# Patient Record
Sex: FEMALE | Race: WHITE | Employment: OTHER | URBAN - METROPOLITAN AREA
[De-identification: names, ages, dates, MRNs, and addresses within clinical notes are randomized per-mention and may not be internally consistent; named-entity substitution may affect disease eponyms.]

---

## 2019-04-17 ENCOUNTER — HOSPITAL ENCOUNTER (OUTPATIENT)
Age: 69
Discharge: HOME | End: 2019-04-19

## 2019-04-17 PROCEDURE — 88305 TISSUE EXAM BY PATHOLOGIST: CPT

## 2020-03-27 ENCOUNTER — HOSPITAL ENCOUNTER (EMERGENCY)
Age: 70
Discharge: HOME | End: 2020-03-27
Payer: MEDICARE

## 2020-03-27 VITALS
BODY MASS INDEX: 39.27 KG/M2 | SYSTOLIC BLOOD PRESSURE: 168 MMHG | OXYGEN SATURATION: 98 % | RESPIRATION RATE: 20 BRPM | DIASTOLIC BLOOD PRESSURE: 92 MMHG | HEART RATE: 80 BPM | WEIGHT: 200 LBS | HEIGHT: 60 IN | TEMPERATURE: 98.4 F

## 2020-03-27 PROCEDURE — 99283 EMERGENCY DEPT VISIT LOW MDM: CPT

## 2020-03-27 NOTE — ED PROVIDER NOTES
Normal.   ED Triage Vitals   BP Temp Temp Source Pulse Resp SpO2 Height Weight   03/27/20 1107 03/27/20 1042 03/27/20 1042 03/27/20 1042 03/27/20 1107 03/27/20 1042 -- --   (!) 168/92 98.4 °F (36.9 °C) Oral 80 20 98 %         Physical Exam  · Constitutional/General: Alert and oriented x3, well appearing, non toxic  · HEENT:  NC/NT. PERRLA,  Airway patent. · Neck: Supple, full ROM, non tender to palpation in the midline, no stridor, no crepitus, no meningeal signs  · Respiratory: Lungs clear to auscultation bilaterally, no wheezes, rales, or rhonchi. Not in respiratory distress  · CV:  Regular rate. Regular rhythm. No murmurs, gallops, or rubs. 2+ distal pulses  · Chest: No chest wall tenderness  · GI:  Abdomen Soft, Non tender, Non distended. +BS. No rebound, guarding, or rigidity. No pulsatile masses. · Musculoskeletal: Moves all extremities x 4. Warm and well perfused, no clubbing, cyanosis, or edema. Capillary refill <3 seconds  · Integument: skin warm and dry. No rashes. · Lymphatic: no lymphadenopathy noted  · Neurologic: GCS 15, no focal deficits, symmetric strength 5/5 in the upper and lower extremities bilaterally  · Psychiatric: Normal Affect    Lab / Imaging Results   (All laboratory and radiology results have been personally reviewed by myself)  Labs:  No results found for this visit on 03/27/20. Imaging: All Radiology results interpreted by Radiologist unless otherwise noted. No orders to display       ED Course / Medical Decision Making   Medications - No data to display         Consult(s):   None    Procedure(s):   none    MDM:   Patient has mildly elevated blood pressure but denies chest pain, shortness of breath, headaches or visual changes. No current complaints at this time. Patient has no abdominal pain, abdomen is soft and nontender to palpation, no rigidity or surgical abdomen. Vital signs are stable otherwise, will discharge home at this time.   Advised return to ER for any

## 2021-03-26 ENCOUNTER — HOSPITAL ENCOUNTER (OUTPATIENT)
Dept: WOUND CARE | Age: 71
Discharge: HOME | End: 2021-03-26
Payer: MEDICARE

## 2021-03-26 VITALS
RESPIRATION RATE: 20 BRPM | TEMPERATURE: 97.8 F | HEIGHT: 64 IN | HEART RATE: 80 BPM | WEIGHT: 240 LBS | BODY MASS INDEX: 40.97 KG/M2

## 2021-03-26 DIAGNOSIS — L97.922 NON-PRESSURE CHRONIC ULCER OF LOWER LEG, LEFT, WITH FAT LAYER EXPOSED (HCC): ICD-10-CM

## 2021-03-26 DIAGNOSIS — I82.4Z3 ACUTE DEEP VEIN THROMBOSIS (DVT) OF DISTAL END OF BOTH LOWER EXTREMITIES (HCC): Primary | ICD-10-CM

## 2021-03-26 DIAGNOSIS — L97.922 NON-PRESSURE CHRONIC ULCER OF LEFT LOWER LEG WITH FAT LAYER EXPOSED (HCC): ICD-10-CM

## 2021-03-26 DIAGNOSIS — I73.9 PVD (PERIPHERAL VASCULAR DISEASE) (HCC): ICD-10-CM

## 2021-03-26 PROBLEM — L97.812 NON-PRESSURE CHRONIC ULCER OF OTHER PART OF RIGHT LOWER LEG WITH FAT LAYER EXPOSED (HCC): Status: ACTIVE | Noted: 2021-03-26

## 2021-03-26 PROCEDURE — 11042 DBRDMT SUBQ TIS 1ST 20SQCM/<: CPT

## 2021-03-26 PROCEDURE — 99214 OFFICE O/P EST MOD 30 MIN: CPT

## 2021-03-26 PROCEDURE — 11045 DBRDMT SUBQ TISS EACH ADDL: CPT

## 2021-03-26 PROCEDURE — 87075 CULTR BACTERIA EXCEPT BLOOD: CPT

## 2021-03-26 PROCEDURE — 87186 SC STD MICRODIL/AGAR DIL: CPT

## 2021-03-26 PROCEDURE — 87077 CULTURE AEROBIC IDENTIFY: CPT

## 2021-03-26 PROCEDURE — 87070 CULTURE OTHR SPECIMN AEROBIC: CPT

## 2021-03-26 RX ORDER — ACETAMINOPHEN 500 MG
500 TABLET ORAL EVERY 6 HOURS PRN
COMMUNITY

## 2021-03-26 RX ORDER — LISINOPRIL AND HYDROCHLOROTHIAZIDE 25; 20 MG/1; MG/1
1 TABLET ORAL DAILY
COMMUNITY

## 2021-03-26 RX ORDER — FUROSEMIDE 40 MG/1
40 TABLET ORAL DAILY
COMMUNITY

## 2021-03-26 RX ORDER — POTASSIUM CHLORIDE 1.5 G/1.77G
20 POWDER, FOR SOLUTION ORAL DAILY
COMMUNITY

## 2021-03-26 RX ORDER — LIDOCAINE HYDROCHLORIDE 40 MG/ML
SOLUTION TOPICAL ONCE
Status: DISCONTINUED | OUTPATIENT
Start: 2021-03-26 | End: 2021-03-27 | Stop reason: HOSPADM

## 2021-03-26 ASSESSMENT — PAIN DESCRIPTION - ORIENTATION: ORIENTATION: RIGHT

## 2021-03-26 ASSESSMENT — PAIN DESCRIPTION - ONSET: ONSET: ON-GOING

## 2021-03-26 ASSESSMENT — PAIN DESCRIPTION - DESCRIPTORS: DESCRIPTORS: THROBBING

## 2021-03-26 ASSESSMENT — PAIN DESCRIPTION - LOCATION: LOCATION: LEG

## 2021-03-26 ASSESSMENT — PAIN - FUNCTIONAL ASSESSMENT: PAIN_FUNCTIONAL_ASSESSMENT: PREVENTS OR INTERFERES SOME ACTIVE ACTIVITIES AND ADLS

## 2021-03-26 NOTE — PROGRESS NOTES
Multilayer Compression Wrap   (Not Unna) Below the Knee    NAME:  Lisa Faith  YOB: 1950  MEDICAL RECORD NUMBER:  [id number]  DATE:  3/26/2021    Multilayer compression wrap: Removed old Multilayer wrap if indicated and wash leg with mild soap/water. Applied moisturizing agent to dry skin as needed. Applied primary and secondary dressing as ordered. Applied multilayered dressing below the knee to right lower leg. Applied multilayered dressing below the knee to left lower leg. Instructed patient/caregiver not to remove dressing and to keep it clean and dry. Instructed patient/caregiver on complications to report to provider, such as pain, numbness in toes, heavy drainage, and slippage of dressing. Instructed patient on purpose of compression dressing and on activity and exercise recommendations.       Electronically signed by Sergio Seay RN on 3/26/2021 at 10:24 AM

## 2021-03-26 NOTE — PROGRESS NOTES
[facility]  History and Physical/Consultation  Podiatry    Referring Physician : Renetta Leach DO  Lias Faith  MEDICAL RECORD NUMBER:  [id number]  AGE: 66 y.o. GENDER: female  : 1950  EPISODE DATE:  3/26/2021  Subjective:     Chief Complaint   Patient presents with    Wound Check     right and left leg         HISTORY of PRESENT ILLNESS HPI     Lisa Faith is a 66 y.o. female who presents today for wound/ulcer evaluation. History of Wound Context:  The patient has had a wound of b/l legs venous which was first noted approximately months ago. This has been treated unna boots. On their initial visit to the wound healing center, 3/26/20 ,  the patient has noted that the wound has not been improving. The patient has had similar previous wounds in the past.      Pt is not on abx at time of initial visit.       Wound/Ulcer Pain Timing/Severity: constant  Quality of pain: aching  Severity:  4 / 10   Modifying Factors: Pain worsens with walking  Associated Signs/Symptoms: edema, erythema, drainage and numbness    Ulcer Identification:  Ulcer Type: venous  Contributing Factors: edema, venous stasis and lymphedema    Diabetic/Pressure/Non Pressure Ulcers onl y:  Ulcer: Non-Pressure ulcer, fat layer exposed    If patient has diabetic lower extremity wounds  Nuenz Classification of diabetic lower extremity wounds:    Grade Description   []  0 No open wound   []  1 Superficial ulcer involving the full skin thickness   []  2 Deep ulcer involves ligament, tendon, joint capsule, or fascia  No bone involvement or abscess presence   []  3 Deep Ulcer with abcess formation and/or osteomyelitis   []  4 Localized gangrene   []  5 Extensive gangrene of the foot     Wound: N/A        PAST MEDICAL HISTORY      Diagnosis Date    Arthritis     Bleeding ulcer     Hypertension      Past Surgical History:   Procedure Laterality Date     SECTION      CHOLECYSTECTOMY, LAPAROSCOPIC  2016    ERCP  07/07/2016    HYSTERECTOMY      JOINT REPLACEMENT Left     left knee     History reviewed. No pertinent family history. Social History     Tobacco Use    Smoking status: Never Smoker    Smokeless tobacco: Never Used   Substance Use Topics    Alcohol use: No    Drug use: No     No Known Allergies  Current Outpatient Medications on File Prior to Encounter   Medication Sig Dispense Refill    furosemide (LASIX) 40 MG tablet Take 40 mg by mouth daily      potassium chloride (KLOR-CON) 20 MEQ packet Take 20 mEq by mouth daily      lisinopril-hydroCHLOROthiazide (PRINZIDE;ZESTORETIC) 20-25 MG per tablet Take 1 tablet by mouth daily      acetaminophen (TYLENOL) 500 MG tablet Take 500 mg by mouth every 6 hours as needed for Pain      amLODIPine (NORVASC) 10 MG tablet Take 1 tablet by mouth daily (Patient taking differently: Take 5 mg by mouth daily ) 30 tablet 3    HYDROcodone-acetaminophen (NORCO) 5-325 MG per tablet Take 1 tablet by mouth every 4 hours as needed (PAIN) 30 tablet 0    HYDROcodone-acetaminophen (NORCO) 5-325 MG per tablet Take 1 tablet by mouth every 6 hours as needed for Pain 34 tablet 0    metoprolol tartrate (LOPRESSOR) 50 MG tablet Take 1 tablet by mouth 2 times daily 60 tablet 3    pantoprazole (PROTONIX) 40 MG tablet Take 1 tablet by mouth every morning (before breakfast) 30 tablet 0    vitamin B-12 (CYANOCOBALAMIN) 100 MCG tablet Take 50 mcg by mouth daily      calcium carbonate (OSCAL) 500 MG TABS tablet Take 1,000 mg by mouth daily       No current facility-administered medications on file prior to encounter.         REVIEW OF SYSTEMS   ROS : All others Negative if blank [], Positive if [x]  General Vascular   [] Fevers [] Claudication   [] Chills [] Rest Pain   Skin Neurologic   [x] Tissue Loss [x] Lower extremity neuropathy     Objective:    Pulse 80   Temp 97.8 °F (36.6 °C) (Temporal)   Resp 20   Ht 5' 4\" (1.626 m)   Wt 240 lb (108.9 kg)   BMI 41.20 kg/m²   Wt Readings from Last 3 Encounters:   03/26/21 240 lb (108.9 kg)   03/27/20 200 lb (90.7 kg)   07/09/16 211 lb 8 oz (95.9 kg)       PHYSICAL EXAM   CONSTITUTIONAL:   Awake, alert, cooperative   PSYCHIATRIC :  Oriented to time, place and person      normal insight to disease process  EXTREMITIES:   R LE Open wounds are noted   Skin color is abnormal with ulcers   Edema is  noted   Sensation intact to light touch   Palpation of the foot does cause pain   4/5 strength DF/PF  L LE Open wounds are noted   Skin color is abnormal with ulcers   Edema is  noted   Sensation intact to light touch   Palpation of the foot does cause pain   4/5 strength DF/PF  R dorsalis pedis 1 L dorsalis pedis 1   R posterior tibial 1 L posterior tibial 1     Assessment:     Problem List Items Addressed This Visit     None      Visit Diagnoses     Acute deep vein thrombosis (DVT) of distal end of both lower extremities ([facility] [address]    -  Primary    Relevant Orders    US DUP LOWER EXTREMITY MAPPING BILAT VENOUS    PVD (peripheral vascular disease) ([facility] [address]        Relevant Orders    VL RODOLFO BILATERAL LIMITED 1-2 LEVELS          Pre Debridement Measurements:  Are located in the [place]  Documentation Flow Sheet  Post Debridement Measurements:  Wound/Ulcer Descriptions are Pre Debridement except measurements:     Incision 07/08/16 Abdomen Mid;Right;Upper; Outer (Active)   Number of days: 1721       Wound 03/26/21 Leg Right;Posterior #1 (Active)   Wound Image   03/26/21 0936   Wound Length (cm) 3.5 cm 03/26/21 0936   Wound Width (cm) 2.2 cm 03/26/21 0936   Wound Depth (cm) 0.3 cm 03/26/21 0936   Wound Surface Area (cm^2) 7.7 cm^2 03/26/21 0936   Wound Volume (cm^3) 2.31 cm^3 03/26/21 0936   Post-Procedure Length (cm) 3.5 cm 03/26/21 1020   Post-Procedure Width (cm) 2.2 cm 03/26/21 1020   Post-Procedure Depth (cm) 0.3 cm 03/26/21 1020   Post-Procedure Surface Area (cm^2) 7.7 cm^2 03/26/21 1020   Post-Procedure Volume (cm^3) 2.31 cm^3 03/26/21 1020   Wound Assessment Pink/red;Fibrin 03/26/21 0936   Drainage Amount Moderate 03/26/21 0936   Drainage Description Serosanguinous;Brown 03/26/21 0936   Odor None 03/26/21 0936   Ying-wound Assessment Dry/flaky 03/26/21 0936   Number of days: 0       Wound 03/26/21 Pretibial Left #2 (Active)   Wound Image   03/26/21 0936   Wound Length (cm) 8.5 cm 03/26/21 0936   Wound Width (cm) 12.7 cm 03/26/21 0936   Wound Depth (cm) 0.2 cm 03/26/21 0936   Wound Surface Area (cm^2) 107.95 cm^2 03/26/21 0936   Wound Volume (cm^3) 21.59 cm^3 03/26/21 0936   Post-Procedure Length (cm) 8.5 cm 03/26/21 1020   Post-Procedure Width (cm) 12.7 cm 03/26/21 1020   Post-Procedure Depth (cm) 0.2 cm 03/26/21 1020   Post-Procedure Surface Area (cm^2) 107.95 cm^2 03/26/21 1020   Post-Procedure Volume (cm^3) 21.59 cm^3 03/26/21 1020   Wound Assessment Pink/red;Fibrin 03/26/21 0936   Drainage Amount Large 03/26/21 0936   Drainage Description Serosanguinous; Yellow 03/26/21 0936   Odor None 03/26/21 0936   Ying-wound Assessment Fragile 03/26/21 0936   Number of days: 0       Wound 03/26/21 Pretibial Right #3 (Active)   Wound Image   03/26/21 0954   Wound Length (cm) 3.5 cm 03/26/21 0954   Wound Width (cm) 5.2 cm 03/26/21 0954   Wound Depth (cm) 0.1 cm 03/26/21 0954   Wound Surface Area (cm^2) 18.2 cm^2 03/26/21 0954   Wound Volume (cm^3) 1.82 cm^3 03/26/21 0954   Post-Procedure Length (cm) 3.5 cm 03/26/21 1020   Post-Procedure Width (cm) 5.2 cm 03/26/21 1020   Post-Procedure Depth (cm) 0.1 cm 03/26/21 1020   Post-Procedure Surface Area (cm^2) 18.2 cm^2 03/26/21 1020   Post-Procedure Volume (cm^3) 1.82 cm^3 03/26/21 1020   Wound Assessment Pink/red 03/26/21 0954   Drainage Amount Small 03/26/21 0954   Drainage Description Serosanguinous; Yellow 03/26/21 0954   Odor None 03/26/21 0954   Ying-wound Assessment Fragile 03/26/21 0954   Number of days: 0       Wound 03/26/21 Leg Right;Medial #4 (Active)   Wound Image   03/26/21 0954   Wound Length (cm) 1.2 cm 03/26/21 0954 Wound Width (cm) 2.3 cm 03/26/21 0954   Wound Depth (cm) 0.1 cm 03/26/21 0954   Wound Surface Area (cm^2) 2.76 cm^2 03/26/21 0954   Wound Volume (cm^3) 0.28 cm^3 03/26/21 0954   Post-Procedure Length (cm) 1.2 cm 03/26/21 1020   Post-Procedure Width (cm) 2.3 cm 03/26/21 1020   Post-Procedure Depth (cm) 0.1 cm 03/26/21 1020   Post-Procedure Surface Area (cm^2) 2.76 cm^2 03/26/21 1020   Post-Procedure Volume (cm^3) 0.28 cm^3 03/26/21 1020   Wound Assessment Pink/red 03/26/21 0954   Drainage Amount Small 03/26/21 0954   Drainage Description Serosanguinous; Yellow 03/26/21 0954   Odor None 03/26/21 0954   Ying-wound Assessment Fragile 03/26/21 0954   Number of days: 0          Procedure Note  Indications:  Based on my examination of this patient's wound(s)/ulcer(s) today, debridement is required to promote healing and evaluate the wound base. Performed by: Erik Malik DPM    Consent obtained:  Yes    Time out taken:  Yes    Pain Control: Anesthetic  Anesthetic: 4% Lidocaine Liquid Topical     Debridement:Excisional Debridement    Using #15 blade scalpel the wound(s)/ulcer(s) was/were sharply debrided down through and including the removal of subcutaneous tissue. Devitalized Tissue Debrided:  fibrin, biofilm, slough and necrotic/eschar to stimulate bleeding to promote healing, post debridement good bleeding base and wound edges noted    Wound/Ulcer #: 1 and 2    Percent of Wound/Ulcer Debrided: 100%    Total Surface Area Debrided:  35 sq cm     Estimated Blood Loss:  Minimal  Hemostasis Achieved:  by pressure    Procedural Pain:  3  / 10   Post Procedural Pain:  4 / 10     Response to treatment:  Well tolerated by patient. A culture was done.       Plan:     Pt is not a smoker   - Discussed relationship of smoking and negative affects on wound healing   - Emphasized importance of tobacco avoidace/cessation   - Script for nicotine patch given to patient   - Information regarding support groups for smoking cessation given    In my professional opinion and based off the information that is available at this time this patient has appropriate indication for HBO Therapy: Maybe    Treatment Note please see attached Discharge Instructions    Written patient dismissal instructions given to patient and signed by patient or POA.              Electronically signed by Trina Gilbert DPM on 3/26/2021 at 10:24 AM

## 2021-03-26 NOTE — PLAN OF CARE
Problem: Pain:  Goal: Pain level will decrease  Description: Pain level will decrease  Outcome: Ongoing  Goal: Control of acute pain  Description: Control of acute pain  Outcome: Ongoing  Goal: Control of chronic pain  Description: Control of chronic pain  Outcome: Ongoing     Problem: Wound:  Goal: Will show signs of wound healing; wound closure and no evidence of infection  Description: Will show signs of wound healing; wound closure and no evidence of infection  Outcome: Ongoing     Problem: Arterial:  Goal: Optimize blood flow for wound healing  Description: Optimize blood flow for wound healing  Outcome: Ongoing     Problem: Venous:  Goal: Signs of wound healing will improve  Description: Signs of wound healing will improve  Outcome: Ongoing

## 2021-03-29 LAB — ANAEROBIC CULTURE: NORMAL

## 2021-03-31 ENCOUNTER — HOSPITAL ENCOUNTER (OUTPATIENT)
Dept: ULTRASOUND IMAGING | Age: 71
Discharge: HOME | End: 2021-04-02
Payer: MEDICARE

## 2021-03-31 ENCOUNTER — HOSPITAL ENCOUNTER (OUTPATIENT)
Dept: INTERVENTIONAL RADIOLOGY/VASCULAR | Age: 71
Discharge: HOME | End: 2021-04-02
Payer: MEDICARE

## 2021-03-31 DIAGNOSIS — I73.9 PVD (PERIPHERAL VASCULAR DISEASE) (HCC): ICD-10-CM

## 2021-03-31 DIAGNOSIS — I82.4Z3 ACUTE DEEP VEIN THROMBOSIS (DVT) OF DISTAL END OF BOTH LOWER EXTREMITIES (HCC): ICD-10-CM

## 2021-03-31 LAB
ORGANISM: ABNORMAL
WOUND/ABSCESS: ABNORMAL

## 2021-03-31 PROCEDURE — 93922 UPR/L XTREMITY ART 2 LEVELS: CPT

## 2021-03-31 PROCEDURE — 93970 EXTREMITY STUDY: CPT | Performed by: RADIOLOGY

## 2021-03-31 PROCEDURE — 93970 EXTREMITY STUDY: CPT

## 2021-04-09 ENCOUNTER — HOSPITAL ENCOUNTER (OUTPATIENT)
Dept: WOUND CARE | Age: 71
Discharge: HOME | End: 2021-04-09
Payer: MEDICARE

## 2021-04-09 VITALS
RESPIRATION RATE: 21 BRPM | HEART RATE: 75 BPM | TEMPERATURE: 97.9 F | DIASTOLIC BLOOD PRESSURE: 74 MMHG | SYSTOLIC BLOOD PRESSURE: 140 MMHG

## 2021-04-09 DIAGNOSIS — I83.899 VENOUS STASIS ULCER WITH EDEMA OF LOWER LEG (HCC): ICD-10-CM

## 2021-04-09 DIAGNOSIS — I83.009 VENOUS STASIS ULCER WITH EDEMA OF LOWER LEG (HCC): ICD-10-CM

## 2021-04-09 DIAGNOSIS — R60.9 VENOUS STASIS ULCER WITH EDEMA OF LOWER LEG (HCC): ICD-10-CM

## 2021-04-09 DIAGNOSIS — L97.812 NON-PRESSURE CHRONIC ULCER OF OTHER PART OF RIGHT LOWER LEG WITH FAT LAYER EXPOSED (HCC): Primary | ICD-10-CM

## 2021-04-09 DIAGNOSIS — L97.922 NON-PRESSURE CHRONIC ULCER OF LOWER LEG, LEFT, WITH FAT LAYER EXPOSED (HCC): ICD-10-CM

## 2021-04-09 DIAGNOSIS — L97.909 VENOUS STASIS ULCER WITH EDEMA OF LOWER LEG (HCC): ICD-10-CM

## 2021-04-09 PROCEDURE — 11042 DBRDMT SUBQ TIS 1ST 20SQCM/<: CPT

## 2021-04-09 PROCEDURE — 11042 DBRDMT SUBQ TIS 1ST 20SQCM/<: CPT | Performed by: PODIATRIST

## 2021-04-09 PROCEDURE — 99203 OFFICE O/P NEW LOW 30 MIN: CPT | Performed by: PODIATRIST

## 2021-04-09 PROCEDURE — 11045 DBRDMT SUBQ TISS EACH ADDL: CPT | Performed by: PODIATRIST

## 2021-04-09 PROCEDURE — 11045 DBRDMT SUBQ TISS EACH ADDL: CPT

## 2021-04-09 RX ORDER — LEVOFLOXACIN 500 MG/1
500 TABLET, FILM COATED ORAL DAILY
COMMUNITY
End: 2021-04-16

## 2021-04-09 RX ORDER — LEVOFLOXACIN 500 MG/1
500 TABLET, FILM COATED ORAL DAILY
Qty: 10 TABLET | Refills: 0 | Status: SHIPPED | OUTPATIENT
Start: 2021-04-09 | End: 2021-04-19

## 2021-04-09 ASSESSMENT — PAIN - FUNCTIONAL ASSESSMENT: PAIN_FUNCTIONAL_ASSESSMENT: PREVENTS OR INTERFERES SOME ACTIVE ACTIVITIES AND ADLS

## 2021-04-09 ASSESSMENT — PAIN SCALES - GENERAL: PAINLEVEL_OUTOF10: 9

## 2021-04-09 ASSESSMENT — PAIN DESCRIPTION - ONSET: ONSET: ON-GOING

## 2021-04-09 ASSESSMENT — PAIN DESCRIPTION - FREQUENCY: FREQUENCY: INTERMITTENT

## 2021-04-09 ASSESSMENT — PAIN DESCRIPTION - LOCATION: LOCATION: LEG

## 2021-04-09 NOTE — PROGRESS NOTES
([facility]    Relevant Medications    levoFLOXacin (LEVAQUIN) 500 MG tablet          Pre Debridement Measurements:  Are located in the [place]  Documentation Flow Sheet  Post Debridement Measurements:  Wound/Ulcer Descriptions are Pre Debridement except measurements:     Incision 07/08/16 Abdomen Mid;Right;Upper; Outer (Active)   Number of days: 1735       Wound 03/26/21 Leg Right;Posterior #1 (Active)   Wound Image   03/26/21 0936   Dressing Status New dressing applied 03/26/21 1509   Wound Cleansed Cleansed with saline 03/26/21 1509   Dressing/Treatment Alginate with Ag;ABD;Dry dressing;Coban/self-adherent bandages 03/26/21 1509   Offloading for Diabetic Foot Ulcers No offloading required 03/26/21 1509   Wound Length (cm) 4.7 cm 04/09/21 1354   Wound Width (cm) 6.8 cm 04/09/21 1354   Wound Depth (cm) 0.2 cm 04/09/21 1354   Wound Surface Area (cm^2) 31.96 cm^2 04/09/21 1354   Change in Wound Size % (l*w) -315.06 04/09/21 1354   Wound Volume (cm^3) 6.39 cm^3 04/09/21 1354   Wound Healing % -177 04/09/21 1354   Post-Procedure Length (cm) 4.7 cm 04/09/21 1443   Post-Procedure Width (cm) 6.8 cm 04/09/21 1443   Post-Procedure Depth (cm) 0.3 cm 04/09/21 1443   Post-Procedure Surface Area (cm^2) 31.96 cm^2 04/09/21 1443   Post-Procedure Volume (cm^3) 9.59 cm^3 04/09/21 1443   Wound Assessment Pink/red;Fibrin;Pale granulation tissue 04/09/21 1354   Drainage Amount Moderate 04/09/21 1354   Drainage Description Serosanguinous 04/09/21 1354   Odor None 03/26/21 0936   Ying-wound Assessment Dry/flaky 04/09/21 1354   Number of days: 14       Wound 03/26/21 Pretibial Left #2 (Active)   Wound Image   03/26/21 0936   Wound Length (cm) 9.2 cm 04/09/21 1354   Wound Width (cm) 9.7 cm 04/09/21 1354   Wound Depth (cm) 0.1 cm 04/09/21 1354   Wound Surface Area (cm^2) 89.24 cm^2 04/09/21 1354   Change in Wound Size % (l*w) 17.33 04/09/21 1354   Wound Volume (cm^3) 8.92 cm^3 04/09/21 1354   Wound Healing % 59 04/09/21 1354   Post-Procedure Length (cm) 9.2 cm 04/09/21 1443   Post-Procedure Width (cm) 9.7 cm 04/09/21 1443   Post-Procedure Depth (cm) 0.2 cm 04/09/21 1443   Post-Procedure Surface Area (cm^2) 89.24 cm^2 04/09/21 1443   Post-Procedure Volume (cm^3) 17.85 cm^3 04/09/21 1443   Wound Assessment Pink/red;Fibrin;Pale granulation tissue 04/09/21 1354   Drainage Amount Large 04/09/21 1354   Drainage Description Serosanguinous; Yellow 04/09/21 1354   Odor None 04/09/21 1354   Ying-wound Assessment Fragile 04/09/21 1354   Number of days: 14       Wound 03/26/21 Pretibial Right #3 (Active)   Wound Image   03/26/21 0954   Wound Length (cm) 0.7 cm 04/09/21 1354   Wound Width (cm) 0.7 cm 04/09/21 1354   Wound Depth (cm) 0.1 cm 04/09/21 1354   Wound Surface Area (cm^2) 0.49 cm^2 04/09/21 1354   Change in Wound Size % (l*w) 97.31 04/09/21 1354   Wound Volume (cm^3) 0.05 cm^3 04/09/21 1354   Wound Healing % 97 04/09/21 1354   Post-Procedure Length (cm) 0.7 cm 04/09/21 1443   Post-Procedure Width (cm) 0.7 cm 04/09/21 1443   Post-Procedure Depth (cm) 0.1 cm 04/09/21 1443   Post-Procedure Surface Area (cm^2) 0.49 cm^2 04/09/21 1443   Post-Procedure Volume (cm^3) 0.05 cm^3 04/09/21 1443   Wound Assessment Pink/red;Pale granulation tissue 04/09/21 1354   Drainage Amount Small 04/09/21 1354   Drainage Description Serous 04/09/21 1354   Odor None 04/09/21 1354   Ying-wound Assessment Fragile 04/09/21 1354   Number of days: 14       Wound 03/26/21 Leg Right;Medial #4 (Active)   Wound Image   03/26/21 0954   Wound Length (cm) 1.4 cm 04/09/21 1354   Wound Width (cm) 2.5 cm 04/09/21 1354   Wound Depth (cm) 0.1 cm 04/09/21 1354   Wound Surface Area (cm^2) 3.5 cm^2 04/09/21 1354   Change in Wound Size % (l*w) -26.81 04/09/21 1354   Wound Volume (cm^3) 0.35 cm^3 04/09/21 1354   Wound Healing % -25 04/09/21 1354   Post-Procedure Length (cm) 1.2 cm 04/09/21 1443   Post-Procedure Width (cm) 2.3 cm 04/09/21 1443   Post-Procedure Depth (cm) 0.1 cm 04/09/21 1443 Post-Procedure Surface Area (cm^2) 2.76 cm^2 04/09/21 1443   Post-Procedure Volume (cm^3) 0.28 cm^3 04/09/21 1443   Wound Assessment Pink/red;Fibrin;Pale granulation tissue 04/09/21 1354   Drainage Amount Moderate 04/09/21 1354   Drainage Description Serous; Thin;Yellow 04/09/21 1354   Odor None 04/09/21 1354   Ying-wound Assessment Fragile 04/09/21 1354   Number of days: 14          Procedure Note  Indications:  Based on my examination of this patient's wound(s)/ulcer(s) today, debridement is required to promote healing and evaluate the wound base. Performed by: Ollie Butler DPM    Consent obtained:  Yes    Time out taken:  Yes    Pain Control: Anesthetic  Anesthetic: 4% Lidocaine Liquid Topical     Debridement:Excisional Debridement    Using curette the wound(s)/ulcer(s) was/were sharply debrided down through and including the removal of subcutaneous tissue. Devitalized Tissue Debrided:  fibrin, biofilm and slough to stimulate bleeding to promote healing, post debridement good bleeding base and wound edges noted    Wound/Ulcer #: 1 and 2    Percent of Wound/Ulcer Debrided: 100%    Total Surface Area Debrided:  121.20 sq cm     Estimated Blood Loss:  Minimal  Hemostasis Achieved:  by pressure    Procedural Pain:  3  / 10   Post Procedural Pain:  0 / 10     Response to treatment:  Well tolerated by patient. Prescription medication Levaquin given with exact instructions on usage. I discussed the potential side effects that the patient may experience with the medication and the need to call if they experience any. Plan:   Treatment Note please see attached Discharge Instructions    Written patient dismissal instructions given to patient and signed by patient or POA.          Discharge Instructions       Visit Discharge/Physician Orders     Discharge condition: Stable     Assessment of pain at discharge:  none     Anesthetic used: 4% lidocaine solutiion     Discharge to: Home     Left

## 2021-04-16 ENCOUNTER — HOSPITAL ENCOUNTER (OUTPATIENT)
Dept: WOUND CARE | Age: 71
Discharge: HOME | End: 2021-04-16
Payer: MEDICARE

## 2021-04-16 VITALS
TEMPERATURE: 96.8 F | DIASTOLIC BLOOD PRESSURE: 68 MMHG | SYSTOLIC BLOOD PRESSURE: 124 MMHG | RESPIRATION RATE: 20 BRPM | HEART RATE: 74 BPM

## 2021-04-16 DIAGNOSIS — L97.812 NON-PRESSURE CHRONIC ULCER OF OTHER PART OF RIGHT LOWER LEG WITH FAT LAYER EXPOSED (HCC): ICD-10-CM

## 2021-04-16 DIAGNOSIS — R60.9 VENOUS STASIS ULCER WITH EDEMA OF LOWER LEG (HCC): ICD-10-CM

## 2021-04-16 DIAGNOSIS — I83.899 VENOUS STASIS ULCER WITH EDEMA OF LOWER LEG (HCC): ICD-10-CM

## 2021-04-16 DIAGNOSIS — I83.009 VENOUS STASIS ULCER WITH EDEMA OF LOWER LEG (HCC): ICD-10-CM

## 2021-04-16 DIAGNOSIS — L97.909 VENOUS STASIS ULCER WITH EDEMA OF LOWER LEG (HCC): ICD-10-CM

## 2021-04-16 DIAGNOSIS — L97.922 NON-PRESSURE CHRONIC ULCER OF LOWER LEG, LEFT, WITH FAT LAYER EXPOSED (HCC): ICD-10-CM

## 2021-04-16 PROCEDURE — 11042 DBRDMT SUBQ TIS 1ST 20SQCM/<: CPT

## 2021-04-16 ASSESSMENT — PAIN DESCRIPTION - PAIN TYPE: TYPE: CHRONIC PAIN

## 2021-04-16 ASSESSMENT — PAIN - FUNCTIONAL ASSESSMENT: PAIN_FUNCTIONAL_ASSESSMENT: ACTIVITIES ARE NOT PREVENTED

## 2021-04-16 ASSESSMENT — PAIN DESCRIPTION - DESCRIPTORS: DESCRIPTORS: BURNING

## 2021-04-16 ASSESSMENT — PAIN DESCRIPTION - ORIENTATION: ORIENTATION: LEFT

## 2021-04-16 ASSESSMENT — PAIN SCALES - GENERAL: PAINLEVEL_OUTOF10: 5

## 2021-04-16 ASSESSMENT — PAIN DESCRIPTION - LOCATION: LOCATION: LEG

## 2021-04-16 ASSESSMENT — PAIN DESCRIPTION - FREQUENCY: FREQUENCY: INTERMITTENT

## 2021-04-16 NOTE — PLAN OF CARE
Problem: Pain:  Goal: Pain level will decrease  Description: Pain level will decrease  Outcome: Ongoing  Goal: Control of acute pain  Description: Control of acute pain  Outcome: Ongoing  Goal: Control of chronic pain  Description: Control of chronic pain  Outcome: Ongoing     Problem: Wound:  Goal: Will show signs of wound healing; wound closure and no evidence of infection  Description: Will show signs of wound healing; wound closure and no evidence of infection  Outcome: Ongoing     Problem: Arterial:  Goal: Optimize blood flow for wound healing  Description: Optimize blood flow for wound healing  Outcome: Ongoing     Problem: Venous:  Goal: Signs of wound healing will improve  Description: Signs of wound healing will improve  Outcome: Ongoing     Problem: Compression therapy:  Goal: Will be free from complications associated with compression therapy  Description: Will be free from complications associated with compression therapy  Outcome: Ongoing     Problem: Falls - Risk of:  Goal: Will remain free from falls  Description: Will remain free from falls  Outcome: Ongoing

## 2021-04-16 NOTE — PROGRESS NOTES
Wound Healing Center  History and Physical/Consultation  Podiatry    Referring Physician : Paul Hendricks DO  Chiquis Catalan  MEDICAL RECORD NUMBER:  [id number]  AGE: 66 y.o. GENDER: female  : 1950  EPISODE DATE:  2021  Subjective:     Chief Complaint   Patient presents with    Wound Check     wounds bilateral legs         HISTORY of PRESENT ILLNESS HANH Catalan is a 66 y.o. female who presents today for wound/ulcer evaluation. History of Wound Context:  The patient has had a wound of b/l legs venous which was first noted approximately months ago. This has been treated unna boots. On their initial visit to the wound healing center, 3/26/20 ,  the patient has noted that the wound has not been improving. The patient has had similar previous wounds in the past.      Pt is not on abx at time of initial visit.       Wound/Ulcer Pain Timing/Severity: constant  Quality of pain: aching  Severity:  4 / 10   Modifying Factors: Pain worsens with walking  Associated Signs/Symptoms: edema, erythema, drainage and numbness    Ulcer Identification:  Ulcer Type: venous  Contributing Factors: edema, venous stasis and lymphedema    Diabetic/Pressure/Non Pressure Ulcers onl y:  Ulcer: Non-Pressure ulcer, fat layer exposed    -  Debrided, cont tx and care, profore lyte with bhavana  If patient has diabetic lower extremity wounds  Nunez Classification of diabetic lower extremity wounds:    Grade Description   []  0 No open wound   []  1 Superficial ulcer involving the full skin thickness   []  2 Deep ulcer involves ligament, tendon, joint capsule, or fascia  No bone involvement or abscess presence   []  3 Deep Ulcer with abcess formation and/or osteomyelitis   []  4 Localized gangrene   []  5 Extensive gangrene of the foot     Wound: N/A        PAST MEDICAL HISTORY      Diagnosis Date    Arthritis     Bleeding ulcer     Hypertension      Past Surgical History:   Procedure Laterality Date     SECTION      CHOLECYSTECTOMY, LAPAROSCOPIC  2016    ERCP  2016    HYSTERECTOMY      JOINT REPLACEMENT Left     left knee     History reviewed. No pertinent family history. Social History     Tobacco Use    Smoking status: Never Smoker    Smokeless tobacco: Never Used   Substance Use Topics    Alcohol use: No    Drug use: No     No Known Allergies  Current Outpatient Medications on File Prior to Encounter   Medication Sig Dispense Refill    furosemide (LASIX) 40 MG tablet Take 40 mg by mouth daily      potassium chloride (KLOR-CON) 20 MEQ packet Take 20 mEq by mouth daily      lisinopril-hydroCHLOROthiazide (PRINZIDE;ZESTORETIC) 20-25 MG per tablet Take 1 tablet by mouth daily      metoprolol tartrate (LOPRESSOR) 50 MG tablet Take 1 tablet by mouth 2 times daily 60 tablet 3    amLODIPine (NORVASC) 10 MG tablet Take 1 tablet by mouth daily (Patient taking differently: Take 5 mg by mouth daily ) 30 tablet 3    pantoprazole (PROTONIX) 40 MG tablet Take 1 tablet by mouth every morning (before breakfast) 30 tablet 0    vitamin B-12 (CYANOCOBALAMIN) 100 MCG tablet Take 50 mcg by mouth daily      calcium carbonate (OSCAL) 500 MG TABS tablet Take 1,000 mg by mouth daily      levoFLOXacin (LEVAQUIN) 500 MG tablet Take 1 tablet by mouth daily for 10 days 10 tablet 0    acetaminophen (TYLENOL) 500 MG tablet Take 500 mg by mouth every 6 hours as needed for Pain       No current facility-administered medications on file prior to encounter.         REVIEW OF SYSTEMS   ROS : All others Negative if blank [], Positive if [x]  General Vascular   [] Fevers [] Claudication   [] Chills [] Rest Pain   Skin Neurologic   [x] Tissue Loss [x] Lower extremity neuropathy     Objective:    /68   Pulse 74   Temp 96.8 °F (36 °C) (Temporal)   Resp 20   Wt Readings from Last 3 Encounters:   21 240 lb (108.9 kg)   20 200 lb (90.7 kg)   16 211 lb 8 oz (95.9 kg)       PHYSICAL EXAM cm 04/16/21 1024   Post-Procedure Surface Area (cm^2) 15.6 cm^2 04/16/21 1024   Post-Procedure Volume (cm^3) 4.68 cm^3 04/16/21 1024   Wound Assessment Pink/red;Fibrin;Pale granulation tissue 04/09/21 1354   Drainage Amount Small 04/16/21 1013   Drainage Description Yellow;Serosanguinous 04/16/21 1013   Odor None 04/16/21 1013   Ying-wound Assessment Fragile 04/16/21 1013   Number of days: 21       Wound 03/26/21 Pretibial Left #2 (Active)   Wound Image   03/26/21 0936   Wound Length (cm) 9 cm 04/16/21 1013   Wound Width (cm) 9 cm 04/16/21 1013   Wound Depth (cm) 0.1 cm 04/16/21 1013   Wound Surface Area (cm^2) 81 cm^2 04/16/21 1013   Change in Wound Size % (l*w) 24.97 04/16/21 1013   Wound Volume (cm^3) 8.1 cm^3 04/16/21 1013   Wound Healing % 62 04/16/21 1013   Post-Procedure Length (cm) 9 cm 04/16/21 1024   Post-Procedure Width (cm) 9 cm 04/16/21 1024   Post-Procedure Depth (cm) 0.2 cm 04/16/21 1024   Post-Procedure Surface Area (cm^2) 81 cm^2 04/16/21 1024   Post-Procedure Volume (cm^3) 16.2 cm^3 04/16/21 1024   Wound Assessment Pink/red;Fibrin;Pale granulation tissue 04/09/21 1354   Drainage Amount Moderate 04/16/21 1013   Drainage Description Yellow 04/16/21 1013   Odor None 04/16/21 1013   Ying-wound Assessment Fragile 04/16/21 1013   Number of days: 21       Wound 03/26/21 Pretibial Right #3 (Active)   Wound Image   03/26/21 0954   Wound Length (cm) 0 cm 04/16/21 1013   Wound Width (cm) 0 cm 04/16/21 1013   Wound Depth (cm) 0 cm 04/16/21 1013   Wound Surface Area (cm^2) 0 cm^2 04/16/21 1013   Change in Wound Size % (l*w) 100 04/16/21 1013   Wound Volume (cm^3) 0 cm^3 04/16/21 1013   Wound Healing % 100 04/16/21 1013   Post-Procedure Length (cm) 0.7 cm 04/09/21 1443   Post-Procedure Width (cm) 0.7 cm 04/09/21 1443   Post-Procedure Depth (cm) 0.1 cm 04/09/21 1443   Post-Procedure Surface Area (cm^2) 0.49 cm^2 04/09/21 1443   Post-Procedure Volume (cm^3) 0.05 cm^3 04/09/21 1443   Wound Assessment Epithelialization 04/16/21 1013   Drainage Amount None 04/16/21 1013   Drainage Description Serous 04/09/21 1354   Odor None 04/16/21 1013   Ying-wound Assessment Intact 04/16/21 1013   Number of days: 21       Wound 03/26/21 Leg Right;Medial #4 (Active)   Wound Image   04/16/21 1013   Wound Length (cm) 1.4 cm 04/09/21 1354   Wound Width (cm) 2.5 cm 04/09/21 1354   Wound Depth (cm) 0.1 cm 04/09/21 1354   Wound Surface Area (cm^2) 3.5 cm^2 04/09/21 1354   Change in Wound Size % (l*w) -26.81 04/09/21 1354   Wound Volume (cm^3) 0.35 cm^3 04/09/21 1354   Wound Healing % -25 04/09/21 1354   Post-Procedure Length (cm) 0 cm 04/16/21 1013   Post-Procedure Width (cm) 0 cm 04/16/21 1013   Post-Procedure Depth (cm) 0 cm 04/16/21 1013   Post-Procedure Surface Area (cm^2) 0 cm^2 04/16/21 1013   Post-Procedure Volume (cm^3) 0 cm^3 04/16/21 1013   Wound Assessment Epithelialization 04/16/21 1013   Drainage Amount None 04/16/21 1013   Drainage Description Serous; Thin;Yellow 04/09/21 1354   Odor None 04/16/21 1013   Ying-wound Assessment Intact 04/16/21 1013   Number of days: 21          Procedure Note  Indications:  Based on my examination of this patient's wound(s)/ulcer(s) today, debridement is required to promote healing and evaluate the wound base. Performed by: Jesus Pierce DPM    Consent obtained:  Yes    Time out taken:  Yes    Pain Control: Anesthetic  Anesthetic: 4% Lidocaine Liquid Topical     Debridement:Excisional Debridement    Using #15 blade scalpel the wound(s)/ulcer(s) was/were sharply debrided down through and including the removal of subcutaneous tissue.         Devitalized Tissue Debrided:  fibrin, biofilm, slough and necrotic/eschar to stimulate bleeding to promote healing, post debridement good bleeding base and wound edges noted    Wound/Ulcer #: 1 and 2    Percent of Wound/Ulcer Debrided: 100%    Total Surface Area Debrided:  26 sq cm     Estimated Blood Loss:  Minimal  Hemostasis Achieved:  by pressure    Procedural Pain:  3  / 10   Post Procedural Pain:  4 / 10     Response to treatment:  Well tolerated by patient. A culture was done. Plan:     Pt is not a smoker   - Discussed relationship of smoking and negative affects on wound healing   - Emphasized importance of tobacco avoidace/cessation   - Script for nicotine patch given to patient   - Information regarding support groups for smoking cessation given    In my professional opinion and based off the information that is available at this time this patient has appropriate indication for HBO Therapy: Maybe    Treatment Note please see attached Discharge Instructions    Written patient dismissal instructions given to patient and signed by patient or POA. Discharge Instructions         Visit Discharge/Physician Orders     Discharge condition: Stable     Assessment of pain at discharge:  none     Anesthetic used: 4% lidocaine solutiion     Discharge to: Home     Left via:Private automobile     Accompanied by: accompanied by grand daughter     ECF/HHA: [facility]      Dressing Orders: Clean bilateral lower leg ulcers with normal saline. Apply promogran dry dressing and profore lite. Change weekly at wound care center     Treatment Orders: complete antibiotic as prescribed. [facility] followup visit __________1 DR LENA Monge__wednesday________________  (Please note your next appointment above and if you are unable to keep, kindly give a 24 hour notice.  Thank you.)     Physician signature:__________________________        If you experience any of the following, please call the [facility] during business hours:     * Increase in Pain  * Temperature over 101  * Increase in drainage from your wound  * Drainage with a foul odor  * Bleeding  * Increase in swelling  * Need for compression bandage changes due to slippage, breakthrough drainage.     If you need medical attention outside of the business hours of the [address] please contact your PCP or go to the nearest emergency room.                                Electronically signed by Brianda Mcclendon DPM on 4/16/2021 at 10:35 AM

## 2021-04-21 ENCOUNTER — HOSPITAL ENCOUNTER (OUTPATIENT)
Dept: WOUND CARE | Age: 71
Discharge: HOME | End: 2021-04-21
Payer: MEDICARE

## 2021-04-21 VITALS
SYSTOLIC BLOOD PRESSURE: 136 MMHG | RESPIRATION RATE: 21 BRPM | HEIGHT: 64 IN | HEART RATE: 91 BPM | DIASTOLIC BLOOD PRESSURE: 78 MMHG | BODY MASS INDEX: 40.97 KG/M2 | TEMPERATURE: 97.4 F | WEIGHT: 240 LBS

## 2021-04-21 DIAGNOSIS — R60.9 VENOUS STASIS ULCER WITH EDEMA OF LOWER LEG (HCC): Primary | ICD-10-CM

## 2021-04-21 DIAGNOSIS — I83.899 VENOUS STASIS ULCER WITH EDEMA OF LOWER LEG (HCC): Primary | ICD-10-CM

## 2021-04-21 DIAGNOSIS — L97.909 VENOUS STASIS ULCER WITH EDEMA OF LOWER LEG (HCC): Primary | ICD-10-CM

## 2021-04-21 DIAGNOSIS — I83.009 VENOUS STASIS ULCER WITH EDEMA OF LOWER LEG (HCC): Primary | ICD-10-CM

## 2021-04-21 DIAGNOSIS — L97.922 NON-PRESSURE CHRONIC ULCER OF LOWER LEG, LEFT, WITH FAT LAYER EXPOSED (HCC): ICD-10-CM

## 2021-04-21 DIAGNOSIS — L97.812 NON-PRESSURE CHRONIC ULCER OF OTHER PART OF RIGHT LOWER LEG WITH FAT LAYER EXPOSED (HCC): ICD-10-CM

## 2021-04-21 PROCEDURE — 11042 DBRDMT SUBQ TIS 1ST 20SQCM/<: CPT

## 2021-04-21 RX ORDER — LIDOCAINE 50 MG/G
OINTMENT TOPICAL ONCE
Status: CANCELLED | OUTPATIENT
Start: 2021-04-21 | End: 2021-04-21

## 2021-04-21 RX ORDER — BACITRACIN ZINC AND POLYMYXIN B SULFATE 500; 1000 [USP'U]/G; [USP'U]/G
OINTMENT TOPICAL ONCE
Status: CANCELLED | OUTPATIENT
Start: 2021-04-21 | End: 2021-04-21

## 2021-04-21 RX ORDER — LIDOCAINE HYDROCHLORIDE 20 MG/ML
JELLY TOPICAL ONCE
Status: CANCELLED | OUTPATIENT
Start: 2021-04-21 | End: 2021-04-21

## 2021-04-21 RX ORDER — GINSENG 100 MG
CAPSULE ORAL ONCE
Status: CANCELLED | OUTPATIENT
Start: 2021-04-21 | End: 2021-04-21

## 2021-04-21 RX ORDER — LIDOCAINE 40 MG/G
CREAM TOPICAL ONCE
Status: CANCELLED | OUTPATIENT
Start: 2021-04-21 | End: 2021-04-21

## 2021-04-21 RX ORDER — BACITRACIN, NEOMYCIN, POLYMYXIN B 400; 3.5; 5 [USP'U]/G; MG/G; [USP'U]/G
OINTMENT TOPICAL ONCE
Status: CANCELLED | OUTPATIENT
Start: 2021-04-21 | End: 2021-04-21

## 2021-04-21 RX ORDER — LIDOCAINE HYDROCHLORIDE 40 MG/ML
SOLUTION TOPICAL ONCE
Status: DISCONTINUED | OUTPATIENT
Start: 2021-04-21 | End: 2021-04-22 | Stop reason: HOSPADM

## 2021-04-21 RX ORDER — BETAMETHASONE DIPROPIONATE 0.05 %
OINTMENT (GRAM) TOPICAL ONCE
Status: CANCELLED | OUTPATIENT
Start: 2021-04-21 | End: 2021-04-21

## 2021-04-21 RX ORDER — LIDOCAINE HYDROCHLORIDE 40 MG/ML
SOLUTION TOPICAL ONCE
Status: CANCELLED | OUTPATIENT
Start: 2021-04-21 | End: 2021-04-21

## 2021-04-21 RX ORDER — CLOBETASOL PROPIONATE 0.5 MG/G
OINTMENT TOPICAL ONCE
Status: CANCELLED | OUTPATIENT
Start: 2021-04-21 | End: 2021-04-21

## 2021-04-21 RX ORDER — GENTAMICIN SULFATE 1 MG/G
OINTMENT TOPICAL ONCE
Status: CANCELLED | OUTPATIENT
Start: 2021-04-21 | End: 2021-04-21

## 2021-04-21 ASSESSMENT — PAIN DESCRIPTION - PAIN TYPE: TYPE: CHRONIC PAIN

## 2021-04-21 ASSESSMENT — PAIN DESCRIPTION - DESCRIPTORS: DESCRIPTORS: BURNING

## 2021-04-21 ASSESSMENT — PAIN DESCRIPTION - ORIENTATION: ORIENTATION: LEFT

## 2021-04-21 ASSESSMENT — PAIN DESCRIPTION - FREQUENCY: FREQUENCY: INTERMITTENT

## 2021-04-21 ASSESSMENT — PAIN SCALES - GENERAL: PAINLEVEL_OUTOF10: 6

## 2021-04-21 ASSESSMENT — PAIN DESCRIPTION - ONSET: ONSET: PROGRESSIVE

## 2021-04-21 NOTE — PLAN OF CARE
Problem: Wound:  Goal: Will show signs of wound healing; wound closure and no evidence of infection  Description: Will show signs of wound healing; wound closure and no evidence of infection  Outcome: Ongoing     Problem: Arterial:  Goal: Optimize blood flow for wound healing  Description: Optimize blood flow for wound healing  Outcome: Ongoing     Problem: Venous:  Goal: Signs of wound healing will improve  Description: Signs of wound healing will improve  Outcome: Ongoing     Problem: Compression therapy:  Goal: Will be free from complications associated with compression therapy  Description: Will be free from complications associated with compression therapy  Outcome: Ongoing     Problem: Falls - Risk of:  Goal: Will remain free from falls  Description: Will remain free from falls  Outcome: Ongoing     Problem: Pain:  Goal: Pain level will decrease  Description: Pain level will decrease  Outcome: Ongoing  Goal: Control of acute pain  Description: Control of acute pain  Outcome: Ongoing  Goal: Control of chronic pain  Description: Control of chronic pain  Outcome: Ongoing

## 2021-04-21 NOTE — PROGRESS NOTES
Wound Healing Center  History and Physical/Consultation  Podiatry    Referring Physician : Jose Wallis DO  Isael Covarrubias  MEDICAL RECORD NUMBER:  [id number]  AGE: 66 y.o. GENDER: female  : 1950  EPISODE DATE:  2021  Subjective:     Chief Complaint   Patient presents with    Wound Check     BLE         HISTORY of PRESENT ILLNESS HANH Covarrubias is a 66 y.o. female who presents today for wound/ulcer evaluation. History of Wound Context:  The patient has had a wound of b/l legs venous which was first noted approximately months ago. This has been treated unna boots. On their initial visit to the wound healing center, 3/26/20 ,  the patient has noted that the wound has not been improving. The patient has had similar previous wounds in the past.      Pt is not on abx at time of initial visit.       Wound/Ulcer Pain Timing/Severity: constant  Quality of pain: aching  Severity:  4 / 10   Modifying Factors: Pain worsens with walking  Associated Signs/Symptoms: edema, erythema, drainage and numbness    Ulcer Identification:  Ulcer Type: venous  Contributing Factors: edema, venous stasis and lymphedema    Diabetic/Pressure/Non Pressure Ulcers onl y:  Ulcer: Non-Pressure ulcer, fat layer exposed    4-  Debrided, cont tx and care, profore lyte with bhavana  If patient has diabetic lower extremity wounds    4-  Debrided, cont tx and care   Nunez Classification of diabetic lower extremity wounds:    Grade Description   []  0 No open wound   []  1 Superficial ulcer involving the full skin thickness   []  2 Deep ulcer involves ligament, tendon, joint capsule, or fascia  No bone involvement or abscess presence   []  3 Deep Ulcer with abcess formation and/or osteomyelitis   []  4 Localized gangrene   []  5 Extensive gangrene of the foot     Wound: N/A        PAST MEDICAL HISTORY      Diagnosis Date    Arthritis     Bleeding ulcer     Hypertension      Past Surgical History:   Procedure Laterality Date     SECTION      CHOLECYSTECTOMY, LAPAROSCOPIC  2016    ERCP  2016    HYSTERECTOMY      JOINT REPLACEMENT Left     left knee     History reviewed. No pertinent family history. Social History     Tobacco Use    Smoking status: Never Smoker    Smokeless tobacco: Never Used   Substance Use Topics    Alcohol use: No    Drug use: No     No Known Allergies  Current Outpatient Medications on File Prior to Encounter   Medication Sig Dispense Refill    furosemide (LASIX) 40 MG tablet Take 40 mg by mouth daily      potassium chloride (KLOR-CON) 20 MEQ packet Take 20 mEq by mouth daily      lisinopril-hydroCHLOROthiazide (PRINZIDE;ZESTORETIC) 20-25 MG per tablet Take 1 tablet by mouth daily      acetaminophen (TYLENOL) 500 MG tablet Take 500 mg by mouth every 6 hours as needed for Pain      metoprolol tartrate (LOPRESSOR) 50 MG tablet Take 1 tablet by mouth 2 times daily 60 tablet 3    amLODIPine (NORVASC) 10 MG tablet Take 1 tablet by mouth daily (Patient taking differently: Take 5 mg by mouth daily ) 30 tablet 3    pantoprazole (PROTONIX) 40 MG tablet Take 1 tablet by mouth every morning (before breakfast) 30 tablet 0    vitamin B-12 (CYANOCOBALAMIN) 100 MCG tablet Take 50 mcg by mouth daily      calcium carbonate (OSCAL) 500 MG TABS tablet Take 1,000 mg by mouth daily       No current facility-administered medications on file prior to encounter.         REVIEW OF SYSTEMS   ROS : All others Negative if blank [], Positive if [x]  General Vascular   [] Fevers [] Claudication   [] Chills [] Rest Pain   Skin Neurologic   [x] Tissue Loss [x] Lower extremity neuropathy     Objective:    /78   Pulse 91   Temp 97.4 °F (36.3 °C) (Temporal)   Resp 21   Ht 5' 4\" (1.626 m)   Wt 240 lb (108.9 kg)   BMI 41.20 kg/m²   Wt Readings from Last 3 Encounters:   21 240 lb (108.9 kg)   21 240 lb (108.9 kg)   20 200 lb (90.7 kg)       PHYSICAL EXAM   CONSTITUTIONAL: Awake, alert, cooperative   PSYCHIATRIC :  Oriented to time, place and person      normal insight to disease process  EXTREMITIES:   R LE Open wounds are noted   Skin color is abnormal with ulcers   Edema is  noted   Sensation intact to light touch   Palpation of the foot does cause pain   4/5 strength DF/PF  L LE Open wounds are noted   Skin color is abnormal with ulcers   Edema is  noted   Sensation intact to light touch   Palpation of the foot does cause pain   4/5 strength DF/PF  R dorsalis pedis 1 L dorsalis pedis 1   R posterior tibial 1 L posterior tibial 1     Assessment:     Problem List Items Addressed This Visit     Non-pressure chronic ulcer of other part of right lower leg with fat layer exposed ([address]    Relevant Medications    lidocaine (XYLOCAINE) 4 % external solution    Other Relevant Orders    Initiate Outpatient Wound Care Protocol    Non-pressure chronic ulcer of lower leg, left, with fat layer exposed ([address]    Relevant Medications    lidocaine (XYLOCAINE) 4 % external solution    Other Relevant Orders    Initiate Outpatient Wound Care Protocol    Venous stasis ulcer with edema of lower leg (HCC) - Primary    Relevant Medications    lidocaine (XYLOCAINE) 4 % external solution    Other Relevant Orders    Initiate Outpatient Wound Care Protocol          Pre Debridement Measurements:  Are located in the [place]  Documentation Flow Sheet  Post Debridement Measurements:  Wound/Ulcer Descriptions are Pre Debridement except measurements:     Incision 07/08/16 Abdomen Mid;Right;Upper; Outer (Active)   Number of days: 8824       Wound 03/26/21 Leg Right;Posterior #1 (Active)   Wound Image   04/21/21 0934   Dressing Status New dressing applied 04/16/21 1106   Wound Cleansed Cleansed with saline 04/16/21 1106   Dressing/Treatment ABD;Collagen 04/16/21 1106   Offloading for Diabetic Foot Ulcers No offloading required 04/16/21 1106   Wound Length (cm) 2.8 cm 04/21/21 0934   Wound Width (cm) 5 cm 04/21/21 0224   Wound Depth (cm) 0.1 cm 04/21/21 0934   Wound Surface Area (cm^2) 14 cm^2 04/21/21 0934   Change in Wound Size % (l*w) -81.82 04/21/21 0934   Wound Volume (cm^3) 1.4 cm^3 04/21/21 0934   Wound Healing % 39 04/21/21 0934   Post-Procedure Length (cm) 2.8 cm 04/21/21 1001   Post-Procedure Width (cm) 5 cm 04/21/21 1001   Post-Procedure Depth (cm) 0.2 cm 04/21/21 1001   Post-Procedure Surface Area (cm^2) 14 cm^2 04/21/21 1001   Post-Procedure Volume (cm^3) 2.8 cm^3 04/21/21 1001   Wound Assessment Pink/red;Pale granulation tissue 04/21/21 0934   Drainage Amount Small 04/21/21 0934   Drainage Description Yellow;Serosanguinous 04/21/21 0934   Odor None 04/21/21 0934   Ying-wound Assessment Fragile 04/21/21 0934   Number of days: 26       Wound 03/26/21 Pretibial Left #2 (Active)   Wound Image   04/21/21 0934   Dressing Status New dressing applied 04/16/21 1106   Wound Cleansed Cleansed with saline 04/16/21 1106   Dressing/Treatment ABD;Collagen 04/16/21 1106   Offloading for Diabetic Foot Ulcers No offloading required 04/16/21 1106   Wound Length (cm) 6.9 cm 04/21/21 0934   Wound Width (cm) 10.2 cm 04/21/21 0934   Wound Depth (cm) 0.1 cm 04/21/21 0934   Wound Surface Area (cm^2) 70.38 cm^2 04/21/21 0934   Change in Wound Size % (l*w) 34.8 04/21/21 0934   Wound Volume (cm^3) 7.04 cm^3 04/21/21 0934   Wound Healing % 67 04/21/21 0934   Post-Procedure Length (cm) 6.9 cm 04/21/21 1001   Post-Procedure Width (cm) 10.2 cm 04/21/21 1001   Post-Procedure Depth (cm) 0.2 cm 04/21/21 1001   Post-Procedure Surface Area (cm^2) 70.38 cm^2 04/21/21 1001   Post-Procedure Volume (cm^3) 14.08 cm^3 04/21/21 1001   Wound Assessment Pink/red;Pale granulation tissue;Slough 04/21/21 0934   Drainage Amount Moderate 04/21/21 0934   Drainage Description Yellow; Thick 04/21/21 0934   Odor None 04/21/21 0934   Ying-wound Assessment Fragile 04/21/21 0934   Number of days: 26          Procedure Note  Indications:  Based on my examination of this dressing and profore lite. Change weekly at wound care center      Treatment Orders: complete antibiotic as prescribed.        [facility] followup visit __________1 WEEK, DR PAREDES__wednesday________________  (Please note your next appointment above and if you are unable to keep, kindly give a 24 hour notice.  Thank you.)     Physician signature:__________________________        If you experience any of the following, please call the [facility] during business hours:     * Increase in Pain  * Temperature over 101  * Increase in drainage from your wound  * Drainage with a foul odor  * Bleeding  * Increase in swelling  * Need for compression bandage changes due to slippage, breakthrough drainage.     If you need medical attention outside of the business hours of the [facility] please contact your PCP or go to the nearest emergency room.                                                     Electronically signed by Victoria Hernández DPM on 4/21/2021 at 10:07 AM

## 2021-04-28 ENCOUNTER — HOSPITAL ENCOUNTER (OUTPATIENT)
Dept: WOUND CARE | Age: 71
Discharge: HOME | End: 2021-04-28
Payer: MEDICARE

## 2021-04-28 VITALS
RESPIRATION RATE: 18 BRPM | SYSTOLIC BLOOD PRESSURE: 128 MMHG | DIASTOLIC BLOOD PRESSURE: 76 MMHG | TEMPERATURE: 96.6 F | HEIGHT: 64 IN | WEIGHT: 240 LBS | HEART RATE: 76 BPM | BODY MASS INDEX: 40.97 KG/M2

## 2021-04-28 DIAGNOSIS — L97.812 NON-PRESSURE CHRONIC ULCER OF OTHER PART OF RIGHT LOWER LEG WITH FAT LAYER EXPOSED (HCC): ICD-10-CM

## 2021-04-28 DIAGNOSIS — I83.899 VENOUS STASIS ULCER WITH EDEMA OF LOWER LEG (HCC): Primary | ICD-10-CM

## 2021-04-28 DIAGNOSIS — I83.009 VENOUS STASIS ULCER WITH EDEMA OF LOWER LEG (HCC): Primary | ICD-10-CM

## 2021-04-28 DIAGNOSIS — R60.9 VENOUS STASIS ULCER WITH EDEMA OF LOWER LEG (HCC): Primary | ICD-10-CM

## 2021-04-28 DIAGNOSIS — L97.909 VENOUS STASIS ULCER WITH EDEMA OF LOWER LEG (HCC): Primary | ICD-10-CM

## 2021-04-28 DIAGNOSIS — L97.922 NON-PRESSURE CHRONIC ULCER OF LOWER LEG, LEFT, WITH FAT LAYER EXPOSED (HCC): ICD-10-CM

## 2021-04-28 PROCEDURE — 11042 DBRDMT SUBQ TIS 1ST 20SQCM/<: CPT

## 2021-04-28 PROCEDURE — 6370000000 HC RX 637 (ALT 250 FOR IP): Performed by: PODIATRIST

## 2021-04-28 RX ORDER — CLOBETASOL PROPIONATE 0.5 MG/G
OINTMENT TOPICAL ONCE
Status: CANCELLED | OUTPATIENT
Start: 2021-04-28 | End: 2021-04-28

## 2021-04-28 RX ORDER — LIDOCAINE 50 MG/G
OINTMENT TOPICAL ONCE
Status: CANCELLED | OUTPATIENT
Start: 2021-04-28 | End: 2021-04-28

## 2021-04-28 RX ORDER — BETAMETHASONE DIPROPIONATE 0.05 %
OINTMENT (GRAM) TOPICAL ONCE
Status: CANCELLED | OUTPATIENT
Start: 2021-04-28 | End: 2021-04-28

## 2021-04-28 RX ORDER — GINSENG 100 MG
CAPSULE ORAL ONCE
Status: CANCELLED | OUTPATIENT
Start: 2021-04-28 | End: 2021-04-28

## 2021-04-28 RX ORDER — LIDOCAINE HYDROCHLORIDE 40 MG/ML
SOLUTION TOPICAL ONCE
Status: CANCELLED | OUTPATIENT
Start: 2021-04-28 | End: 2021-04-28

## 2021-04-28 RX ORDER — LIDOCAINE HYDROCHLORIDE 40 MG/ML
SOLUTION TOPICAL ONCE
Status: COMPLETED | OUTPATIENT
Start: 2021-04-28 | End: 2021-04-28

## 2021-04-28 RX ORDER — BACITRACIN, NEOMYCIN, POLYMYXIN B 400; 3.5; 5 [USP'U]/G; MG/G; [USP'U]/G
OINTMENT TOPICAL ONCE
Status: CANCELLED | OUTPATIENT
Start: 2021-04-28 | End: 2021-04-28

## 2021-04-28 RX ORDER — GENTAMICIN SULFATE 1 MG/G
OINTMENT TOPICAL ONCE
Status: CANCELLED | OUTPATIENT
Start: 2021-04-28 | End: 2021-04-28

## 2021-04-28 RX ORDER — BACITRACIN ZINC AND POLYMYXIN B SULFATE 500; 1000 [USP'U]/G; [USP'U]/G
OINTMENT TOPICAL ONCE
Status: CANCELLED | OUTPATIENT
Start: 2021-04-28 | End: 2021-04-28

## 2021-04-28 RX ORDER — LIDOCAINE 40 MG/G
CREAM TOPICAL ONCE
Status: CANCELLED | OUTPATIENT
Start: 2021-04-28 | End: 2021-04-28

## 2021-04-28 RX ORDER — TRAMADOL HYDROCHLORIDE 50 MG/1
100 TABLET ORAL 2 TIMES DAILY PRN
COMMUNITY
End: 2021-07-09

## 2021-04-28 RX ORDER — LIDOCAINE HYDROCHLORIDE 20 MG/ML
JELLY TOPICAL ONCE
Status: CANCELLED | OUTPATIENT
Start: 2021-04-28 | End: 2021-04-28

## 2021-04-28 RX ADMIN — LIDOCAINE HYDROCHLORIDE: 40 SOLUTION TOPICAL at 09:25

## 2021-04-28 NOTE — PROGRESS NOTES
Hypertension      Past Surgical History:   Procedure Laterality Date     SECTION      CHOLECYSTECTOMY, LAPAROSCOPIC  2016    ERCP  2016    HYSTERECTOMY      JOINT REPLACEMENT Left     left knee     History reviewed. No pertinent family history. Social History     Tobacco Use    Smoking status: Never Smoker    Smokeless tobacco: Never Used   Substance Use Topics    Alcohol use: No    Drug use: No     No Known Allergies  Current Outpatient Medications on File Prior to Encounter   Medication Sig Dispense Refill    traMADol (ULTRAM) 50 MG tablet Take 50 mg by mouth 2 times daily as needed for Pain.  furosemide (LASIX) 40 MG tablet Take 40 mg by mouth daily      potassium chloride (KLOR-CON) 20 MEQ packet Take 20 mEq by mouth daily      lisinopril-hydroCHLOROthiazide (PRINZIDE;ZESTORETIC) 20-25 MG per tablet Take 1 tablet by mouth daily      acetaminophen (TYLENOL) 500 MG tablet Take 500 mg by mouth every 6 hours as needed for Pain      metoprolol tartrate (LOPRESSOR) 50 MG tablet Take 1 tablet by mouth 2 times daily 60 tablet 3    amLODIPine (NORVASC) 10 MG tablet Take 1 tablet by mouth daily (Patient taking differently: Take 5 mg by mouth daily ) 30 tablet 3    pantoprazole (PROTONIX) 40 MG tablet Take 1 tablet by mouth every morning (before breakfast) 30 tablet 0    vitamin B-12 (CYANOCOBALAMIN) 100 MCG tablet Take 50 mcg by mouth daily      calcium carbonate (OSCAL) 500 MG TABS tablet Take 1,000 mg by mouth daily       No current facility-administered medications on file prior to encounter.         REVIEW OF SYSTEMS   ROS : All others Negative if blank [], Positive if [x]  General Vascular   [] Fevers [] Claudication   [] Chills [] Rest Pain   Skin Neurologic   [x] Tissue Loss [x] Lower extremity neuropathy     Objective:    /76   Pulse 76   Temp 96.6 °F (35.9 °C) (Temporal)   Resp 18   Ht 5' 4\" (1.626 m)   Wt 240 lb (108.9 kg)   BMI 41.20 kg/m²   Wt Readings from Last 3 Encounters:   04/28/21 240 lb (108.9 kg)   04/21/21 240 lb (108.9 kg)   03/26/21 240 lb (108.9 kg)       PHYSICAL EXAM   CONSTITUTIONAL:   Awake, alert, cooperative   PSYCHIATRIC :  Oriented to time, place and person      normal insight to disease process  EXTREMITIES:   R LE Open wounds are noted   Skin color is abnormal with ulcers   Edema is  noted   Sensation intact to light touch   Palpation of the foot does cause pain   4/5 strength DF/PF  L LE Open wounds are noted   Skin color is abnormal with ulcers   Edema is  noted   Sensation intact to light touch   Palpation of the foot does cause pain   4/5 strength DF/PF  R dorsalis pedis 1 L dorsalis pedis 1   R posterior tibial 1 L posterior tibial 1     Assessment:     Problem List Items Addressed This Visit     Non-pressure chronic ulcer of other part of right lower leg with fat layer exposed ([address]    Relevant Orders    Initiate Outpatient Wound Care Protocol    Non-pressure chronic ulcer of lower leg, left, with fat layer exposed ([address]    Relevant Orders    Initiate Outpatient Wound Care Protocol    Venous stasis ulcer with edema of lower leg ([address] - Primary    Relevant Orders    Initiate Outpatient Wound Care Protocol          Pre Debridement Measurements:  Are located in the [place]  Documentation Flow Sheet  Post Debridement Measurements:  Wound/Ulcer Descriptions are Pre Debridement except measurements:     Incision 07/08/16 Abdomen Mid;Right;Upper; Outer (Active)   Number of days: 3850       Wound 03/26/21 Leg Right;Posterior #1 (Active)   Wound Image   04/21/21 0934   Dressing Status New dressing applied 04/21/21 1015   Wound Cleansed Cleansed with saline 04/21/21 1015   Dressing/Treatment ABD;Collagen 04/21/21 1015   Offloading for Diabetic Foot Ulcers No offloading required 04/21/21 1015   Wound Length (cm) 2.3 cm 04/28/21 0930   Wound Width (cm) 1 cm 04/28/21 0930   Wound Depth (cm) 0.1 cm 04/28/21 0930   Wound Surface Area (cm^2) 2.3 cm^2

## 2021-05-07 ENCOUNTER — HOSPITAL ENCOUNTER (OUTPATIENT)
Dept: WOUND CARE | Age: 71
Discharge: HOME | End: 2021-05-07
Payer: MEDICARE

## 2021-05-07 VITALS
DIASTOLIC BLOOD PRESSURE: 72 MMHG | HEART RATE: 92 BPM | SYSTOLIC BLOOD PRESSURE: 150 MMHG | TEMPERATURE: 97.4 F | RESPIRATION RATE: 18 BRPM

## 2021-05-07 DIAGNOSIS — L97.909 VENOUS STASIS ULCER WITH EDEMA OF LOWER LEG (HCC): Primary | ICD-10-CM

## 2021-05-07 DIAGNOSIS — R60.9 VENOUS STASIS ULCER WITH EDEMA OF LOWER LEG (HCC): Primary | ICD-10-CM

## 2021-05-07 DIAGNOSIS — I83.009 VENOUS STASIS ULCER WITH EDEMA OF LOWER LEG (HCC): Primary | ICD-10-CM

## 2021-05-07 DIAGNOSIS — L97.922 NON-PRESSURE CHRONIC ULCER OF LOWER LEG, LEFT, WITH FAT LAYER EXPOSED (HCC): ICD-10-CM

## 2021-05-07 DIAGNOSIS — I83.899 VENOUS STASIS ULCER WITH EDEMA OF LOWER LEG (HCC): Primary | ICD-10-CM

## 2021-05-07 DIAGNOSIS — L97.812 NON-PRESSURE CHRONIC ULCER OF OTHER PART OF RIGHT LOWER LEG WITH FAT LAYER EXPOSED (HCC): ICD-10-CM

## 2021-05-07 PROCEDURE — 6370000000 HC RX 637 (ALT 250 FOR IP): Performed by: PODIATRIST

## 2021-05-07 PROCEDURE — 11042 DBRDMT SUBQ TIS 1ST 20SQCM/<: CPT

## 2021-05-07 RX ORDER — GINSENG 100 MG
CAPSULE ORAL ONCE
Status: CANCELLED | OUTPATIENT
Start: 2021-05-07 | End: 2021-05-07

## 2021-05-07 RX ORDER — BETAMETHASONE DIPROPIONATE 0.05 %
OINTMENT (GRAM) TOPICAL ONCE
Status: CANCELLED | OUTPATIENT
Start: 2021-05-07 | End: 2021-05-07

## 2021-05-07 RX ORDER — LIDOCAINE HYDROCHLORIDE 20 MG/ML
JELLY TOPICAL ONCE
Status: CANCELLED | OUTPATIENT
Start: 2021-05-07 | End: 2021-05-07

## 2021-05-07 RX ORDER — BACITRACIN, NEOMYCIN, POLYMYXIN B 400; 3.5; 5 [USP'U]/G; MG/G; [USP'U]/G
OINTMENT TOPICAL ONCE
Status: CANCELLED | OUTPATIENT
Start: 2021-05-07 | End: 2021-05-07

## 2021-05-07 RX ORDER — LIDOCAINE HYDROCHLORIDE 40 MG/ML
SOLUTION TOPICAL ONCE
Status: COMPLETED | OUTPATIENT
Start: 2021-05-07 | End: 2021-05-07

## 2021-05-07 RX ORDER — GENTAMICIN SULFATE 1 MG/G
OINTMENT TOPICAL ONCE
Status: CANCELLED | OUTPATIENT
Start: 2021-05-07 | End: 2021-05-07

## 2021-05-07 RX ORDER — LIDOCAINE 40 MG/G
CREAM TOPICAL ONCE
Status: CANCELLED | OUTPATIENT
Start: 2021-05-07 | End: 2021-05-07

## 2021-05-07 RX ORDER — LIDOCAINE 50 MG/G
OINTMENT TOPICAL ONCE
Status: CANCELLED | OUTPATIENT
Start: 2021-05-07 | End: 2021-05-07

## 2021-05-07 RX ORDER — BACITRACIN ZINC AND POLYMYXIN B SULFATE 500; 1000 [USP'U]/G; [USP'U]/G
OINTMENT TOPICAL ONCE
Status: CANCELLED | OUTPATIENT
Start: 2021-05-07 | End: 2021-05-07

## 2021-05-07 RX ORDER — CLOBETASOL PROPIONATE 0.5 MG/G
OINTMENT TOPICAL ONCE
Status: CANCELLED | OUTPATIENT
Start: 2021-05-07 | End: 2021-05-07

## 2021-05-07 RX ORDER — LIDOCAINE HYDROCHLORIDE 40 MG/ML
SOLUTION TOPICAL ONCE
Status: CANCELLED | OUTPATIENT
Start: 2021-05-07 | End: 2021-05-07

## 2021-05-07 RX ADMIN — LIDOCAINE HYDROCHLORIDE: 40 SOLUTION TOPICAL at 08:50

## 2021-05-07 ASSESSMENT — PAIN - FUNCTIONAL ASSESSMENT: PAIN_FUNCTIONAL_ASSESSMENT: PREVENTS OR INTERFERES SOME ACTIVE ACTIVITIES AND ADLS

## 2021-05-07 ASSESSMENT — PAIN DESCRIPTION - LOCATION: LOCATION: FOOT

## 2021-05-07 ASSESSMENT — PAIN DESCRIPTION - PAIN TYPE: TYPE: ACUTE PAIN

## 2021-05-07 ASSESSMENT — PAIN DESCRIPTION - FREQUENCY: FREQUENCY: CONTINUOUS

## 2021-05-07 ASSESSMENT — PAIN SCALES - GENERAL: PAINLEVEL_OUTOF10: 10

## 2021-05-07 ASSESSMENT — PAIN DESCRIPTION - ONSET: ONSET: ON-GOING

## 2021-05-07 ASSESSMENT — PAIN DESCRIPTION - DESCRIPTORS: DESCRIPTORS: SHARP;THROBBING

## 2021-05-07 NOTE — PROGRESS NOTES
[facility]aling Center  History and Physical/Consultation  Podiatry    Referring Physician : Efe Maxwell DO  Alen Cantu  MEDICAL RECORD NUMBER:  [id number]  AGE: 66 y.o. GENDER: female  : 1950  EPISODE DATE:  2021  Subjective:     Chief Complaint   Patient presents with    Wound Check         HISTORY of PRESENT ILLNESS HPI     Alen Cantu is a 66 y.o. female who presents today for wound/ulcer evaluation. History of Wound Context:  The patient has had a wound of b/l legs venous which was first noted approximately months ago. This has been treated unna boots. On their initial visit to the wound healing center, 3/26/20 ,  the patient has noted that the wound has not been improving. The patient has had similar previous wounds in the past.      Pt is not on abx at time of initial visit.       Wound/Ulcer Pain Timing/Severity: constant  Quality of pain: aching  Severity:  4 / 10   Modifying Factors: Pain worsens with walking  Associated Signs/Symptoms: edema, erythema, drainage and numbness    Ulcer Identification:  Ulcer Type: venous  Contributing Factors: edema, venous stasis and lymphedema    Diabetic/Pressure/Non Pressure Ulcers onl y:  Ulcer: Non-Pressure ulcer, fat layer exposed    4-16-21  Debrided, cont tx and care, profore lyte with bhavana  If patient has diabetic lower extremity wounds    --21  Debrided, cont tx and care     --21  Debrided, healing well    --21  Debrided, cont tx and care   Nunez Classification of diabetic lower extremity wounds:    Grade Description   []  0 No open wound   []  1 Superficial ulcer involving the full skin thickness   []  2 Deep ulcer involves ligament, tendon, joint capsule, or fascia  No bone involvement or abscess presence   []  3 Deep Ulcer with abcess formation and/or osteomyelitis   []  4 Localized gangrene   []  5 Extensive gangrene of the foot     Wound: N/A        PAST MEDICAL HISTORY      Diagnosis Date    Arthritis     Bleeding ulcer     Hypertension      Past Surgical History:   Procedure Laterality Date     SECTION      CHOLECYSTECTOMY, LAPAROSCOPIC  2016    ERCP  2016    HYSTERECTOMY      JOINT REPLACEMENT Left     left knee     History reviewed. No pertinent family history. Social History     Tobacco Use    Smoking status: Never Smoker    Smokeless tobacco: Never Used   Substance Use Topics    Alcohol use: No    Drug use: No     No Known Allergies  Current Outpatient Medications on File Prior to Encounter   Medication Sig Dispense Refill    traMADol (ULTRAM) 50 MG tablet Take 50 mg by mouth 2 times daily as needed for Pain.  furosemide (LASIX) 40 MG tablet Take 40 mg by mouth daily      potassium chloride (KLOR-CON) 20 MEQ packet Take 20 mEq by mouth daily      lisinopril-hydroCHLOROthiazide (PRINZIDE;ZESTORETIC) 20-25 MG per tablet Take 1 tablet by mouth daily      metoprolol tartrate (LOPRESSOR) 50 MG tablet Take 1 tablet by mouth 2 times daily 60 tablet 3    amLODIPine (NORVASC) 10 MG tablet Take 1 tablet by mouth daily (Patient taking differently: Take 5 mg by mouth daily ) 30 tablet 3    pantoprazole (PROTONIX) 40 MG tablet Take 1 tablet by mouth every morning (before breakfast) 30 tablet 0    vitamin B-12 (CYANOCOBALAMIN) 100 MCG tablet Take 50 mcg by mouth daily      calcium carbonate (OSCAL) 500 MG TABS tablet Take 1,000 mg by mouth daily      acetaminophen (TYLENOL) 500 MG tablet Take 500 mg by mouth every 6 hours as needed for Pain       No current facility-administered medications on file prior to encounter.         REVIEW OF SYSTEMS   ROS : All others Negative if blank [], Positive if [x]  General Vascular   [] Fevers [] Claudication   [] Chills [] Rest Pain   Skin Neurologic   [x] Tissue Loss [x] Lower extremity neuropathy     Objective:    BP (!) 150/72   Pulse 92   Temp 97.4 °F (36.3 °C) (Temporal)   Resp 18   Wt Readings from Last 3 Encounters:   21 240 lb (108.9 kg) 04/21/21 240 lb (108.9 kg)   03/26/21 240 lb (108.9 kg)       PHYSICAL EXAM   CONSTITUTIONAL:   Awake, alert, cooperative   PSYCHIATRIC :  Oriented to time, place and person      normal insight to disease process  EXTREMITIES:   R LE Open wounds are noted   Skin color is abnormal with ulcers   Edema is  noted   Sensation intact to light touch   Palpation of the foot does cause pain   4/5 strength DF/PF  L LE Open wounds are noted   Skin color is abnormal with ulcers   Edema is  noted   Sensation intact to light touch   Palpation of the foot does cause pain   4/5 strength DF/PF  R dorsalis pedis 1 L dorsalis pedis 1   R posterior tibial 1 L posterior tibial 1     Assessment:     Problem List Items Addressed This Visit     Non-pressure chronic ulcer of other part of right lower leg with fat layer exposed ([address]    Relevant Orders    Initiate Outpatient Wound Care Protocol    Non-pressure chronic ulcer of lower leg, left, with fat layer exposed ([address]    Relevant Orders    Initiate Outpatient Wound Care Protocol    Venous stasis ulcer with edema of lower leg ([address] - Primary    Relevant Orders    Initiate Outpatient Wound Care Protocol          Pre Debridement Measurements:  Are located in the [facility]  Documentation Flow Sheet  Post Debridement Measurements:  Wound/Ulcer Descriptions are Pre Debridement except measurements:     Incision 07/08/16 Abdomen Mid;Right;Upper; Outer (Active)   Number of days: 6757       Wound 03/26/21 Leg Right;Posterior #1 (Active)   Wound Image   04/21/21 0934   Dressing Status New dressing applied 05/07/21 0936   Wound Cleansed Cleansed with saline 05/07/21 0936   Dressing/Treatment Hydrofera blue;Dry dressing 05/07/21 0936   Offloading for Diabetic Foot Ulcers No offloading required 04/28/21 1159   Wound Length (cm) 3.5 cm 05/07/21 0835   Wound Width (cm) 0.6 cm 05/07/21 0835   Wound Depth (cm) 0.1 cm 05/07/21 0835   Wound Surface Area (cm^2) 2.1 cm^2 05/07/21 0835   Change in Wound Size % (l*w) 72.73 05/07/21 0835   Wound Volume (cm^3) 0.21 cm^3 05/07/21 0835   Wound Healing % 91 05/07/21 0835   Post-Procedure Length (cm) 3.5 cm 05/07/21 0929   Post-Procedure Width (cm) 0.6 cm 05/07/21 0929   Post-Procedure Depth (cm) 0.2 cm 05/07/21 0929   Post-Procedure Surface Area (cm^2) 2.1 cm^2 05/07/21 0929   Post-Procedure Volume (cm^3) 0.42 cm^3 05/07/21 0929   Wound Assessment Pink/red;Slough 05/07/21 0835   Drainage Amount Small 05/07/21 0835   Drainage Description Yellow;Serosanguinous 05/07/21 0835   Odor None 05/07/21 0835   Ying-wound Assessment Intact 05/07/21 0835   Number of days: 42       Wound 03/26/21 Pretibial Left #2 (Active)   Wound Image   04/21/21 0934   Dressing Status New dressing applied 05/07/21 0936   Wound Cleansed Cleansed with saline 05/07/21 0936   Dressing/Treatment Hydrofera blue;Dry dressing 05/07/21 0936   Offloading for Diabetic Foot Ulcers No offloading required 04/28/21 1159   Wound Length (cm) 6.9 cm 05/07/21 0835   Wound Width (cm) 14.5 cm 05/07/21 0835   Wound Depth (cm) 0.2 cm 05/07/21 0835   Wound Surface Area (cm^2) 100.05 cm^2 05/07/21 0835   Change in Wound Size % (l*w) 7.32 05/07/21 0835   Wound Volume (cm^3) 20.01 cm^3 05/07/21 0835   Wound Healing % 7 05/07/21 0835   Post-Procedure Length (cm) 6.9 cm 05/07/21 0927   Post-Procedure Width (cm) 14.5 cm 05/07/21 0927   Post-Procedure Depth (cm) 0.2 cm 05/07/21 0927   Post-Procedure Surface Area (cm^2) 100.05 cm^2 05/07/21 0927   Post-Procedure Volume (cm^3) 20.01 cm^3 05/07/21 0927   Wound Assessment Pink/red;Pale granulation tissue;Slough 05/07/21 0835   Drainage Amount Moderate 05/07/21 0835   Drainage Description Yellow; Thick 05/07/21 0835   Odor None 05/07/21 0835   Ying-wound Assessment Blanchable erythema 05/07/21 0835   Number of days: 42          Procedure Note  Indications:  Based on my examination of this patient's wound(s)/ulcer(s) today, debridement is required to promote healing and evaluate the wound base.    Performed by: Victoria Hernández DPM    Consent obtained:  Yes    Time out taken:  Yes    Pain Control: Anesthetic  Anesthetic: 4% Lidocaine Liquid Topical     Debridement:Excisional Debridement    Using #15 blade scalpel the wound(s)/ulcer(s) was/were sharply debrided down through and including the removal of subcutaneous tissue. Devitalized Tissue Debrided:  fibrin, biofilm, slough and necrotic/eschar to stimulate bleeding to promote healing, post debridement good bleeding base and wound edges noted    Wound/Ulcer #: 1 and 2    Percent of Wound/Ulcer Debrided: 100%    Total Surface Area Debrided:  26 sq cm     Estimated Blood Loss:  Minimal  Hemostasis Achieved:  by pressure    Procedural Pain:  3  / 10   Post Procedural Pain:  4 / 10     Response to treatment:  Well tolerated by patient. A culture was done. Plan:     Pt is not a smoker   - Discussed relationship of smoking and negative affects on wound healing   - Emphasized importance of tobacco avoidace/cessation   - Script for nicotine patch given to patient   - Information regarding support groups for smoking cessation given    In my professional opinion and based off the information that is available at this time this patient has appropriate indication for HBO Therapy: Maybe    Treatment Note please see attached Discharge Instructions    Written patient dismissal instructions given to patient and signed by patient or POA. Discharge Instructions         Discharge Instructions        Visit Discharge/Physician Orders     Discharge condition: Stable     Assessment of pain at discharge:  none     Anesthetic used: 4% lidocaine solutiion     Discharge to: Home     Left via:Private automobile     Accompanied by: accompanied by grand daughter     [facility]F/H[facility]: [facility]      Dressing Orders: Clean bilateral lower leg ulcers with normal saline. Apply hydrofera blue dry dressing and unnaboot.  Change weekly at wound care center      Treatment Orders:         [facility] followup visit __________1 WEEK, DR PAREDES__friday_______________  (Please note your next appointment above and if you are unable to keep, kindly give a 24 hour notice. Thank you.)     Physician signature:__________________________        If you experience any of the following, please call the [facility] during business hours:     * Increase in Pain  * Temperature over 101  * Increase in drainage from your wound  * Drainage with a foul odor  * Bleeding  * Increase in swelling  * Need for compression bandage changes due to slippage, breakthrough drainage.     If you need medical attention outside of the business hours of the [facility] please contact your PCP or go to the nearest emergency room.              Electronically signed by Donzella Brittle, DPM on 5/7/2021 at 10:50 AM

## 2021-05-12 ENCOUNTER — HOSPITAL ENCOUNTER (OUTPATIENT)
Dept: WOUND CARE | Age: 71
Discharge: HOME | End: 2021-05-12
Payer: MEDICARE

## 2021-05-12 VITALS
HEIGHT: 64 IN | RESPIRATION RATE: 16 BRPM | DIASTOLIC BLOOD PRESSURE: 72 MMHG | TEMPERATURE: 97.1 F | WEIGHT: 240 LBS | HEART RATE: 80 BPM | BODY MASS INDEX: 40.97 KG/M2 | SYSTOLIC BLOOD PRESSURE: 140 MMHG

## 2021-05-12 DIAGNOSIS — I83.009 VENOUS STASIS ULCER WITH EDEMA OF LOWER LEG (HCC): Primary | ICD-10-CM

## 2021-05-12 DIAGNOSIS — I83.899 VENOUS STASIS ULCER WITH EDEMA OF LOWER LEG (HCC): Primary | ICD-10-CM

## 2021-05-12 DIAGNOSIS — L97.812 NON-PRESSURE CHRONIC ULCER OF OTHER PART OF RIGHT LOWER LEG WITH FAT LAYER EXPOSED (HCC): ICD-10-CM

## 2021-05-12 DIAGNOSIS — L97.922 NON-PRESSURE CHRONIC ULCER OF LOWER LEG, LEFT, WITH FAT LAYER EXPOSED (HCC): ICD-10-CM

## 2021-05-12 DIAGNOSIS — R60.9 VENOUS STASIS ULCER WITH EDEMA OF LOWER LEG (HCC): Primary | ICD-10-CM

## 2021-05-12 DIAGNOSIS — L97.909 VENOUS STASIS ULCER WITH EDEMA OF LOWER LEG (HCC): Primary | ICD-10-CM

## 2021-05-12 PROCEDURE — 11042 DBRDMT SUBQ TIS 1ST 20SQCM/<: CPT

## 2021-05-12 RX ORDER — LIDOCAINE HYDROCHLORIDE 20 MG/ML
JELLY TOPICAL ONCE
Status: CANCELLED | OUTPATIENT
Start: 2021-05-12 | End: 2021-05-12

## 2021-05-12 RX ORDER — BACITRACIN, NEOMYCIN, POLYMYXIN B 400; 3.5; 5 [USP'U]/G; MG/G; [USP'U]/G
OINTMENT TOPICAL ONCE
Status: CANCELLED | OUTPATIENT
Start: 2021-05-12 | End: 2021-05-12

## 2021-05-12 RX ORDER — LIDOCAINE HYDROCHLORIDE 40 MG/ML
SOLUTION TOPICAL ONCE
Status: CANCELLED | OUTPATIENT
Start: 2021-05-12 | End: 2021-05-12

## 2021-05-12 RX ORDER — LIDOCAINE 50 MG/G
OINTMENT TOPICAL ONCE
Status: CANCELLED | OUTPATIENT
Start: 2021-05-12 | End: 2021-05-12

## 2021-05-12 RX ORDER — BETAMETHASONE DIPROPIONATE 0.05 %
OINTMENT (GRAM) TOPICAL ONCE
Status: CANCELLED | OUTPATIENT
Start: 2021-05-12 | End: 2021-05-12

## 2021-05-12 RX ORDER — GENTAMICIN SULFATE 1 MG/G
OINTMENT TOPICAL ONCE
Status: CANCELLED | OUTPATIENT
Start: 2021-05-12 | End: 2021-05-12

## 2021-05-12 RX ORDER — CLOBETASOL PROPIONATE 0.5 MG/G
OINTMENT TOPICAL ONCE
Status: CANCELLED | OUTPATIENT
Start: 2021-05-12 | End: 2021-05-12

## 2021-05-12 RX ORDER — BACITRACIN ZINC AND POLYMYXIN B SULFATE 500; 1000 [USP'U]/G; [USP'U]/G
OINTMENT TOPICAL ONCE
Status: CANCELLED | OUTPATIENT
Start: 2021-05-12 | End: 2021-05-12

## 2021-05-12 RX ORDER — GINSENG 100 MG
CAPSULE ORAL ONCE
Status: CANCELLED | OUTPATIENT
Start: 2021-05-12 | End: 2021-05-12

## 2021-05-12 RX ORDER — LIDOCAINE HYDROCHLORIDE 40 MG/ML
SOLUTION TOPICAL ONCE
Status: DISCONTINUED | OUTPATIENT
Start: 2021-05-12 | End: 2021-05-13 | Stop reason: HOSPADM

## 2021-05-12 RX ORDER — LIDOCAINE 40 MG/G
CREAM TOPICAL ONCE
Status: CANCELLED | OUTPATIENT
Start: 2021-05-12 | End: 2021-05-12

## 2021-05-12 ASSESSMENT — PAIN DESCRIPTION - LOCATION: LOCATION: FOOT

## 2021-05-12 NOTE — PROGRESS NOTES
Wound Healing Center  History and Physical/Consultation  Podiatry    Referring Physician : Burt Morris DO  Halie Cole  MEDICAL RECORD NUMBER:  [id number]  AGE: 66 y.o. GENDER: female  : 1950  EPISODE DATE:  2021  Subjective:     Chief Complaint   Patient presents with    Wound Check     bilateral legs         HISTORY of PRESENT ILLNESS HPI     Halie Cole is a 66 y.o. female who presents today for wound/ulcer evaluation. History of Wound Context:  The patient has had a wound of b/l legs venous which was first noted approximately months ago. This has been treated unna boots. On their initial visit to the wound healing center, 3/26/20 ,  the patient has noted that the wound has not been improving. The patient has had similar previous wounds in the past.      Pt is not on abx at time of initial visit.       Wound/Ulcer Pain Timing/Severity: constant  Quality of pain: aching  Severity:  4 / 10   Modifying Factors: Pain worsens with walking  Associated Signs/Symptoms: edema, erythema, drainage and numbness    Ulcer Identification:  Ulcer Type: venous  Contributing Factors: edema, venous stasis and lymphedema    Diabetic/Pressure/Non Pressure Ulcers onl y:  Ulcer: Non-Pressure ulcer, fat layer exposed    4-16-21  Debrided, cont tx and care, profore lyte with bhavana  If patient has diabetic lower extremity wounds    --21  Debrided, cont tx and care     -21  Debrided, healing well    5--21  Debrided, cont tx and care     --21  Debrided, cont tx and care   Nunez Classification of diabetic lower extremity wounds:    Grade Description   []  0 No open wound   []  1 Superficial ulcer involving the full skin thickness   []  2 Deep ulcer involves ligament, tendon, joint capsule, or fascia  No bone involvement or abscess presence   []  3 Deep Ulcer with abcess formation and/or osteomyelitis   []  4 Localized gangrene   []  5 Extensive gangrene of the foot     Wound: N/A        PAST MEDICAL HISTORY      Diagnosis Date    Arthritis     Bleeding ulcer     Hypertension      Past Surgical History:   Procedure Laterality Date     SECTION      CHOLECYSTECTOMY, LAPAROSCOPIC  2016    ERCP  2016    HYSTERECTOMY      JOINT REPLACEMENT Left     left knee     History reviewed. No pertinent family history. Social History     Tobacco Use    Smoking status: Never Smoker    Smokeless tobacco: Never Used   Substance Use Topics    Alcohol use: No    Drug use: No     No Known Allergies  Current Outpatient Medications on File Prior to Encounter   Medication Sig Dispense Refill    traMADol (ULTRAM) 50 MG tablet Take 50 mg by mouth 2 times daily as needed for Pain.  furosemide (LASIX) 40 MG tablet Take 40 mg by mouth daily      potassium chloride (KLOR-CON) 20 MEQ packet Take 20 mEq by mouth daily      lisinopril-hydroCHLOROthiazide (PRINZIDE;ZESTORETIC) 20-25 MG per tablet Take 1 tablet by mouth daily      acetaminophen (TYLENOL) 500 MG tablet Take 500 mg by mouth every 6 hours as needed for Pain      metoprolol tartrate (LOPRESSOR) 50 MG tablet Take 1 tablet by mouth 2 times daily 60 tablet 3    amLODIPine (NORVASC) 10 MG tablet Take 1 tablet by mouth daily (Patient taking differently: Take 5 mg by mouth daily ) 30 tablet 3    pantoprazole (PROTONIX) 40 MG tablet Take 1 tablet by mouth every morning (before breakfast) 30 tablet 0    vitamin B-12 (CYANOCOBALAMIN) 100 MCG tablet Take 50 mcg by mouth daily      calcium carbonate (OSCAL) 500 MG TABS tablet Take 1,000 mg by mouth daily       No current facility-administered medications on file prior to encounter.         REVIEW OF SYSTEMS   ROS : All others Negative if blank [], Positive if [x]  General Vascular   [] Fevers [] Claudication   [] Chills [] Rest Pain   Skin Neurologic   [x] Tissue Loss [x] Lower extremity neuropathy     Objective:    BP (!) 140/72   Pulse 80   Temp 97.1 °F (36.2 °C) (Temporal)   Resp 16 Ht 5' 4\" (1.626 m)   Wt 240 lb (108.9 kg)   BMI 41.20 kg/m²   Wt Readings from Last 3 Encounters:   05/12/21 240 lb (108.9 kg)   04/28/21 240 lb (108.9 kg)   04/21/21 240 lb (108.9 kg)       PHYSICAL EXAM   CONSTITUTIONAL:   Awake, alert, cooperative   PSYCHIATRIC :  Oriented to time, place and person      normal insight to disease process  EXTREMITIES:   R LE Open wounds are noted   Skin color is abnormal with ulcers   Edema is  noted   Sensation intact to light touch   Palpation of the foot does cause pain   4/5 strength DF/PF  L LE Open wounds are noted   Skin color is abnormal with ulcers   Edema is  noted   Sensation intact to light touch   Palpation of the foot does cause pain   4/5 strength DF/PF  R dorsalis pedis 1 L dorsalis pedis 1   R posterior tibial 1 L posterior tibial 1     Assessment:     Problem List Items Addressed This Visit     Non-pressure chronic ulcer of other part of right lower leg with fat layer exposed ([address]    Relevant Medications    lidocaine (XYLOCAINE) 4 % external solution (Start on 5/12/2021 11:00 AM)    Other Relevant Orders    Initiate Outpatient Wound Care Protocol    Non-pressure chronic ulcer of lower leg, left, with fat layer exposed ([address]    Relevant Medications    lidocaine (XYLOCAINE) 4 % external solution (Start on 5/12/2021 11:00 AM)    Other Relevant Orders    Initiate Outpatient Wound Care Protocol    Venous stasis ulcer with edema of lower leg ([facility]) - Primary    Relevant Medications    lidocaine (XYLOCAINE) 4 % external solution (Start on 5/12/2021 11:00 AM)    Other Relevant Orders    Initiate Outpatient Wound Care Protocol          Pre Debridement Measurements:  Are located in the [facility]  Documentation Flow Sheet  Post Debridement Measurements:  Wound/Ulcer Descriptions are Pre Debridement except measurements:     Incision 07/08/16 Abdomen Mid;Right;Upper; Outer (Active)   Number of days: 2895       Wound 03/26/21 Leg Right;Posterior #1 (Active)   Wound Image 04/21/21 0934   Dressing Status New dressing applied 05/07/21 0936   Wound Cleansed Cleansed with saline 05/07/21 0936   Dressing/Treatment Hydrofera blue;Dry dressing 05/07/21 0936   Offloading for Diabetic Foot Ulcers No offloading required 04/28/21 1159   Wound Length (cm) 2.5 cm 05/12/21 1027   Wound Width (cm) 0.5 cm 05/12/21 1027   Wound Depth (cm) 0.1 cm 05/12/21 1027   Wound Surface Area (cm^2) 1.25 cm^2 05/12/21 1027   Change in Wound Size % (l*w) 83.77 05/12/21 1027   Wound Volume (cm^3) 0.12 cm^3 05/12/21 1027   Wound Healing % 95 05/12/21 1027   Post-Procedure Length (cm) 3.5 cm 05/07/21 0929   Post-Procedure Width (cm) 0.6 cm 05/07/21 0929   Post-Procedure Depth (cm) 0.2 cm 05/07/21 0929   Post-Procedure Surface Area (cm^2) 2.1 cm^2 05/07/21 0929   Post-Procedure Volume (cm^3) 0.42 cm^3 05/07/21 0929   Wound Assessment Pink/red;Slough 05/12/21 1027   Drainage Amount Small 05/12/21 1027   Drainage Description Yellow;Serosanguinous 05/12/21 1027   Odor None 05/12/21 1027   Ying-wound Assessment Intact 05/12/21 1027   Number of days: 47       Wound 03/26/21 Pretibial Left #2 (Active)   Wound Image   04/21/21 0934   Dressing Status New dressing applied 05/07/21 0936   Wound Cleansed Cleansed with saline 05/07/21 0936   Dressing/Treatment Hydrofera blue;Dry dressing 05/07/21 0936   Offloading for Diabetic Foot Ulcers No offloading required 04/28/21 1159   Wound Length (cm) 6.7 cm 05/12/21 1027   Wound Width (cm) 15 cm 05/12/21 1027   Wound Depth (cm) 0.2 cm 05/12/21 1027   Wound Surface Area (cm^2) 100.5 cm^2 05/12/21 1027   Change in Wound Size % (l*w) 6.9 05/12/21 1027   Wound Volume (cm^3) 20.1 cm^3 05/12/21 1027   Wound Healing % 7 05/12/21 1027   Post-Procedure Length (cm) 6.9 cm 05/07/21 0927   Post-Procedure Width (cm) 14.5 cm 05/07/21 0927   Post-Procedure Depth (cm) 0.2 cm 05/07/21 0927   Post-Procedure Surface Area (cm^2) 100.05 cm^2 05/07/21 0927   Post-Procedure Volume (cm^3) 20.01 cm^3 05/07/21 4076   Wound Assessment Pink/red;Pale granulation tissue;Slough 05/12/21 1027   Drainage Amount Moderate 05/12/21 1027   Drainage Description Yellow; Thick 05/12/21 1027   Odor None 05/12/21 1027   Ying-wound Assessment Blanchable erythema 05/12/21 1027   Number of days: 47          Procedure Note  Indications:  Based on my examination of this patient's wound(s)/ulcer(s) today, debridement is required to promote healing and evaluate the wound base. Performed by: Earnstine Section, DPM    Consent obtained:  Yes    Time out taken:  Yes    Pain Control: Anesthetic  Anesthetic: 4% Lidocaine Liquid Topical     Debridement:Excisional Debridement    Using #15 blade scalpel the wound(s)/ulcer(s) was/were sharply debrided down through and including the removal of subcutaneous tissue. Devitalized Tissue Debrided:  fibrin, biofilm, slough and necrotic/eschar to stimulate bleeding to promote healing, post debridement good bleeding base and wound edges noted    Wound/Ulcer #: 1 and 2    Percent of Wound/Ulcer Debrided: 100%    Total Surface Area Debrided:  26 sq cm     Estimated Blood Loss:  Minimal  Hemostasis Achieved:  by pressure    Procedural Pain:  3  / 10   Post Procedural Pain:  4 / 10     Response to treatment:  Well tolerated by patient. A culture was done. Plan:     Pt is not a smoker   - Discussed relationship of smoking and negative affects on wound healing   - Emphasized importance of tobacco avoidace/cessation   - Script for nicotine patch given to patient   - Information regarding support groups for smoking cessation given    In my professional opinion and based off the information that is available at this time this patient has appropriate indication for HBO Therapy: Maybe    Treatment Note please see attached Discharge Instructions    Written patient dismissal instructions given to patient and signed by patient or POA.          Discharge Instructions       Discharge Instructions      Visit

## 2021-05-19 ENCOUNTER — HOSPITAL ENCOUNTER (OUTPATIENT)
Dept: WOUND CARE | Age: 71
Discharge: HOME | End: 2021-05-19
Payer: MEDICARE

## 2021-05-19 VITALS
TEMPERATURE: 97.2 F | DIASTOLIC BLOOD PRESSURE: 70 MMHG | SYSTOLIC BLOOD PRESSURE: 142 MMHG | HEART RATE: 83 BPM | RESPIRATION RATE: 18 BRPM

## 2021-05-19 DIAGNOSIS — L97.812 NON-PRESSURE CHRONIC ULCER OF OTHER PART OF RIGHT LOWER LEG WITH FAT LAYER EXPOSED (HCC): ICD-10-CM

## 2021-05-19 DIAGNOSIS — L97.922 NON-PRESSURE CHRONIC ULCER OF LOWER LEG, LEFT, WITH FAT LAYER EXPOSED (HCC): ICD-10-CM

## 2021-05-19 DIAGNOSIS — L97.909 VENOUS STASIS ULCER WITH EDEMA OF LOWER LEG (HCC): Primary | ICD-10-CM

## 2021-05-19 DIAGNOSIS — I83.899 VENOUS STASIS ULCER WITH EDEMA OF LOWER LEG (HCC): Primary | ICD-10-CM

## 2021-05-19 DIAGNOSIS — I83.009 VENOUS STASIS ULCER WITH EDEMA OF LOWER LEG (HCC): Primary | ICD-10-CM

## 2021-05-19 DIAGNOSIS — R60.9 VENOUS STASIS ULCER WITH EDEMA OF LOWER LEG (HCC): Primary | ICD-10-CM

## 2021-05-19 PROCEDURE — 6370000000 HC RX 637 (ALT 250 FOR IP): Performed by: PODIATRIST

## 2021-05-19 PROCEDURE — 11042 DBRDMT SUBQ TIS 1ST 20SQCM/<: CPT

## 2021-05-19 RX ORDER — LIDOCAINE HYDROCHLORIDE 40 MG/ML
SOLUTION TOPICAL ONCE
Status: COMPLETED | OUTPATIENT
Start: 2021-05-19 | End: 2021-05-19

## 2021-05-19 RX ORDER — LIDOCAINE HYDROCHLORIDE 20 MG/ML
JELLY TOPICAL ONCE
Status: CANCELLED | OUTPATIENT
Start: 2021-05-19 | End: 2021-05-19

## 2021-05-19 RX ORDER — GINSENG 100 MG
CAPSULE ORAL ONCE
Status: CANCELLED | OUTPATIENT
Start: 2021-05-19 | End: 2021-05-19

## 2021-05-19 RX ORDER — GENTAMICIN SULFATE 1 MG/G
OINTMENT TOPICAL ONCE
Status: CANCELLED | OUTPATIENT
Start: 2021-05-19 | End: 2021-05-19

## 2021-05-19 RX ORDER — LIDOCAINE HYDROCHLORIDE 40 MG/ML
SOLUTION TOPICAL ONCE
Status: CANCELLED | OUTPATIENT
Start: 2021-05-19 | End: 2021-05-19

## 2021-05-19 RX ORDER — CLOBETASOL PROPIONATE 0.5 MG/G
OINTMENT TOPICAL ONCE
Status: CANCELLED | OUTPATIENT
Start: 2021-05-19 | End: 2021-05-19

## 2021-05-19 RX ORDER — LIDOCAINE 50 MG/G
OINTMENT TOPICAL ONCE
Status: CANCELLED | OUTPATIENT
Start: 2021-05-19 | End: 2021-05-19

## 2021-05-19 RX ORDER — BACITRACIN, NEOMYCIN, POLYMYXIN B 400; 3.5; 5 [USP'U]/G; MG/G; [USP'U]/G
OINTMENT TOPICAL ONCE
Status: CANCELLED | OUTPATIENT
Start: 2021-05-19 | End: 2021-05-19

## 2021-05-19 RX ORDER — BETAMETHASONE DIPROPIONATE 0.05 %
OINTMENT (GRAM) TOPICAL ONCE
Status: CANCELLED | OUTPATIENT
Start: 2021-05-19 | End: 2021-05-19

## 2021-05-19 RX ORDER — BACITRACIN ZINC AND POLYMYXIN B SULFATE 500; 1000 [USP'U]/G; [USP'U]/G
OINTMENT TOPICAL ONCE
Status: CANCELLED | OUTPATIENT
Start: 2021-05-19 | End: 2021-05-19

## 2021-05-19 RX ORDER — LIDOCAINE 40 MG/G
CREAM TOPICAL ONCE
Status: CANCELLED | OUTPATIENT
Start: 2021-05-19 | End: 2021-05-19

## 2021-05-19 RX ADMIN — LIDOCAINE HYDROCHLORIDE: 40 SOLUTION TOPICAL at 11:05

## 2021-05-19 NOTE — PROGRESS NOTES
[facility]  History and Physical/Consultation  Podiatry    Referring Physician : Lalitha Ruelas DO  Germán Vences  MEDICAL RECORD NUMBER:  [id number]  AGE: 66 y.o. GENDER: female  : 1950  EPISODE DATE:  2021  Subjective:     Chief Complaint   Patient presents with    Wound Check         HISTORY of PRESENT ILLNESS HANH Vences is a 66 y.o. female who presents today for wound/ulcer evaluation. History of Wound Context:  The patient has had a wound of b/l legs venous which was first noted approximately months ago. This has been treated unna boots. On their initial visit to the wound healing center, 3/26/20 ,  the patient has noted that the wound has not been improving. The patient has had similar previous wounds in the past.      Pt is not on abx at time of initial visit.       Wound/Ulcer Pain Timing/Severity: constant  Quality of pain: aching  Severity:  4 / 10   Modifying Factors: Pain worsens with walking  Associated Signs/Symptoms: edema, erythema, drainage and numbness    Ulcer Identification:  Ulcer Type: venous  Contributing Factors: edema, venous stasis and lymphedema    Diabetic/Pressure/Non Pressure Ulcers onl y:  Ulcer: Non-Pressure ulcer, fat layer exposed    4-16-21  Debrided, cont tx and care, profore lyte with bhavana  If patient has diabetic lower extremity wounds    4--21  Debrided, cont tx and care     --21  Debrided, healing well    5--21  Debrided, cont tx and care     5--21  Debrided, cont tx and care     --21  Debrided, cont tx and care    Nunez Classification of diabetic lower extremity wounds:    Grade Description   []  0 No open wound   []  1 Superficial ulcer involving the full skin thickness   []  2 Deep ulcer involves ligament, tendon, joint capsule, or fascia  No bone involvement or abscess presence   []  3 Deep Ulcer with abcess formation and/or osteomyelitis   []  4 Localized gangrene   []  5 Extensive gangrene of the foot Wound: N/A        PAST MEDICAL HISTORY      Diagnosis Date    Arthritis     Bleeding ulcer     Hypertension      Past Surgical History:   Procedure Laterality Date     SECTION      CHOLECYSTECTOMY, LAPAROSCOPIC  2016    ERCP  2016    HYSTERECTOMY      JOINT REPLACEMENT Left     left knee     History reviewed. No pertinent family history. Social History     Tobacco Use    Smoking status: Never Smoker    Smokeless tobacco: Never Used   Vaping Use    Vaping Use: Never used   Substance Use Topics    Alcohol use: No    Drug use: No     No Known Allergies  Current Outpatient Medications on File Prior to Encounter   Medication Sig Dispense Refill    traMADol (ULTRAM) 50 MG tablet Take 50 mg by mouth 2 times daily as needed for Pain.  furosemide (LASIX) 40 MG tablet Take 40 mg by mouth daily      potassium chloride (KLOR-CON) 20 MEQ packet Take 20 mEq by mouth daily      lisinopril-hydroCHLOROthiazide (PRINZIDE;ZESTORETIC) 20-25 MG per tablet Take 1 tablet by mouth daily      acetaminophen (TYLENOL) 500 MG tablet Take 500 mg by mouth every 6 hours as needed for Pain      metoprolol tartrate (LOPRESSOR) 50 MG tablet Take 1 tablet by mouth 2 times daily 60 tablet 3    amLODIPine (NORVASC) 10 MG tablet Take 1 tablet by mouth daily (Patient taking differently: Take 5 mg by mouth daily ) 30 tablet 3    pantoprazole (PROTONIX) 40 MG tablet Take 1 tablet by mouth every morning (before breakfast) 30 tablet 0    vitamin B-12 (CYANOCOBALAMIN) 100 MCG tablet Take 50 mcg by mouth daily      calcium carbonate (OSCAL) 500 MG TABS tablet Take 1,000 mg by mouth daily       No current facility-administered medications on file prior to encounter.        REVIEW OF SYSTEMS   ROS : All others Negative if blank [], Positive if [x]  General Vascular   [] Fevers [] Claudication   [] Chills [] Rest Pain   Skin Neurologic   [x] Tissue Loss [x] Lower extremity neuropathy     Objective:    BP (!) 142/70   Pulse 83   Temp 97.2 °F (36.2 °C) (Temporal)   Resp 18   Wt Readings from Last 3 Encounters:   05/12/21 240 lb (108.9 kg)   04/28/21 240 lb (108.9 kg)   04/21/21 240 lb (108.9 kg)       PHYSICAL EXAM   CONSTITUTIONAL:   Awake, alert, cooperative   PSYCHIATRIC :  Oriented to time, place and person      normal insight to disease process  EXTREMITIES:   R LE Open wounds are noted   Skin color is abnormal with ulcers   Edema is  noted   Sensation intact to light touch   Palpation of the foot does cause pain   4/5 strength DF/PF  L LE Open wounds are noted   Skin color is abnormal with ulcers   Edema is  noted   Sensation intact to light touch   Palpation of the foot does cause pain   4/5 strength DF/PF  R dorsalis pedis 1 L dorsalis pedis 1   R posterior tibial 1 L posterior tibial 1     Assessment:     Problem List Items Addressed This Visit     Non-pressure chronic ulcer of other part of right lower leg with fat layer exposed ([address]    Relevant Orders    Initiate Outpatient Wound Care Protocol    Non-pressure chronic ulcer of lower leg, left, with fat layer exposed ([address]    Relevant Orders    Initiate Outpatient Wound Care Protocol    Venous stasis ulcer with edema of lower leg ([address] - Primary    Relevant Orders    Initiate Outpatient Wound Care Protocol          Pre Debridement Measurements:  Are located in the [facility]  Documentation Flow Sheet  Post Debridement Measurements:  Wound/Ulcer Descriptions are Pre Debridement except measurements:     Incision 07/08/16 Abdomen Mid;Right;Upper; Outer (Active)   Number of days: 8702       Wound 03/26/21 Leg Right;Posterior #1 (Active)   Wound Image   05/19/21 1048   Dressing Status New dressing applied 05/12/21 1134   Wound Cleansed Cleansed with saline 05/12/21 1134   Dressing/Treatment Hydrofera blue;Dry dressing 05/12/21 1134   Offloading for Diabetic Foot Ulcers No offloading required 05/12/21 1134   Wound Length (cm) 0.2 cm 05/19/21 1048   Wound Width (cm) 0.2 cm 05/19/21 1048   Wound Depth (cm) 0.1 cm 05/19/21 1048   Wound Surface Area (cm^2) 0.04 cm^2 05/19/21 1048   Change in Wound Size % (l*w) 99.48 05/19/21 1048   Wound Volume (cm^3) 0 cm^3 05/19/21 1048   Wound Healing % 100 05/19/21 1048   Post-Procedure Length (cm) 0.2 cm 05/19/21 1107   Post-Procedure Width (cm) 0.2 cm 05/19/21 1107   Post-Procedure Depth (cm) 0.2 cm 05/19/21 1107   Post-Procedure Surface Area (cm^2) 0.04 cm^2 05/19/21 1107   Post-Procedure Volume (cm^3) 0.01 cm^3 05/19/21 1107   Wound Assessment Pink/red 05/19/21 1048   Drainage Amount Small 05/19/21 1048   Drainage Description Yellow;Serosanguinous 05/19/21 1048   Odor None 05/19/21 1048   Ying-wound Assessment Intact 05/19/21 1048   Number of days: 54       Wound 03/26/21 Pretibial Left #2 (Active)   Wound Image   05/19/21 1048   Dressing Status New dressing applied 05/12/21 1134   Wound Cleansed Cleansed with saline 05/12/21 1134   Dressing/Treatment Hydrofera blue;Dry dressing 05/12/21 1134   Offloading for Diabetic Foot Ulcers No offloading required 05/12/21 1134   Wound Length (cm) 6.2 cm 05/19/21 1048   Wound Width (cm) 14.5 cm 05/19/21 1048   Wound Depth (cm) 0.2 cm 05/19/21 1048   Wound Surface Area (cm^2) 89.9 cm^2 05/19/21 1048   Change in Wound Size % (l*w) 16.72 05/19/21 1048   Wound Volume (cm^3) 17.98 cm^3 05/19/21 1048   Wound Healing % 17 05/19/21 1048   Post-Procedure Length (cm) 6.2 cm 05/19/21 1107   Post-Procedure Width (cm) 14.5 cm 05/19/21 1107   Post-Procedure Depth (cm) 0.2 cm 05/19/21 1107   Post-Procedure Surface Area (cm^2) 89.9 cm^2 05/19/21 1107   Post-Procedure Volume (cm^3) 17.98 cm^3 05/19/21 1107   Wound Assessment Pink/red;Pale granulation tissue;Slough 05/19/21 1048   Drainage Amount Moderate 05/19/21 1048   Drainage Description Yellow;Green 05/19/21 1048   Odor None 05/19/21 1048   Ying-wound Assessment Blanchable erythema 05/19/21 1048   Number of days: 54          Procedure Note  Indications:  Based on my examination of this patient's wound(s)/ulcer(s) today, debridement is required to promote healing and evaluate the wound base. Performed by: Ja Barnard DPM    Consent obtained:  Yes    Time out taken:  Yes    Pain Control: Anesthetic  Anesthetic: 4% Lidocaine Liquid Topical     Debridement:Excisional Debridement    Using #15 blade scalpel the wound(s)/ulcer(s) was/were sharply debrided down through and including the removal of subcutaneous tissue. Devitalized Tissue Debrided:  fibrin, biofilm, slough and necrotic/eschar to stimulate bleeding to promote healing, post debridement good bleeding base and wound edges noted    Wound/Ulcer #: 1 and 2    Percent of Wound/Ulcer Debrided: 100%    Total Surface Area Debrided:  90 sq cm     Estimated Blood Loss:  Minimal  Hemostasis Achieved:  by pressure    Procedural Pain:  3  / 10   Post Procedural Pain:  4 / 10     Response to treatment:  Well tolerated by patient. A culture was done. Plan:     Pt is not a smoker   - Discussed relationship of smoking and negative affects on wound healing   - Emphasized importance of tobacco avoidace/cessation   - Script for nicotine patch given to patient   - Information regarding support groups for smoking cessation given    In my professional opinion and based off the information that is available at this time this patient has appropriate indication for HBO Therapy: Maybe    Treatment Note please see attached Discharge Instructions    Written patient dismissal instructions given to patient and signed by patient or POA.          Discharge Instructions         Discharge Instructions      Visit Discharge/Physician Orders     Discharge condition: Stable     Assessment of pain at discharge:  none     Anesthetic used: 4% lidocaine solutiion     Discharge to: Home     Left via:Private automobile     Accompanied by: accompanied by grand daughter     [facility]: [facility]      Dressing Orders: Clean bilateral lower leg ulcers with normal saline. Apply hydrofera blue dry dressing and unnaboot. Change weekly at wound care center    pad left lateral  foot well with foam     Treatment Orders:         [address] followup visit __________1 WEEK, DR PAREDES_______________  (Please note your next appointment above and if you are unable to keep, kindly give a 24 hour notice.  Thank you.)     Physician signature:__________________________        If you experience any of the following, please call the [facility] during business hours:     * Increase in Pain  * Temperature over 101  * Increase in drainage from your wound  * Drainage with a foul odor  * Bleeding  * Increase in swelling  * Need for compression bandage changes due to slippage, breakthrough drainage.     If you need medical attention outside of the business hours of the [facility] please contact your PCP or go to the nearest emergency room.                                            Electronically signed by Allen Galvin DPM on 5/19/2021 at 11:15 AM

## 2021-05-28 ENCOUNTER — HOSPITAL ENCOUNTER (OUTPATIENT)
Dept: WOUND CARE | Age: 71
Discharge: HOME | End: 2021-05-28
Payer: MEDICARE

## 2021-05-28 VITALS — DIASTOLIC BLOOD PRESSURE: 72 MMHG | TEMPERATURE: 96.7 F | SYSTOLIC BLOOD PRESSURE: 126 MMHG | RESPIRATION RATE: 20 BRPM

## 2021-05-28 DIAGNOSIS — I83.009 VENOUS STASIS ULCER WITH EDEMA OF LOWER LEG (HCC): Primary | ICD-10-CM

## 2021-05-28 DIAGNOSIS — L97.922 NON-PRESSURE CHRONIC ULCER OF LOWER LEG, LEFT, WITH FAT LAYER EXPOSED (HCC): ICD-10-CM

## 2021-05-28 DIAGNOSIS — L97.812 NON-PRESSURE CHRONIC ULCER OF OTHER PART OF RIGHT LOWER LEG WITH FAT LAYER EXPOSED (HCC): ICD-10-CM

## 2021-05-28 DIAGNOSIS — I83.899 VENOUS STASIS ULCER WITH EDEMA OF LOWER LEG (HCC): Primary | ICD-10-CM

## 2021-05-28 DIAGNOSIS — L97.909 VENOUS STASIS ULCER WITH EDEMA OF LOWER LEG (HCC): Primary | ICD-10-CM

## 2021-05-28 DIAGNOSIS — R60.9 VENOUS STASIS ULCER WITH EDEMA OF LOWER LEG (HCC): Primary | ICD-10-CM

## 2021-05-28 PROCEDURE — 6370000000 HC RX 637 (ALT 250 FOR IP): Performed by: PODIATRIST

## 2021-05-28 PROCEDURE — 11042 DBRDMT SUBQ TIS 1ST 20SQCM/<: CPT

## 2021-05-28 RX ORDER — GENTAMICIN SULFATE 1 MG/G
OINTMENT TOPICAL ONCE
Status: CANCELLED | OUTPATIENT
Start: 2021-05-28 | End: 2021-05-28

## 2021-05-28 RX ORDER — LIDOCAINE 40 MG/G
CREAM TOPICAL ONCE
Status: CANCELLED | OUTPATIENT
Start: 2021-05-28 | End: 2021-05-28

## 2021-05-28 RX ORDER — BETAMETHASONE DIPROPIONATE 0.05 %
OINTMENT (GRAM) TOPICAL ONCE
Status: CANCELLED | OUTPATIENT
Start: 2021-05-28 | End: 2021-05-28

## 2021-05-28 RX ORDER — CLOBETASOL PROPIONATE 0.5 MG/G
OINTMENT TOPICAL ONCE
Status: CANCELLED | OUTPATIENT
Start: 2021-05-28 | End: 2021-05-28

## 2021-05-28 RX ORDER — LIDOCAINE HYDROCHLORIDE 20 MG/ML
JELLY TOPICAL ONCE
Status: CANCELLED | OUTPATIENT
Start: 2021-05-28 | End: 2021-05-28

## 2021-05-28 RX ORDER — LIDOCAINE HYDROCHLORIDE 40 MG/ML
SOLUTION TOPICAL ONCE
Status: COMPLETED | OUTPATIENT
Start: 2021-05-28 | End: 2021-05-28

## 2021-05-28 RX ORDER — LIDOCAINE 50 MG/G
OINTMENT TOPICAL ONCE
Status: CANCELLED | OUTPATIENT
Start: 2021-05-28 | End: 2021-05-28

## 2021-05-28 RX ORDER — GINSENG 100 MG
CAPSULE ORAL ONCE
Status: CANCELLED | OUTPATIENT
Start: 2021-05-28 | End: 2021-05-28

## 2021-05-28 RX ORDER — BACITRACIN ZINC AND POLYMYXIN B SULFATE 500; 1000 [USP'U]/G; [USP'U]/G
OINTMENT TOPICAL ONCE
Status: CANCELLED | OUTPATIENT
Start: 2021-05-28 | End: 2021-05-28

## 2021-05-28 RX ORDER — LIDOCAINE HYDROCHLORIDE 40 MG/ML
SOLUTION TOPICAL ONCE
Status: CANCELLED | OUTPATIENT
Start: 2021-05-28 | End: 2021-05-28

## 2021-05-28 RX ORDER — BACITRACIN, NEOMYCIN, POLYMYXIN B 400; 3.5; 5 [USP'U]/G; MG/G; [USP'U]/G
OINTMENT TOPICAL ONCE
Status: CANCELLED | OUTPATIENT
Start: 2021-05-28 | End: 2021-05-28

## 2021-05-28 RX ADMIN — LIDOCAINE HYDROCHLORIDE: 40 SOLUTION TOPICAL at 09:59

## 2021-05-28 ASSESSMENT — PAIN SCALES - GENERAL: PAINLEVEL_OUTOF10: 0

## 2021-05-28 NOTE — DISCHARGE INSTR - COC
Continuity of Care Form    Patient Name: Ivett Hancock   :  1950  MRN:  [id number]    Admit date:  (Not on file)  Discharge date:  ***    Code Status Order: Prior   Advance Directives:     Admitting Physician:  No admitting provider for patient encounter. PCP: Juan Miller DO    Discharging Nurse: [facility] Unit/Room#: No information available for this encounter. Discharging Unit Phone Number: ***    Emergency Contact:   Extended Emergency Contact Information  Primary Emergency Contact: [place] Phone: [phone]  Mobile Phone: [phone]  Relation: Grandchild  Secondary Emergency Contact: [address] [place] [address] Phone: [phone]  Relation: Child    Past Surgical History:  Past Surgical History:   Procedure Laterality Date     SECTION      CHOLECYSTECTOMY, LAPAROSCOPIC  2016    ERCP  2016    HYSTERECTOMY      JOINT REPLACEMENT Left     left knee       Immunization History: There is no immunization history on file for this patient. Active Problems:  Patient Active Problem List   Diagnosis Code    Pancreatitis, acute K85.90    Non-pressure chronic ulcer of other part of right lower leg with fat layer exposed ([address] L97.812    Non-pressure chronic ulcer of lower leg, left, with fat layer exposed ([address] D36.281    Venous stasis ulcer with edema of lower leg ([address] I83.009, I83.899, L97.909, R60.9       Isolation/Infection:   Isolation          No Isolation        Patient Infection Status     None to display          Nurse Assessment:  Last Vital Signs: There were no vitals taken for this visit.     Last documented pain score (0-10 scale):    Last Weight:   Wt Readings from Last 1 Encounters:   21 240 lb (108.9 kg)     Mental Status:  {IP PT MENTAL STATUS:}    IV Access:  { NICOLE IV ACCESS:302822779}    Nursing Mobility/ADLs:  Walking   {[facility] DME UVO}  Transfer  {[facility] DME KRHD:615342698}  Bathing  {[facility] DME UCWF:753470160}  Dressing  {[facility] DME SYXC:516580180}  Toileting  {[facility] DME DXMN:578334271}  Feeding  {[facility] DME TDER:525878234}  Med Admin  {[facility] DME UUBB:423661037}  Med Delivery   { NICOLE MED Delivery:158860192}    Wound Care Documentation and Therapy:  Incision 07/08/16 Abdomen Mid;Right;Upper; Outer (Active)   Number of days: 0888       Wound 03/26/21 Leg Right;Posterior #1 (Active)   Wound Image   05/19/21 1048   Dressing Status New dressing applied 05/19/21 1203   Wound Cleansed Cleansed with saline 05/19/21 1203   Dressing/Treatment Hydrofera blue;Dry dressing 05/19/21 1203   Offloading for Diabetic Foot Ulcers No offloading required 05/19/21 1203   Wound Length (cm) 0.2 cm 05/19/21 1048   Wound Width (cm) 0.2 cm 05/19/21 1048   Wound Depth (cm) 0.1 cm 05/19/21 1048   Wound Surface Area (cm^2) 0.04 cm^2 05/19/21 1048   Change in Wound Size % (l*w) 99.48 05/19/21 1048   Wound Volume (cm^3) 0 cm^3 05/19/21 1048   Wound Healing % 100 05/19/21 1048   Post-Procedure Length (cm) 0.2 cm 05/19/21 1107   Post-Procedure Width (cm) 0.2 cm 05/19/21 1107   Post-Procedure Depth (cm) 0.2 cm 05/19/21 1107   Post-Procedure Surface Area (cm^2) 0.04 cm^2 05/19/21 1107   Post-Procedure Volume (cm^3) 0.01 cm^3 05/19/21 1107   Wound Assessment Pink/red 05/19/21 1048   Drainage Amount Small 05/19/21 1048   Drainage Description Yellow;Serosanguinous 05/19/21 1048   Odor None 05/19/21 1048   Ying-wound Assessment Intact 05/19/21 1048   Number of days: 62       Wound 03/26/21 Pretibial Left #2 (Active)   Wound Image   05/19/21 1048   Dressing Status New dressing applied 05/19/21 1203   Wound Cleansed Cleansed with saline 05/19/21 1203   Dressing/Treatment Hydrofera blue;Dry dressing 05/19/21 1203   Offloading for Diabetic Foot Ulcers No offloading required 05/19/21 1203   Wound Length (cm) 6.2 cm 05/19/21 1048   Wound Width (cm) 14.5 cm 05/19/21 1048   Wound Depth (cm) 0.2 cm 05/19/21 1048   Wound Surface Area (cm^2) 89.9 cm^2 05/19/21 1048 Change in Wound Size % (l*w) 16.72 21 1048   Wound Volume (cm^3) 17.98 cm^3 21 1048   Wound Healing % 17 21 1048   Post-Procedure Length (cm) 6.2 cm 21 1107   Post-Procedure Width (cm) 14.5 cm 21 1107   Post-Procedure Depth (cm) 0.2 cm 21 1107   Post-Procedure Surface Area (cm^2) 89.9 cm^2 21 1107   Post-Procedure Volume (cm^3) 17.98 cm^3 21 1107   Wound Assessment Pink/red;Pale granulation tissue;Slough 21 1048   Drainage Amount Moderate 21 1048   Drainage Description Yellow;Green 21 1048   Odor None 21 1048   Ying-wound Assessment Blanchable erythema 21 1048   Number of days: 62        Elimination:  Continence:   · Bowel: {YES / MM:27139}  · Bladder: {YES / PL:75667}  Urinary Catheter: {Urinary Catheter:149349246}   Colostomy/Ileostomy/Ileal Conduit: {YES / SM:01644}       Date of Last BM: ***  No intake or output data in the 24 hours ending 21 0843  No intake/output data recorded.     Safety Concerns:     [address] Safety Concerns:653442009}    Impairments/Disabilities:      [address] Impairments/Disabilities:044579175}    Nutrition Therapy:  Current Nutrition Therapy:   [address] Diet List:326373782}    Routes of Feeding: {[facility] DME Other Feedings:843553156}  Liquids: {Slp liquid thickness:84843}  Daily Fluid Restriction: {[facility] DME Yes amt example:791785802}  Last Modified Barium Swallow with Video (Video Swallowing Test): {Done Not Done KTQJ:686574057}    Treatments at the Time of Hospital Discharge:   Respiratory Treatments: ***  Oxygen Therapy:  {Therapy; copd oxygen:53302}  Ventilator:    { CC Vent PXON:284298258}    Rehab Therapies: {THERAPEUTIC INTERVENTION:1614749268}  Weight Bearing Status/Restrictions: [address]  Weight Bearin}  Other Medical Equipment (for information only, NOT a DME order):  {EQUIPMENT:815973818}  Other Treatments: ***    Patient's personal belongings (please select all that are sent with patient):  {[facility] DME Belongings:190058072}    RN SIGNATURE:  {Esignature:274051123}    CASE MANAGEMENT/SOCIAL WORK SECTION    Inpatient Status Date: ***    Readmission Risk Assessment Score:  Readmission Risk              Risk of Unplanned Readmission:  0           Discharging to Facility/ Agency   · Name:   · Address:  · Phone:  · Fax:    Dialysis Facility (if applicable)   · Name:  · Address:  · Dialysis Schedule:  · Phone:  · Fax:    / signature: {Esignature:819774298}    PHYSICIAN SECTION    Prognosis: {Prognosis:4663750353}    Condition at Discharge: [address] [place] [address] Patient Condition:498824725}    Rehab Potential (if transferring to Rehab): {Prognosis:6803683072}    Recommended Labs or Other Treatments After Discharge: ***    Physician Certification: I certify the above information and transfer of Alen Cantu  is necessary for the continuing treatment of the diagnosis listed and that she requires {Admit to Appropriate Level of Care:25385} for {GREATER/LESS:042712106} 30 days.      Update Admission H&P: {[facility] DME Changes in YNDPV:253176506}    PHYSICIAN SIGNATURE:  {Esignature:982527048}

## 2021-05-28 NOTE — DISCHARGE INSTR - COC
Continuity of Care Form    Patient Name: Marsha Mcdonald   :  1950  MRN:  [id number]    Admit date:  (Not on file)  Discharge date:  ***    Code Status Order: Prior   Advance Directives:     Admitting Physician:  No admitting provider for patient encounter. PCP: Brendon Mckenna DO    Discharging Nurse: [facility] Unit/Room#: No information available for this encounter. Discharging Unit Phone Number: ***    Emergency Contact:   Extended Emergency Contact Information  Primary Emergency Contact: [address] [place] Phone: [phone]  Mobile Phone: [phone]  Relation: Grandchild  Secondary Emergency Contact: [address] [place] [address] Phone: [phone]  Relation: Child    Past Surgical History:  Past Surgical History:   Procedure Laterality Date     SECTION      CHOLECYSTECTOMY, LAPAROSCOPIC  2016    ERCP  2016    HYSTERECTOMY      JOINT REPLACEMENT Left     left knee       Immunization History: There is no immunization history on file for this patient. Active Problems:  Patient Active Problem List   Diagnosis Code    Pancreatitis, acute K85.90    Non-pressure chronic ulcer of other part of right lower leg with fat layer exposed ([address] L97.812    Non-pressure chronic ulcer of lower leg, left, with fat layer exposed ([address] N15.420    Venous stasis ulcer with edema of lower leg ([address] I83.009, I83.899, L97.909, R60.9       Isolation/Infection:   Isolation          No Isolation        Patient Infection Status     None to display          Nurse Assessment:  Last Vital Signs: There were no vitals taken for this visit.     Last documented pain score (0-10 scale):    Last Weight:   Wt Readings from Last 1 Encounters:   21 240 lb (108.9 kg)     Mental Status:  {IP PT MENTAL STATUS:}    IV Access:  { NICOLE IV ACCESS:917217348}    Nursing Mobility/ADLs:  Walking   {[facility] PWSF:786649145}  Transfer  {[facility] DHYF:482888163}  Bathing  {[facility] OSRF:248340127}  Dressing  {CHP DME GPKQ:365905273}  Toileting  {CHP DME WAE}  Feeding  {CHP DME PNOI:508986779}  Med Admin  {CHP DME GEZL:670465518}  Med Delivery   { NICOLE MED Delivery:889802218}    Wound Care Documentation and Therapy:  Incision 16 Abdomen Mid;Right;Upper; Outer (Active)   Number of days: 5274       Wound 21 Leg Right;Posterior #1 (Active)   Wound Image   21 1048   Dressing Status New dressing applied 21 1203   Wound Cleansed Cleansed with saline 21 1203   Dressing/Treatment Hydrofera blue;Dry dressing 21 1203   Offloading for Diabetic Foot Ulcers No offloading required 21 1203   Wound Length (cm) 0.2 cm 21 0945   Wound Width (cm) 0.2 cm 21 0945   Wound Depth (cm) 0.1 cm 21 0945   Wound Surface Area (cm^2) 0.04 cm^2 2145   Change in Wound Size % (l*w) 99.48 21 0945   Wound Volume (cm^3) 0 cm^3 21   Wound Healing % 100 21 0945   Post-Procedure Length (cm) 0.2 cm 21 1005   Post-Procedure Width (cm) 0.2 cm 21 1005   Post-Procedure Depth (cm) 0.2 cm 21 1005   Post-Procedure Surface Area (cm^2) 0.04 cm^2 21 1005   Post-Procedure Volume (cm^3) 0.01 cm^3 21 1005   Wound Assessment Pink/red 2145   Drainage Amount Scant 2145   Drainage Description Yellow;Serosanguinous 2145   Odor None 2145   Ying-wound Assessment Intact 21 0945   Number of days: 63       Wound 21 Pretibial Left #2 (Active)   Wound Image   21 1048   Dressing Status New dressing applied 21 1203   Wound Cleansed Cleansed with saline 21 1203   Dressing/Treatment Hydrofera blue;Dry dressing 21 1203   Offloading for Diabetic Foot Ulcers No offloading required 21 1203   Wound Length (cm) 6.5 cm 21 0945   Wound Width (cm) 14.5 cm 21 0945   Wound Depth (cm) 0.3 cm 21 0945   Wound Surface Area (cm^2) 94.25 cm^2 21 45   Change in Wound Size % (l*w) 12.69 21 0945   Wound Volume (cm^3) 28.28 cm^3 21 0945   Wound Healing % -31 21   Post-Procedure Length (cm) 6.5 cm 21   Post-Procedure Width (cm) 14.5 cm 21   Post-Procedure Depth (cm) 0.3 cm 21 100   Post-Procedure Surface Area (cm^2) 94.25 cm^2 21 100   Post-Procedure Volume (cm^3) 28.28 cm^3 21 1005   Wound Assessment Pink/red;Pale granulation tissue;Slough 21   Drainage Amount Small 21   Drainage Description Serosanguinous; Yellow 21   Odor None 21   Ying-wound Assessment Intact 21   Number of days: 63        Elimination:  Continence:   · Bowel: {YES / NJ:60119}  · Bladder: {YES / IR:34455}  Urinary Catheter: {Urinary Catheter:699776549}   Colostomy/Ileostomy/Ileal Conduit: {YES / ME:67065}       Date of Last BM: ***  No intake or output data in the 24 hours ending 21 1220  No intake/output data recorded.     Safety Concerns:     [facility] Safety Concerns:090301711}    Impairments/Disabilities:      [facility] Impairments/Disabilities:172525368}    Nutrition Therapy:  Current Nutrition Therapy:   [facility] Diet List:223126405}    Routes of Feeding: {CHP DME Other Feedings:871797972}  Liquids: {Slp liquid thickness:52986}  Daily Fluid Restriction: {CHP DME Yes amt example:850187980}  Last Modified Barium Swallow with Video (Video Swallowing Test): {Done Not Done FACP:167198239}    Treatments at the Time of Hospital Discharge:   Respiratory Treatments: ***  Oxygen Therapy:  {Therapy; copd oxygen:07264}  Ventilator:    { CC Vent FZYB:655670063}    Rehab Therapies: {THERAPEUTIC INTERVENTION:6302890297}  Weight Bearing Status/Restrictions: [address] [facility]  Weight Bearin}  Other Medical Equipment (for information only, NOT a DME order):  {EQUIPMENT:824317955}  Other Treatments: ***    Patient's personal belongings (please select all that are sent with patient):  {CHP DME Belongings:367625142}    RN SIGNATURE:  {Esignature:656933426}    CASE MANAGEMENT/SOCIAL WORK SECTION    Inpatient Status Date: ***    Readmission Risk Assessment Score:  Readmission Risk              Risk of Unplanned Readmission:  0           Discharging to Facility/ Agency   · Name:   · Address:  · Phone:  · Fax:    Dialysis Facility (if applicable)   · Name:  · Address:  · Dialysis Schedule:  · Phone:  · Fax:    / signature: {Esignature:038571381}    PHYSICIAN SECTION    Prognosis: {Prognosis:9708411641}    Condition at Discharge: [address] [place] [address] Patient Condition:424296610}    Rehab Potential (if transferring to Rehab): {Prognosis:8585230955}    Recommended Labs or Other Treatments After Discharge: ***    Physician Certification: I certify the above information and transfer of Chiquis Catalan  is necessary for the continuing treatment of the diagnosis listed and that she requires {Admit to Appropriate Level of Care:09444} for {GREATER/LESS:215211372} 30 days.      Update Admission H&P: {CHP DME Changes in GKVOK:221257837}    PHYSICIAN SIGNATURE:  {Esignature:398016300}

## 2021-05-28 NOTE — PLAN OF CARE
Problem: Wound:  Goal: Will show signs of wound healing; wound closure and no evidence of infection  Description: Will show signs of wound healing; wound closure and no evidence of infection  5/28/2021 1005 by [facility] EMILE Wills  Outcome: Ongoing  5/28/2021 0946 by [facility] Johann, RN  Outcome: Ongoing     Problem: Arterial:  Goal: Optimize blood flow for wound healing  Description: Optimize blood flow for wound healing  5/28/2021 1005 by [facility] EMILE Wills  Outcome: Ongoing  5/28/2021 0946 by GEETHA [facility] EMILE Wills  Outcome: Ongoing     Problem: Venous:  Goal: Signs of wound healing will improve  Description: Signs of wound healing will improve  5/28/2021 1005 by [id number] Deborah May RN  Outcome: Ongoing  5/28/2021 0946 by GE[facility]HA [facility] EMILE Wills  Outcome: Ongoing     Problem: Compression therapy:  Goal: Will be free from complications associated with compression therapy  Description: Will be free from complications associated with compression therapy  5/28/2021 1005 by [facility] EMILE Wills  Outcome: Ongoing  5/28/2021 0946 by GEETHA [facility] Johann, RN  Outcome: Ongoing     Problem: Falls - Risk of:  Goal: Will remain free from falls  Description: Will remain free from falls  5/28/2021 1005 by [facility] E[facility] Wills  Outcome: Ongoing  5/28/2021 0946 by GEETHA [facility] Johann RN  Outcome: Ongoing

## 2021-05-28 NOTE — PROGRESS NOTES
Extensive gangrene of the foot     Wound: N/A        PAST MEDICAL HISTORY      Diagnosis Date    Arthritis     Bleeding ulcer     Hypertension      Past Surgical History:   Procedure Laterality Date     SECTION      CHOLECYSTECTOMY, LAPAROSCOPIC  2016    ERCP  2016    HYSTERECTOMY      JOINT REPLACEMENT Left     left knee     History reviewed. No pertinent family history. Social History     Tobacco Use    Smoking status: Never Smoker    Smokeless tobacco: Never Used   Vaping Use    Vaping Use: Never used   Substance Use Topics    Alcohol use: No    Drug use: No     No Known Allergies  Current Outpatient Medications on File Prior to Encounter   Medication Sig Dispense Refill    traMADol (ULTRAM) 50 MG tablet Take 50 mg by mouth 2 times daily as needed for Pain.  furosemide (LASIX) 40 MG tablet Take 40 mg by mouth daily      potassium chloride (KLOR-CON) 20 MEQ packet Take 20 mEq by mouth daily      lisinopril-hydroCHLOROthiazide (PRINZIDE;ZESTORETIC) 20-25 MG per tablet Take 1 tablet by mouth daily      acetaminophen (TYLENOL) 500 MG tablet Take 500 mg by mouth every 6 hours as needed for Pain      metoprolol tartrate (LOPRESSOR) 50 MG tablet Take 1 tablet by mouth 2 times daily 60 tablet 3    amLODIPine (NORVASC) 10 MG tablet Take 1 tablet by mouth daily (Patient taking differently: Take 5 mg by mouth daily ) 30 tablet 3    pantoprazole (PROTONIX) 40 MG tablet Take 1 tablet by mouth every morning (before breakfast) 30 tablet 0    vitamin B-12 (CYANOCOBALAMIN) 100 MCG tablet Take 50 mcg by mouth daily      calcium carbonate (OSCAL) 500 MG TABS tablet Take 1,000 mg by mouth daily       No current facility-administered medications on file prior to encounter.        REVIEW OF SYSTEMS   ROS : All others Negative if blank [], Positive if [x]  General Vascular   [] Fevers [] Claudication   [] Chills [] Rest Pain   Skin Neurologic   [x] Tissue Loss [x] Lower extremity neuropathy     Objective:    /72   Temp 96.7 °F (35.9 °C) (Temporal)   Resp 20   Wt Readings from Last 3 Encounters:   05/12/21 240 lb (108.9 kg)   04/28/21 240 lb (108.9 kg)   04/21/21 240 lb (108.9 kg)       PHYSICAL EXAM   CONSTITUTIONAL:   Awake, alert, cooperative   PSYCHIATRIC :  Oriented to time, place and person      normal insight to disease process  EXTREMITIES:   R LE Open wounds are noted   Skin color is abnormal with ulcers   Edema is  noted   Sensation intact to light touch   Palpation of the foot does cause pain   4/5 strength DF/PF  L LE Open wounds are noted   Skin color is abnormal with ulcers   Edema is  noted   Sensation intact to light touch   Palpation of the foot does cause pain   4/5 strength DF/PF  R dorsalis pedis 1 L dorsalis pedis 1   R posterior tibial 1 L posterior tibial 1     Assessment:     Problem List Items Addressed This Visit     Non-pressure chronic ulcer of other part of right lower leg with fat layer exposed ([address]    Relevant Orders    Initiate Outpatient Wound Care Protocol    Non-pressure chronic ulcer of lower leg, left, with fat layer exposed ([address]    Relevant Orders    Initiate Outpatient Wound Care Protocol    Venous stasis ulcer with edema of lower leg ([address] - Primary    Relevant Orders    Initiate Outpatient Wound Care Protocol          Pre Debridement Measurements:  Are located in the [facility]  Documentation Flow Sheet  Post Debridement Measurements:  Wound/Ulcer Descriptions are Pre Debridement except measurements:     Incision 07/08/16 Abdomen Mid;Right;Upper; Outer (Active)   Number of days: 4690       Wound 03/26/21 Leg Right;Posterior #1 (Active)   Wound Image   05/19/21 1048   Dressing Status New dressing applied 05/19/21 1203   Wound Cleansed Cleansed with saline 05/19/21 1203   Dressing/Treatment Hydrofera blue;Dry dressing 05/19/21 1203   Offloading for Diabetic Foot Ulcers No offloading required 05/19/21 1203   Wound Length (cm) 0.2 cm 05/28/21 0985 Wound Width (cm) 0.2 cm 05/28/21 0945   Wound Depth (cm) 0.1 cm 05/28/21 0945   Wound Surface Area (cm^2) 0.04 cm^2 05/28/21 0945   Change in Wound Size % (l*w) 99.48 05/28/21 0945   Wound Volume (cm^3) 0 cm^3 05/28/21 0945   Wound Healing % 100 05/28/21 0945   Post-Procedure Length (cm) 0.2 cm 05/28/21 1005   Post-Procedure Width (cm) 0.2 cm 05/28/21 1005   Post-Procedure Depth (cm) 0.2 cm 05/28/21 1005   Post-Procedure Surface Area (cm^2) 0.04 cm^2 05/28/21 1005   Post-Procedure Volume (cm^3) 0.01 cm^3 05/28/21 1005   Wound Assessment Pink/red 05/28/21 0945   Drainage Amount Scant 05/28/21 0945   Drainage Description Yellow;Serosanguinous 05/28/21 0945   Odor None 05/28/21 0945   Ying-wound Assessment Intact 05/28/21 0945   Number of days: 63       Wound 03/26/21 Pretibial Left #2 (Active)   Wound Image   05/19/21 1048   Dressing Status New dressing applied 05/19/21 1203   Wound Cleansed Cleansed with saline 05/19/21 1203   Dressing/Treatment Hydrofera blue;Dry dressing 05/19/21 1203   Offloading for Diabetic Foot Ulcers No offloading required 05/19/21 1203   Wound Length (cm) 6.5 cm 05/28/21 0945   Wound Width (cm) 14.5 cm 05/28/21 0945   Wound Depth (cm) 0.3 cm 05/28/21 0945   Wound Surface Area (cm^2) 94.25 cm^2 05/28/21 0945   Change in Wound Size % (l*w) 12.69 05/28/21 0945   Wound Volume (cm^3) 28.28 cm^3 05/28/21 0945   Wound Healing % -31 05/28/21 0945   Post-Procedure Length (cm) 6.5 cm 05/28/21 1005   Post-Procedure Width (cm) 14.5 cm 05/28/21 1005   Post-Procedure Depth (cm) 0.3 cm 05/28/21 1005   Post-Procedure Surface Area (cm^2) 94.25 cm^2 05/28/21 1005   Post-Procedure Volume (cm^3) 28.28 cm^3 05/28/21 1005   Wound Assessment Pink/red;Pale granulation tissue;Slough 05/28/21 0945   Drainage Amount Small 05/28/21 0945   Drainage Description Serosanguinous; Yellow 05/28/21 0945   Odor None 05/28/21 0945   Ying-wound Assessment Intact 05/28/21 0945   Number of days: 63          Procedure Note  Indications:  Based on my examination of this patient's wound(s)/ulcer(s) today, debridement is required to promote healing and evaluate the wound base. Performed by: Timo Dumont DPM    Consent obtained:  Yes    Time out taken:  Yes    Pain Control: Anesthetic  Anesthetic: 4% Lidocaine Liquid Topical     Debridement:Excisional Debridement    Using #15 blade scalpel the wound(s)/ulcer(s) was/were sharply debrided down through and including the removal of subcutaneous tissue. Devitalized Tissue Debrided:  fibrin, biofilm, slough and necrotic/eschar to stimulate bleeding to promote healing, post debridement good bleeding base and wound edges noted    Wound/Ulcer #: 1 and 2    Percent of Wound/Ulcer Debrided: 100%    Total Surface Area Debrided:  90 sq cm     Estimated Blood Loss:  Minimal  Hemostasis Achieved:  by pressure    Procedural Pain:  3  / 10   Post Procedural Pain:  4 / 10     Response to treatment:  Well tolerated by patient. A culture was done. Plan:     Pt is not a smoker   - Discussed relationship of smoking and negative affects on wound healing   - Emphasized importance of tobacco avoidace/cessation   - Script for nicotine patch given to patient   - Information regarding support groups for smoking cessation given    In my professional opinion and based off the information that is available at this time this patient has appropriate indication for HBO Therapy: Maybe    Treatment Note please see attached Discharge Instructions    Written patient dismissal instructions given to patient and signed by patient or POA.          Discharge Instructions        Discharge Instructions      Visit Discharge/Physician Orders     Discharge condition: Stable     Assessment of pain at discharge:  none     Anesthetic used: 4% lidocaine solutiion     Discharge to: Home     Left via:Private automobile     Accompanied by: accompanied by grand daughter     ECF/[facility]: [facility] [place], [facility].      Dressing Orders: Clean bilateral lower leg ulcers with normal saline. Apply bhavana dry dressing and unnaboot. Change weekly at wound care center    pad left lateral  foot well with foam     Treatment Orders:         [facility] followup visit __________1 WEEK, DR PAREDES_______________  (Please note your next appointment above and if you are unable to keep, kindly give a 24 hour notice.  Thank you.)     Physician signature:__________________________        If you experience any of the following, please call the [facility] [employer] during business hours:     * Increase in Pain  * Temperature over 101  * Increase in drainage from your wound  * Drainage with a foul odor  * Bleeding  * Increase in swelling  * Need for compression bandage changes due to slippage, breakthrough drainage.     If you need medical attention outside of the business hours of the [address] please contact your PCP or go to the nearest emergency room.                                                                 Electronically signed by Hamzah Rutledge DPM on 5/28/2021 at 10:09 AM

## 2021-06-04 ENCOUNTER — HOSPITAL ENCOUNTER (OUTPATIENT)
Dept: WOUND CARE | Age: 71
Discharge: HOME | End: 2021-06-04
Payer: MEDICARE

## 2021-06-04 VITALS
WEIGHT: 240 LBS | HEIGHT: 64 IN | BODY MASS INDEX: 40.97 KG/M2 | HEART RATE: 88 BPM | RESPIRATION RATE: 18 BRPM | SYSTOLIC BLOOD PRESSURE: 160 MMHG | TEMPERATURE: 97.5 F | DIASTOLIC BLOOD PRESSURE: 78 MMHG

## 2021-06-04 DIAGNOSIS — L97.909 VENOUS STASIS ULCER WITH EDEMA OF LOWER LEG (HCC): Primary | ICD-10-CM

## 2021-06-04 DIAGNOSIS — L97.922 NON-PRESSURE CHRONIC ULCER OF LOWER LEG, LEFT, WITH FAT LAYER EXPOSED (HCC): ICD-10-CM

## 2021-06-04 DIAGNOSIS — I83.009 VENOUS STASIS ULCER WITH EDEMA OF LOWER LEG (HCC): Primary | ICD-10-CM

## 2021-06-04 DIAGNOSIS — I83.899 VENOUS STASIS ULCER WITH EDEMA OF LOWER LEG (HCC): Primary | ICD-10-CM

## 2021-06-04 DIAGNOSIS — L97.812 NON-PRESSURE CHRONIC ULCER OF OTHER PART OF RIGHT LOWER LEG WITH FAT LAYER EXPOSED (HCC): ICD-10-CM

## 2021-06-04 DIAGNOSIS — R60.9 VENOUS STASIS ULCER WITH EDEMA OF LOWER LEG (HCC): Primary | ICD-10-CM

## 2021-06-04 PROCEDURE — 11042 DBRDMT SUBQ TIS 1ST 20SQCM/<: CPT

## 2021-06-04 PROCEDURE — 6370000000 HC RX 637 (ALT 250 FOR IP): Performed by: PODIATRIST

## 2021-06-04 RX ORDER — LIDOCAINE HYDROCHLORIDE 40 MG/ML
SOLUTION TOPICAL ONCE
Status: CANCELLED | OUTPATIENT
Start: 2021-06-04 | End: 2021-06-04

## 2021-06-04 RX ORDER — LIDOCAINE HYDROCHLORIDE 20 MG/ML
JELLY TOPICAL ONCE
Status: CANCELLED | OUTPATIENT
Start: 2021-06-04 | End: 2021-06-04

## 2021-06-04 RX ORDER — LIDOCAINE 50 MG/G
OINTMENT TOPICAL ONCE
Status: CANCELLED | OUTPATIENT
Start: 2021-06-04 | End: 2021-06-04

## 2021-06-04 RX ORDER — GINSENG 100 MG
CAPSULE ORAL ONCE
Status: CANCELLED | OUTPATIENT
Start: 2021-06-04 | End: 2021-06-04

## 2021-06-04 RX ORDER — CLOBETASOL PROPIONATE 0.5 MG/G
OINTMENT TOPICAL ONCE
Status: CANCELLED | OUTPATIENT
Start: 2021-06-04 | End: 2021-06-04

## 2021-06-04 RX ORDER — LIDOCAINE 40 MG/G
CREAM TOPICAL ONCE
Status: CANCELLED | OUTPATIENT
Start: 2021-06-04 | End: 2021-06-04

## 2021-06-04 RX ORDER — BACITRACIN, NEOMYCIN, POLYMYXIN B 400; 3.5; 5 [USP'U]/G; MG/G; [USP'U]/G
OINTMENT TOPICAL ONCE
Status: CANCELLED | OUTPATIENT
Start: 2021-06-04 | End: 2021-06-04

## 2021-06-04 RX ORDER — LIDOCAINE HYDROCHLORIDE 40 MG/ML
SOLUTION TOPICAL ONCE
Status: COMPLETED | OUTPATIENT
Start: 2021-06-04 | End: 2021-06-04

## 2021-06-04 RX ORDER — GENTAMICIN SULFATE 1 MG/G
OINTMENT TOPICAL ONCE
Status: CANCELLED | OUTPATIENT
Start: 2021-06-04 | End: 2021-06-04

## 2021-06-04 RX ORDER — BACITRACIN ZINC AND POLYMYXIN B SULFATE 500; 1000 [USP'U]/G; [USP'U]/G
OINTMENT TOPICAL ONCE
Status: CANCELLED | OUTPATIENT
Start: 2021-06-04 | End: 2021-06-04

## 2021-06-04 RX ORDER — BETAMETHASONE DIPROPIONATE 0.05 %
OINTMENT (GRAM) TOPICAL ONCE
Status: CANCELLED | OUTPATIENT
Start: 2021-06-04 | End: 2021-06-04

## 2021-06-04 RX ADMIN — LIDOCAINE HYDROCHLORIDE: 40 SOLUTION TOPICAL at 10:28

## 2021-06-04 ASSESSMENT — PAIN SCALES - GENERAL: PAINLEVEL_OUTOF10: 0

## 2021-06-04 NOTE — PLAN OF CARE
Problem: Wound:  Goal: Will show signs of wound healing; wound closure and no evidence of infection  Description: Will show signs of wound healing; wound closure and no evidence of infection  Outcome: Ongoing     Problem: Arterial:  Goal: Optimize blood flow for wound healing  Description: Optimize blood flow for wound healing  Outcome: Ongoing     Problem: Venous:  Goal: Signs of wound healing will improve  Description: Signs of wound healing will improve  Outcome: Ongoing     Problem: Compression therapy:  Goal: Will be free from complications associated with compression therapy  Description: Will be free from complications associated with compression therapy  Outcome: Ongoing     Problem: Falls - Risk of:  Goal: Will remain free from falls  Description: Will remain free from falls  Outcome: Ongoing

## 2021-06-04 NOTE — PROGRESS NOTES
[facility]  History and Physical/Consultation  Podiatry    Referring Physician : Vania Zepeda DO  Aidan Lewis  MEDICAL RECORD NUMBER:  [id number]  AGE: 66 y.o. GENDER: female  : 1950  EPISODE DATE:  2021  Subjective:     Chief Complaint   Patient presents with    Wound Check     bilateral legs         HISTORY of PRESENT ILLNESS HANH Lewis is a 66 y.o. female who presents today for wound/ulcer evaluation. History of Wound Context:  The patient has had a wound of b/l legs venous which was first noted approximately months ago. This has been treated unna boots. On their initial visit to the wound healing center, 3/26/20 ,  the patient has noted that the wound has not been improving. The patient has had similar previous wounds in the past.      Pt is not on abx at time of initial visit.       Wound/Ulcer Pain Timing/Severity: constant  Quality of pain: aching  Severity:  4 / 10   Modifying Factors: Pain worsens with walking  Associated Signs/Symptoms: edema, erythema, drainage and numbness    Ulcer Identification:  Ulcer Type: venous  Contributing Factors: edema, venous stasis and lymphedema    Diabetic/Pressure/Non Pressure Ulcers onl y:  Ulcer: Non-Pressure ulcer, fat layer exposed    4-16-21  Debrided, cont tx and care, profore lyte with bhavana  If patient has diabetic lower extremity wounds    4--21  Debrided, cont tx and care     -21  Debrided, healing well    5--21  Debrided, cont tx and care     --21  Debrided, cont tx and care     -21  Debrided, cont tx and care    -21  Debrided, cont tx and care    6--21  Debrided, cont tx and care     Nunez Classification of diabetic lower extremity wounds:    Grade Description   []  0 No open wound   []  1 Superficial ulcer involving the full skin thickness   []  2 Deep ulcer involves ligament, tendon, joint capsule, or fascia  No bone involvement or abscess presence   []  3 Deep Ulcer with abcess formation and/or osteomyelitis   []  4 Localized gangrene   []  5 Extensive gangrene of the foot     Wound: N/A        PAST MEDICAL HISTORY      Diagnosis Date    Arthritis     Bleeding ulcer     Hypertension      Past Surgical History:   Procedure Laterality Date     SECTION      CHOLECYSTECTOMY, LAPAROSCOPIC  2016    ERCP  2016    HYSTERECTOMY      JOINT REPLACEMENT Left     left knee     History reviewed. No pertinent family history. Social History     Tobacco Use    Smoking status: Never Smoker    Smokeless tobacco: Never Used   Vaping Use    Vaping Use: Never used   Substance Use Topics    Alcohol use: No    Drug use: No     No Known Allergies  Current Outpatient Medications on File Prior to Encounter   Medication Sig Dispense Refill    traMADol (ULTRAM) 50 MG tablet Take 100 mg by mouth 2 times daily as needed for Pain.  furosemide (LASIX) 40 MG tablet Take 40 mg by mouth daily      potassium chloride (KLOR-CON) 20 MEQ packet Take 20 mEq by mouth daily      lisinopril-hydroCHLOROthiazide (PRINZIDE;ZESTORETIC) 20-25 MG per tablet Take 1 tablet by mouth daily      acetaminophen (TYLENOL) 500 MG tablet Take 500 mg by mouth every 6 hours as needed for Pain      metoprolol tartrate (LOPRESSOR) 50 MG tablet Take 1 tablet by mouth 2 times daily 60 tablet 3    amLODIPine (NORVASC) 10 MG tablet Take 1 tablet by mouth daily (Patient taking differently: Take 5 mg by mouth daily ) 30 tablet 3    pantoprazole (PROTONIX) 40 MG tablet Take 1 tablet by mouth every morning (before breakfast) 30 tablet 0    vitamin B-12 (CYANOCOBALAMIN) 100 MCG tablet Take 50 mcg by mouth daily      calcium carbonate (OSCAL) 500 MG TABS tablet Take 1,000 mg by mouth daily       No current facility-administered medications on file prior to encounter.        REVIEW OF SYSTEMS   ROS : All others Negative if blank [], Positive if [x]  General Vascular   [] Fevers [] Claudication   [] Chills [] Rest Pain   Skin Neurologic   [x] Tissue Loss [x] Lower extremity neuropathy     Objective:    BP (!) 160/78   Pulse 88   Temp 97.5 °F (36.4 °C) (Temporal)   Resp 18   Ht 5' 4\" (1.626 m)   Wt 240 lb (108.9 kg)   BMI 41.20 kg/m²   Wt Readings from Last 3 Encounters:   06/04/21 240 lb (108.9 kg)   05/12/21 240 lb (108.9 kg)   04/28/21 240 lb (108.9 kg)       PHYSICAL EXAM   CONSTITUTIONAL:   Awake, alert, cooperative   PSYCHIATRIC :  Oriented to time, place and person      normal insight to disease process  EXTREMITIES:   R LE Open wounds are noted   Skin color is abnormal with ulcers   Edema is  noted   Sensation intact to light touch   Palpation of the foot does cause pain   4/5 strength DF/PF  L LE Open wounds are noted   Skin color is abnormal with ulcers   Edema is  noted   Sensation intact to light touch   Palpation of the foot does cause pain   4/5 strength DF/PF  R dorsalis pedis 1 L dorsalis pedis 1   R posterior tibial 1 L posterior tibial 1     Assessment:     Problem List Items Addressed This Visit     Non-pressure chronic ulcer of other part of right lower leg with fat layer exposed ([address]    Relevant Orders    Initiate Outpatient Wound Care Protocol    Non-pressure chronic ulcer of lower leg, left, with fat layer exposed ([address]    Relevant Orders    Initiate Outpatient Wound Care Protocol    Venous stasis ulcer with edema of lower leg ([address] - Primary    Relevant Orders    Initiate Outpatient Wound Care Protocol          Pre Debridement Measurements:  Are located in the [place]  Documentation Flow Sheet  Post Debridement Measurements:  Wound/Ulcer Descriptions are Pre Debridement except measurements:     Incision 07/08/16 Abdomen Mid;Right;Upper; Outer (Active)   Number of days: 6369       Wound 03/26/21 Leg Right;Posterior #1 (Active)   Wound Image   06/04/21 1023   Dressing Status New dressing applied 05/19/21 1203   Wound Cleansed Cleansed with saline 05/19/21 1203   Dressing/Treatment Hydrofera blue;Dry dressing 05/19/21 1203   Offloading for Diabetic Foot Ulcers No offloading required 05/19/21 1203   Wound Length (cm) 0 cm 06/04/21 1023   Wound Width (cm) 0 cm 06/04/21 1023   Wound Depth (cm) 0 cm 06/04/21 1023   Wound Surface Area (cm^2) 0 cm^2 06/04/21 1023   Change in Wound Size % (l*w) 100 06/04/21 1023   Wound Volume (cm^3) 0 cm^3 06/04/21 1023   Wound Healing % 100 06/04/21 1023   Post-Procedure Length (cm) 0.2 cm 05/28/21 1005   Post-Procedure Width (cm) 0.2 cm 05/28/21 1005   Post-Procedure Depth (cm) 0.2 cm 05/28/21 1005   Post-Procedure Surface Area (cm^2) 0.04 cm^2 05/28/21 1005   Post-Procedure Volume (cm^3) 0.01 cm^3 05/28/21 1005   Wound Assessment Pink/red 05/28/21 0945   Drainage Amount Scant 05/28/21 0945   Drainage Description Yellow;Serosanguinous 05/28/21 0945   Odor None 05/28/21 0945   Ying-wound Assessment Intact 05/28/21 0945   Number of days: 70       Wound 03/26/21 Leg Left; Lower #2 blocked (Active)   Wound Image   05/19/21 1048   Dressing Status New dressing applied 05/19/21 1203   Wound Cleansed Cleansed with saline 05/19/21 1203   Dressing/Treatment Hydrofera blue;Dry dressing 05/19/21 1203   Offloading for Diabetic Foot Ulcers No offloading required 05/19/21 1203   Wound Length (cm) 6.3 cm 06/04/21 1023   Wound Width (cm) 14.5 cm 06/04/21 1023   Wound Depth (cm) 0.2 cm 06/04/21 1023   Wound Surface Area (cm^2) 91.35 cm^2 06/04/21 1023   Change in Wound Size % (l*w) 15.38 06/04/21 1023   Wound Volume (cm^3) 18.27 cm^3 06/04/21 1023   Wound Healing % 15 06/04/21 1023   Post-Procedure Length (cm) 6.4 cm 06/04/21 1043   Post-Procedure Width (cm) 14.5 cm 06/04/21 1043   Post-Procedure Depth (cm) 0.2 cm 06/04/21 1043   Post-Procedure Surface Area (cm^2) 92.8 cm^2 06/04/21 1043   Post-Procedure Volume (cm^3) 18.56 cm^3 06/04/21 1043   Wound Assessment Pink/red;Pale granulation tissue;Slough 06/04/21 1023   Drainage Amount Moderate 06/04/21 1023   Drainage Description Serosanguinous; Yellow 06/04/21 1023   Odor None 06/04/21 1023   Ying-wound Assessment Intact 06/04/21 1023   Number of days: 70          Procedure Note  Indications:  Based on my examination of this patient's wound(s)/ulcer(s) today, debridement is required to promote healing and evaluate the wound base. Performed by: Gal Nguyen DPM    Consent obtained:  Yes    Time out taken:  Yes    Pain Control: Anesthetic  Anesthetic: 4% Lidocaine Liquid Topical     Debridement:Excisional Debridement    Using #15 blade scalpel the wound(s)/ulcer(s) was/were sharply debrided down through and including the removal of subcutaneous tissue. Devitalized Tissue Debrided:  fibrin, biofilm, slough and necrotic/eschar to stimulate bleeding to promote healing, post debridement good bleeding base and wound edges noted    Wound/Ulcer #: 1 and 2    Percent of Wound/Ulcer Debrided: 100%    Total Surface Area Debrided:  90 sq cm     Estimated Blood Loss:  Minimal  Hemostasis Achieved:  by pressure    Procedural Pain:  3  / 10   Post Procedural Pain:  4 / 10     Response to treatment:  Well tolerated by patient. A culture was done. Plan:     Pt is not a smoker   - Discussed relationship of smoking and negative affects on wound healing   - Emphasized importance of tobacco avoidace/cessation   - Script for nicotine patch given to patient   - Information regarding support groups for smoking cessation given    In my professional opinion and based off the information that is available at this time this patient has appropriate indication for HBO Therapy: Maybe    Treatment Note please see attached Discharge Instructions    Written patient dismissal instructions given to patient and signed by patient or POA.          Discharge Instructions       Discharge Instructions      Visit Discharge/Physician Orders     Discharge condition: Stable     Assessment of pain at discharge:  none     Anesthetic used: 4% lidocaine solutiion     Discharge to: Home     Left via:Private automobile     Accompanied by: accompanied by grand daughter     ECF/HHA: [facility]      Dressing Orders: Clean bilateral lower leg ulcers with normal saline. Apply bhavana dry dressing and unnaboot. Change weekly at wound care center    pad left lateral  foot well with foam     Treatment Orders:         [facility] followup visit __________1 WEEK, DR PAREDES_______________  (Please note your next appointment above and if you are unable to keep, kindly give a 24 hour notice.  Thank you.)     Physician signature:__________________________        If you experience any of the following, please call the [address] [place] [address] [address] during business hours:     * Increase in Pain  * Temperature over 101  * Increase in drainage from your wound  * Drainage with a foul odor  * Bleeding  * Increase in swelling  * Need for compression bandage changes due to slippage, breakthrough drainage.     If you need medical attention outside of the business hours of the [address] [place] [address] Road please contact your PCP or go to the nearest emergency room.                                                                                      Electronically signed by Cindy Carroll DPM on 6/4/2021 at 10:51 AM

## 2021-06-11 ENCOUNTER — HOSPITAL ENCOUNTER (OUTPATIENT)
Dept: WOUND CARE | Age: 71
Discharge: HOME | End: 2021-06-11
Payer: MEDICARE

## 2021-06-11 VITALS
SYSTOLIC BLOOD PRESSURE: 128 MMHG | DIASTOLIC BLOOD PRESSURE: 78 MMHG | TEMPERATURE: 99.4 F | HEART RATE: 76 BPM | RESPIRATION RATE: 20 BRPM

## 2021-06-11 DIAGNOSIS — L97.812 NON-PRESSURE CHRONIC ULCER OF OTHER PART OF RIGHT LOWER LEG WITH FAT LAYER EXPOSED (HCC): ICD-10-CM

## 2021-06-11 DIAGNOSIS — I83.009 VENOUS STASIS ULCER WITH EDEMA OF LOWER LEG (HCC): Primary | ICD-10-CM

## 2021-06-11 DIAGNOSIS — R60.9 VENOUS STASIS ULCER WITH EDEMA OF LOWER LEG (HCC): Primary | ICD-10-CM

## 2021-06-11 DIAGNOSIS — I83.899 VENOUS STASIS ULCER WITH EDEMA OF LOWER LEG (HCC): Primary | ICD-10-CM

## 2021-06-11 DIAGNOSIS — L97.909 VENOUS STASIS ULCER WITH EDEMA OF LOWER LEG (HCC): Primary | ICD-10-CM

## 2021-06-11 DIAGNOSIS — L97.922 NON-PRESSURE CHRONIC ULCER OF LOWER LEG, LEFT, WITH FAT LAYER EXPOSED (HCC): ICD-10-CM

## 2021-06-11 PROCEDURE — 11045 DBRDMT SUBQ TISS EACH ADDL: CPT

## 2021-06-11 PROCEDURE — 11042 DBRDMT SUBQ TIS 1ST 20SQCM/<: CPT

## 2021-06-11 PROCEDURE — 6370000000 HC RX 637 (ALT 250 FOR IP): Performed by: PODIATRIST

## 2021-06-11 RX ORDER — BACITRACIN, NEOMYCIN, POLYMYXIN B 400; 3.5; 5 [USP'U]/G; MG/G; [USP'U]/G
OINTMENT TOPICAL ONCE
Status: CANCELLED | OUTPATIENT
Start: 2021-06-11 | End: 2021-06-11

## 2021-06-11 RX ORDER — LIDOCAINE 40 MG/G
CREAM TOPICAL ONCE
Status: CANCELLED | OUTPATIENT
Start: 2021-06-11 | End: 2021-06-11

## 2021-06-11 RX ORDER — LIDOCAINE HYDROCHLORIDE 40 MG/ML
SOLUTION TOPICAL ONCE
Status: CANCELLED | OUTPATIENT
Start: 2021-06-11 | End: 2021-06-11

## 2021-06-11 RX ORDER — BETAMETHASONE DIPROPIONATE 0.05 %
OINTMENT (GRAM) TOPICAL ONCE
Status: CANCELLED | OUTPATIENT
Start: 2021-06-11 | End: 2021-06-11

## 2021-06-11 RX ORDER — GINSENG 100 MG
CAPSULE ORAL ONCE
Status: CANCELLED | OUTPATIENT
Start: 2021-06-11 | End: 2021-06-11

## 2021-06-11 RX ORDER — LIDOCAINE HYDROCHLORIDE 20 MG/ML
JELLY TOPICAL ONCE
Status: CANCELLED | OUTPATIENT
Start: 2021-06-11 | End: 2021-06-11

## 2021-06-11 RX ORDER — LIDOCAINE HYDROCHLORIDE 40 MG/ML
SOLUTION TOPICAL ONCE
Status: COMPLETED | OUTPATIENT
Start: 2021-06-11 | End: 2021-06-11

## 2021-06-11 RX ORDER — CLOBETASOL PROPIONATE 0.5 MG/G
OINTMENT TOPICAL ONCE
Status: CANCELLED | OUTPATIENT
Start: 2021-06-11 | End: 2021-06-11

## 2021-06-11 RX ORDER — BACITRACIN ZINC AND POLYMYXIN B SULFATE 500; 1000 [USP'U]/G; [USP'U]/G
OINTMENT TOPICAL ONCE
Status: CANCELLED | OUTPATIENT
Start: 2021-06-11 | End: 2021-06-11

## 2021-06-11 RX ORDER — LIDOCAINE 50 MG/G
OINTMENT TOPICAL ONCE
Status: CANCELLED | OUTPATIENT
Start: 2021-06-11 | End: 2021-06-11

## 2021-06-11 RX ORDER — GENTAMICIN SULFATE 1 MG/G
OINTMENT TOPICAL ONCE
Status: CANCELLED | OUTPATIENT
Start: 2021-06-11 | End: 2021-06-11

## 2021-06-11 RX ADMIN — LIDOCAINE HYDROCHLORIDE: 40 SOLUTION TOPICAL at 10:37

## 2021-06-11 NOTE — PROGRESS NOTES
presence   []  3 Deep Ulcer with abcess formation and/or osteomyelitis   []  4 Localized gangrene   []  5 Extensive gangrene of the foot     Wound: N/A        PAST MEDICAL HISTORY      Diagnosis Date    Arthritis     Bleeding ulcer     Hypertension      Past Surgical History:   Procedure Laterality Date     SECTION      CHOLECYSTECTOMY, LAPAROSCOPIC  2016    ERCP  2016    HYSTERECTOMY      JOINT REPLACEMENT Left     left knee     History reviewed. No pertinent family history. Social History     Tobacco Use    Smoking status: Never Smoker    Smokeless tobacco: Never Used   Vaping Use    Vaping Use: Never used   Substance Use Topics    Alcohol use: No    Drug use: No     No Known Allergies  Current Outpatient Medications on File Prior to Encounter   Medication Sig Dispense Refill    traMADol (ULTRAM) 50 MG tablet Take 100 mg by mouth 2 times daily as needed for Pain.  furosemide (LASIX) 40 MG tablet Take 40 mg by mouth daily      potassium chloride (KLOR-CON) 20 MEQ packet Take 20 mEq by mouth daily      lisinopril-hydroCHLOROthiazide (PRINZIDE;ZESTORETIC) 20-25 MG per tablet Take 1 tablet by mouth daily      metoprolol tartrate (LOPRESSOR) 50 MG tablet Take 1 tablet by mouth 2 times daily 60 tablet 3    amLODIPine (NORVASC) 10 MG tablet Take 1 tablet by mouth daily (Patient taking differently: Take 5 mg by mouth daily ) 30 tablet 3    pantoprazole (PROTONIX) 40 MG tablet Take 1 tablet by mouth every morning (before breakfast) 30 tablet 0    vitamin B-12 (CYANOCOBALAMIN) 100 MCG tablet Take 50 mcg by mouth daily      calcium carbonate (OSCAL) 500 MG TABS tablet Take 1,000 mg by mouth daily      acetaminophen (TYLENOL) 500 MG tablet Take 500 mg by mouth every 6 hours as needed for Pain       No current facility-administered medications on file prior to encounter.        REVIEW OF SYSTEMS   ROS : All others Negative if blank [], Positive if [x]  General Vascular   [] Fevers [] Claudication   [] Chills [] Rest Pain   Skin Neurologic   [x] Tissue Loss [x] Lower extremity neuropathy     Objective:    /78   Pulse 76   Temp 99.4 °F (37.4 °C) (Temporal)   Resp 20   Wt Readings from Last 3 Encounters:   06/04/21 240 lb (108.9 kg)   05/12/21 240 lb (108.9 kg)   04/28/21 240 lb (108.9 kg)       PHYSICAL EXAM   CONSTITUTIONAL:   Awake, alert, cooperative   PSYCHIATRIC :  Oriented to time, place and person      normal insight to disease process  EXTREMITIES:   R LE Open wounds are noted   Skin color is abnormal with ulcers   Edema is  noted   Sensation intact to light touch   Palpation of the foot does cause pain   4/5 strength DF/PF  L LE Open wounds are noted   Skin color is abnormal with ulcers   Edema is  noted   Sensation intact to light touch   Palpation of the foot does cause pain   4/5 strength DF/PF  R dorsalis pedis 1 L dorsalis pedis 1   R posterior tibial 1 L posterior tibial 1     Assessment:     Problem List Items Addressed This Visit     Non-pressure chronic ulcer of other part of right lower leg with fat layer exposed ([address]    Relevant Orders    Initiate Outpatient Wound Care Protocol    Non-pressure chronic ulcer of lower leg, left, with fat layer exposed ([address]    Relevant Orders    Initiate Outpatient Wound Care Protocol    Venous stasis ulcer with edema of lower leg ([address] - Primary    Relevant Orders    Initiate Outpatient Wound Care Protocol          Pre Debridement Measurements:  Are located in the [place]  Documentation Flow Sheet  Post Debridement Measurements:  Wound/Ulcer Descriptions are Pre Debridement except measurements:     Incision 07/08/16 Abdomen Mid;Right;Upper; Outer (Active)   Number of days: 7528       Wound 03/26/21 Leg Right;Posterior #1 (Active)   Wound Image   06/04/21 1023   Dressing Status New dressing applied 06/04/21 1043   Wound Cleansed Not Cleansed 06/04/21 1043   Dressing/Treatment Zinc paste 06/04/21 1043   Offloading for Edematous 06/11/21 1035   Number of days: 77          Procedure Note  Indications:  Based on my examination of this patient's wound(s)/ulcer(s) today, debridement is required to promote healing and evaluate the wound base. Performed by: Katie Liz DPM    Consent obtained:  Yes    Time out taken:  Yes    Pain Control: Anesthetic  Anesthetic: 4% Lidocaine Liquid Topical     Debridement:Excisional Debridement    Using #15 blade scalpel the wound(s)/ulcer(s) was/were sharply debrided down through and including the removal of subcutaneous tissue. Devitalized Tissue Debrided:  fibrin, biofilm, slough and necrotic/eschar to stimulate bleeding to promote healing, post debridement good bleeding base and wound edges noted    Wound/Ulcer #: 1 and 2    Percent of Wound/Ulcer Debrided: 100%    Total Surface Area Debrided:  85 sq cm     Estimated Blood Loss:  Minimal  Hemostasis Achieved:  by pressure    Procedural Pain:  3  / 10   Post Procedural Pain:  4 / 10     Response to treatment:  Well tolerated by patient. A culture was done. Plan:     Pt is not a smoker   - Discussed relationship of smoking and negative affects on wound healing   - Emphasized importance of tobacco avoidace/cessation   - Script for nicotine patch given to patient   - Information regarding support groups for smoking cessation given    In my professional opinion and based off the information that is available at this time this patient has appropriate indication for HBO Therapy: Maybe    Treatment Note please see attached Discharge Instructions    Written patient dismissal instructions given to patient and signed by patient or POA.          Discharge Instructions       Discharge Instructions      Visit Discharge/Physician Orders     Discharge condition: Stable     Assessment of pain at discharge:  none     Anesthetic used: 4% lidocaine solutiion     Discharge to: Home     Left via:Private automobile     Accompanied by: accompanied by grand daughter     [facility]HHA: [facility]      Dressing Orders: Clean bilateral lower leg ulcers with normal saline. Apply bhavana dry dressing and unnaboot. Change weekly at wound care center    pad left lateral  foot well with foam     Treatment Orders:         [facility] followup visit __________1 WEEK, wednesday___________  (Please note your next appointment above and if you are unable to keep, kindly give a 24 hour notice.  Thank you.)     Physician signature:__________________________        If you experience any of the following, please call the [facility] during business hours:     * Increase in Pain  * Temperature over 101  * Increase in drainage from your wound  * Drainage with a foul odor  * Bleeding  * Increase in swelling  * Need for compression bandage changes due to slippage, breakthrough drainage.     If you need medical attention outside of the business hours of the [address] please contact your PCP or go to the nearest emergency room.                                                                                                           Electronically signed by Donzella Brittle, DPM on 6/11/2021 at 10:59 AM

## 2021-06-16 ENCOUNTER — HOSPITAL ENCOUNTER (OUTPATIENT)
Dept: WOUND CARE | Age: 71
Discharge: HOME | End: 2021-06-16
Payer: MEDICARE

## 2021-06-16 VITALS
RESPIRATION RATE: 20 BRPM | TEMPERATURE: 96.6 F | HEART RATE: 66 BPM | DIASTOLIC BLOOD PRESSURE: 80 MMHG | SYSTOLIC BLOOD PRESSURE: 132 MMHG

## 2021-06-16 DIAGNOSIS — L97.909 VENOUS STASIS ULCER WITH EDEMA OF LOWER LEG (HCC): Primary | ICD-10-CM

## 2021-06-16 DIAGNOSIS — L97.812 NON-PRESSURE CHRONIC ULCER OF OTHER PART OF RIGHT LOWER LEG WITH FAT LAYER EXPOSED (HCC): ICD-10-CM

## 2021-06-16 DIAGNOSIS — I83.899 VENOUS STASIS ULCER WITH EDEMA OF LOWER LEG (HCC): Primary | ICD-10-CM

## 2021-06-16 DIAGNOSIS — R60.9 VENOUS STASIS ULCER WITH EDEMA OF LOWER LEG (HCC): Primary | ICD-10-CM

## 2021-06-16 DIAGNOSIS — I83.009 VENOUS STASIS ULCER WITH EDEMA OF LOWER LEG (HCC): Primary | ICD-10-CM

## 2021-06-16 DIAGNOSIS — L97.922 NON-PRESSURE CHRONIC ULCER OF LOWER LEG, LEFT, WITH FAT LAYER EXPOSED (HCC): ICD-10-CM

## 2021-06-16 PROCEDURE — 6370000000 HC RX 637 (ALT 250 FOR IP): Performed by: PODIATRIST

## 2021-06-16 PROCEDURE — 11042 DBRDMT SUBQ TIS 1ST 20SQCM/<: CPT

## 2021-06-16 RX ORDER — GINSENG 100 MG
CAPSULE ORAL ONCE
Status: CANCELLED | OUTPATIENT
Start: 2021-06-16 | End: 2021-06-16

## 2021-06-16 RX ORDER — BETAMETHASONE DIPROPIONATE 0.05 %
OINTMENT (GRAM) TOPICAL ONCE
Status: CANCELLED | OUTPATIENT
Start: 2021-06-16 | End: 2021-06-16

## 2021-06-16 RX ORDER — LIDOCAINE 40 MG/G
CREAM TOPICAL ONCE
Status: CANCELLED | OUTPATIENT
Start: 2021-06-16 | End: 2021-06-16

## 2021-06-16 RX ORDER — GENTAMICIN SULFATE 1 MG/G
OINTMENT TOPICAL ONCE
Status: CANCELLED | OUTPATIENT
Start: 2021-06-16 | End: 2021-06-16

## 2021-06-16 RX ORDER — BACITRACIN, NEOMYCIN, POLYMYXIN B 400; 3.5; 5 [USP'U]/G; MG/G; [USP'U]/G
OINTMENT TOPICAL ONCE
Status: CANCELLED | OUTPATIENT
Start: 2021-06-16 | End: 2021-06-16

## 2021-06-16 RX ORDER — LIDOCAINE 50 MG/G
OINTMENT TOPICAL ONCE
Status: CANCELLED | OUTPATIENT
Start: 2021-06-16 | End: 2021-06-16

## 2021-06-16 RX ORDER — LIDOCAINE HYDROCHLORIDE 20 MG/ML
JELLY TOPICAL ONCE
Status: CANCELLED | OUTPATIENT
Start: 2021-06-16 | End: 2021-06-16

## 2021-06-16 RX ORDER — LIDOCAINE HYDROCHLORIDE 40 MG/ML
SOLUTION TOPICAL ONCE
Status: CANCELLED | OUTPATIENT
Start: 2021-06-16 | End: 2021-06-16

## 2021-06-16 RX ORDER — BACITRACIN ZINC AND POLYMYXIN B SULFATE 500; 1000 [USP'U]/G; [USP'U]/G
OINTMENT TOPICAL ONCE
Status: CANCELLED | OUTPATIENT
Start: 2021-06-16 | End: 2021-06-16

## 2021-06-16 RX ORDER — LIDOCAINE HYDROCHLORIDE 40 MG/ML
SOLUTION TOPICAL ONCE
Status: COMPLETED | OUTPATIENT
Start: 2021-06-16 | End: 2021-06-16

## 2021-06-16 RX ORDER — CLOBETASOL PROPIONATE 0.5 MG/G
OINTMENT TOPICAL ONCE
Status: CANCELLED | OUTPATIENT
Start: 2021-06-16 | End: 2021-06-16

## 2021-06-16 RX ADMIN — LIDOCAINE HYDROCHLORIDE 5 ML: 40 SOLUTION TOPICAL at 10:02

## 2021-06-16 NOTE — PLAN OF CARE
Problem: Wound:  Goal: Will show signs of wound healing; wound closure and no evidence of infection  Description: Will show signs of wound healing; wound closure and no evidence of infection  Outcome: Met This Shift     Problem: Compression therapy:  Goal: Will be free from complications associated with compression therapy  Description: Will be free from complications associated with compression therapy  Outcome: Met This Shift     Problem: Falls - Risk of:  Goal: Will remain free from falls  Description: Will remain free from falls  Outcome: Met This Shift

## 2021-06-16 NOTE — PROGRESS NOTES
fascia  No bone involvement or abscess presence   []  3 Deep Ulcer with abcess formation and/or osteomyelitis   []  4 Localized gangrene   []  5 Extensive gangrene of the foot     Wound: N/A        PAST MEDICAL HISTORY      Diagnosis Date    Arthritis     Bleeding ulcer     Hypertension      Past Surgical History:   Procedure Laterality Date     SECTION      CHOLECYSTECTOMY, LAPAROSCOPIC  2016    ERCP  2016    HYSTERECTOMY      JOINT REPLACEMENT Left     left knee     History reviewed. No pertinent family history. Social History     Tobacco Use    Smoking status: Never Smoker    Smokeless tobacco: Never Used   Vaping Use    Vaping Use: Never used   Substance Use Topics    Alcohol use: No    Drug use: No     No Known Allergies  Current Outpatient Medications on File Prior to Encounter   Medication Sig Dispense Refill    traMADol (ULTRAM) 50 MG tablet Take 100 mg by mouth 2 times daily as needed for Pain.  furosemide (LASIX) 40 MG tablet Take 40 mg by mouth daily      potassium chloride (KLOR-CON) 20 MEQ packet Take 20 mEq by mouth daily      lisinopril-hydroCHLOROthiazide (PRINZIDE;ZESTORETIC) 20-25 MG per tablet Take 1 tablet by mouth daily      metoprolol tartrate (LOPRESSOR) 50 MG tablet Take 1 tablet by mouth 2 times daily 60 tablet 3    amLODIPine (NORVASC) 10 MG tablet Take 1 tablet by mouth daily (Patient taking differently: Take 5 mg by mouth daily ) 30 tablet 3    pantoprazole (PROTONIX) 40 MG tablet Take 1 tablet by mouth every morning (before breakfast) 30 tablet 0    vitamin B-12 (CYANOCOBALAMIN) 100 MCG tablet Take 50 mcg by mouth daily      calcium carbonate (OSCAL) 500 MG TABS tablet Take 1,000 mg by mouth daily      acetaminophen (TYLENOL) 500 MG tablet Take 500 mg by mouth every 6 hours as needed for Pain       No current facility-administered medications on file prior to encounter.        REVIEW OF SYSTEMS   ROS : All others Negative if blank [], Positive if [x]  General Vascular   [] Fevers [] Claudication   [] Chills [] Rest Pain   Skin Neurologic   [x] Tissue Loss [x] Lower extremity neuropathy     Objective:    /80   Pulse 66   Temp 96.6 °F (35.9 °C) (Temporal)   Resp 20   Wt Readings from Last 3 Encounters:   06/04/21 240 lb (108.9 kg)   05/12/21 240 lb (108.9 kg)   04/28/21 240 lb (108.9 kg)       PHYSICAL EXAM   CONSTITUTIONAL:   Awake, alert, cooperative   PSYCHIATRIC :  Oriented to time, place and person      normal insight to disease process  EXTREMITIES:   R LE Open wounds are noted   Skin color is abnormal with ulcers   Edema is  noted   Sensation intact to light touch   Palpation of the foot does cause pain   4/5 strength DF/PF  L LE Open wounds are noted   Skin color is abnormal with ulcers   Edema is  noted   Sensation intact to light touch   Palpation of the foot does cause pain   4/5 strength DF/PF  R dorsalis pedis 1 L dorsalis pedis 1   R posterior tibial 1 L posterior tibial 1     Assessment:     Problem List Items Addressed This Visit     Non-pressure chronic ulcer of other part of right lower leg with fat layer exposed ([address]    Relevant Orders    Initiate Outpatient Wound Care Protocol    Non-pressure chronic ulcer of lower leg, left, with fat layer exposed ([address]    Relevant Orders    Initiate Outpatient Wound Care Protocol    Venous stasis ulcer with edema of lower leg ([address] - Primary    Relevant Orders    Initiate Outpatient Wound Care Protocol          Pre Debridement Measurements:  Are located in the [place]  Documentation Flow Sheet  Post Debridement Measurements:  Wound/Ulcer Descriptions are Pre Debridement except measurements:     Incision 07/08/16 Abdomen Mid;Right;Upper; Outer (Active)   Number of days: 1803       Wound 03/26/21 Leg Right;Posterior #1 (Active)   Wound Image   06/04/21 1023   Dressing Status New dressing applied 06/04/21 1043   Wound Cleansed Not Cleansed 06/04/21 1043   Dressing/Treatment Zinc paste;ABD 06/11/21 1046   Offloading for Diabetic Foot Ulcers No offloading required 05/19/21 1203   Wound Length (cm) 0 cm 06/04/21 1023   Wound Width (cm) 0 cm 06/04/21 1023   Wound Depth (cm) 0 cm 06/04/21 1023   Wound Surface Area (cm^2) 0 cm^2 06/04/21 1023   Change in Wound Size % (l*w) 100 06/04/21 1023   Wound Volume (cm^3) 0 cm^3 06/04/21 1023   Wound Healing % 100 06/04/21 1023   Post-Procedure Length (cm) 0.2 cm 05/28/21 1005   Post-Procedure Width (cm) 0.2 cm 05/28/21 1005   Post-Procedure Depth (cm) 0.2 cm 05/28/21 1005   Post-Procedure Surface Area (cm^2) 0.04 cm^2 05/28/21 1005   Post-Procedure Volume (cm^3) 0.01 cm^3 05/28/21 1005   Wound Assessment Pink/red 05/28/21 0945   Drainage Amount Scant 05/28/21 0945   Drainage Description Yellow;Serosanguinous 05/28/21 0945   Odor None 05/28/21 0945   Ying-wound Assessment Intact 05/28/21 0945   Number of days: 82       Wound 03/26/21 Leg Left; Lower #2 blocked (Active)   Wound Image   05/19/21 1048   Dressing Status New dressing applied;New drainage noted 06/11/21 1046   Wound Cleansed Cleansed with saline 06/11/21 1046   Dressing/Treatment ABD;Collagen with Ag;Zinc paste 06/11/21 1046   Offloading for Diabetic Foot Ulcers No offloading required 05/19/21 1203   Wound Length (cm) 5.5 cm 06/16/21 0956   Wound Width (cm) 2.5 cm 06/16/21 0956   Wound Depth (cm) 0.1 cm 06/16/21 0956   Wound Surface Area (cm^2) 13.75 cm^2 06/16/21 0956   Change in Wound Size % (l*w) 87.26 06/16/21 0956   Wound Volume (cm^3) 1.38 cm^3 06/16/21 0956   Wound Healing % 94 06/16/21 0956   Post-Procedure Length (cm) 5.6 cm 06/16/21 1015   Post-Procedure Width (cm) 2.6 cm 06/16/21 1015   Post-Procedure Depth (cm) 0.2 cm 06/16/21 1015   Post-Procedure Surface Area (cm^2) 14.56 cm^2 06/16/21 1015   Post-Procedure Volume (cm^3) 2.91 cm^3 06/16/21 1015   Wound Assessment Fibrin;Pink/red 06/16/21 0956   Drainage Amount Moderate 06/16/21 0956   Drainage Description Serosanguinous 06/16/21 Home     Left via:Private automobile     Accompanied by: accompanied by grand daughter     ECF/HHA: [facility]      Dressing Orders: Clean bilateral lower leg ulcers with normal saline. Apply bhavana dry dressing and unnaboot. Change weekly at wound care center    pad left lateral  foot well with foam     Treatment Orders:         [address] followup visit __________1 WEEK, Friday___________  (Please note your next appointment above and if you are unable to keep, kindly give a 24 hour notice.  Thank you.)     Physician signature:__________________________        If you experience any of the following, please call the [address] during business hours:     * Increase in Pain  * Temperature over 101  * Increase in drainage from your wound  * Drainage with a foul odor  * Bleeding  * Increase in swelling  * Need for compression bandage changes due to slippage, breakthrough drainage.     If you need medical attention outside of the business hours of the [address] please contact your PCP or go to the nearest emergency room.                                                                       Electronically signed by Lance Gonzalez DPM on 6/16/2021 at 10:18 AM

## 2021-06-25 ENCOUNTER — HOSPITAL ENCOUNTER (OUTPATIENT)
Dept: WOUND CARE | Age: 71
Discharge: HOME | End: 2021-06-25
Payer: MEDICARE

## 2021-06-25 VITALS
TEMPERATURE: 97.6 F | DIASTOLIC BLOOD PRESSURE: 70 MMHG | SYSTOLIC BLOOD PRESSURE: 124 MMHG | HEART RATE: 71 BPM | RESPIRATION RATE: 21 BRPM

## 2021-06-25 DIAGNOSIS — I83.899 VENOUS STASIS ULCER WITH EDEMA OF LOWER LEG (HCC): Primary | ICD-10-CM

## 2021-06-25 DIAGNOSIS — L97.909 VENOUS STASIS ULCER WITH EDEMA OF LOWER LEG (HCC): Primary | ICD-10-CM

## 2021-06-25 DIAGNOSIS — R60.9 VENOUS STASIS ULCER WITH EDEMA OF LOWER LEG (HCC): Primary | ICD-10-CM

## 2021-06-25 DIAGNOSIS — I83.009 VENOUS STASIS ULCER WITH EDEMA OF LOWER LEG (HCC): Primary | ICD-10-CM

## 2021-06-25 DIAGNOSIS — L97.922 NON-PRESSURE CHRONIC ULCER OF LOWER LEG, LEFT, WITH FAT LAYER EXPOSED (HCC): ICD-10-CM

## 2021-06-25 DIAGNOSIS — L97.812 NON-PRESSURE CHRONIC ULCER OF OTHER PART OF RIGHT LOWER LEG WITH FAT LAYER EXPOSED (HCC): ICD-10-CM

## 2021-06-25 PROCEDURE — 11042 DBRDMT SUBQ TIS 1ST 20SQCM/<: CPT

## 2021-06-25 PROCEDURE — 6370000000 HC RX 637 (ALT 250 FOR IP): Performed by: PODIATRIST

## 2021-06-25 RX ORDER — LIDOCAINE HYDROCHLORIDE 40 MG/ML
SOLUTION TOPICAL ONCE
Status: COMPLETED | OUTPATIENT
Start: 2021-06-25 | End: 2021-06-25

## 2021-06-25 RX ORDER — LIDOCAINE 50 MG/G
OINTMENT TOPICAL ONCE
Status: CANCELLED | OUTPATIENT
Start: 2021-06-25 | End: 2021-06-25

## 2021-06-25 RX ORDER — GENTAMICIN SULFATE 1 MG/G
OINTMENT TOPICAL ONCE
Status: CANCELLED | OUTPATIENT
Start: 2021-06-25 | End: 2021-06-25

## 2021-06-25 RX ORDER — BACITRACIN, NEOMYCIN, POLYMYXIN B 400; 3.5; 5 [USP'U]/G; MG/G; [USP'U]/G
OINTMENT TOPICAL ONCE
Status: CANCELLED | OUTPATIENT
Start: 2021-06-25 | End: 2021-06-25

## 2021-06-25 RX ORDER — LIDOCAINE 40 MG/G
CREAM TOPICAL ONCE
Status: CANCELLED | OUTPATIENT
Start: 2021-06-25 | End: 2021-06-25

## 2021-06-25 RX ORDER — LIDOCAINE HYDROCHLORIDE 20 MG/ML
JELLY TOPICAL ONCE
Status: CANCELLED | OUTPATIENT
Start: 2021-06-25 | End: 2021-06-25

## 2021-06-25 RX ORDER — BETAMETHASONE DIPROPIONATE 0.05 %
OINTMENT (GRAM) TOPICAL ONCE
Status: CANCELLED | OUTPATIENT
Start: 2021-06-25 | End: 2021-06-25

## 2021-06-25 RX ORDER — LIDOCAINE HYDROCHLORIDE 40 MG/ML
SOLUTION TOPICAL ONCE
Status: CANCELLED | OUTPATIENT
Start: 2021-06-25 | End: 2021-06-25

## 2021-06-25 RX ORDER — GINSENG 100 MG
CAPSULE ORAL ONCE
Status: CANCELLED | OUTPATIENT
Start: 2021-06-25 | End: 2021-06-25

## 2021-06-25 RX ORDER — CLOBETASOL PROPIONATE 0.5 MG/G
OINTMENT TOPICAL ONCE
Status: CANCELLED | OUTPATIENT
Start: 2021-06-25 | End: 2021-06-25

## 2021-06-25 RX ORDER — BACITRACIN ZINC AND POLYMYXIN B SULFATE 500; 1000 [USP'U]/G; [USP'U]/G
OINTMENT TOPICAL ONCE
Status: CANCELLED | OUTPATIENT
Start: 2021-06-25 | End: 2021-06-25

## 2021-06-25 RX ADMIN — LIDOCAINE HYDROCHLORIDE: 40 SOLUTION TOPICAL at 10:46

## 2021-06-25 NOTE — PROGRESS NOTES
Wound Healing Center  History and Physical/Consultation  Podiatry    Referring Physician : Ja Cifuentes DO  Julee Fisher  MEDICAL RECORD NUMBER:  [id number]  AGE: 66 y.o. GENDER: female  : 1950  EPISODE DATE:  2021  Subjective:     Chief Complaint   Patient presents with    Wound Check     BLE         HISTORY of PRESENT ILLNESS HANH Fisher is a 66 y.o. female who presents today for wound/ulcer evaluation. History of Wound Context:  The patient has had a wound of b/l legs venous which was first noted approximately months ago. This has been treated unna boots. On their initial visit to the wound healing center, 3/26/20 ,  the patient has noted that the wound has not been improving. The patient has had similar previous wounds in the past.      Pt is not on abx at time of initial visit.       Wound/Ulcer Pain Timing/Severity: constant  Quality of pain: aching  Severity:  4 / 10   Modifying Factors: Pain worsens with walking  Associated Signs/Symptoms: edema, erythema, drainage and numbness    Ulcer Identification:  Ulcer Type: venous  Contributing Factors: edema, venous stasis and lymphedema    Diabetic/Pressure/Non Pressure Ulcers onl y:  Ulcer: Non-Pressure ulcer, fat layer exposed    4-16-  Debrided, cont tx and care, profore lyte with bhavana  If patient has diabetic lower extremity wounds    4--21  Debrided, cont tx and care     -21  Debrided, healing well    5--21  Debrided, cont tx and care     5--21  Debrided, cont tx and care     5-21  Debrided, cont tx and care    -21  Debrided, cont tx and care    6--21  Debrided, cont tx and care     6--21  Debrided, cont and care    6-16-21  Debrided, cont tx and care    -21  DEBRIDED, HEALING WELL  Nunez Classification of diabetic lower extremity wounds:    Grade Description   []  0 No open wound   []  1 Superficial ulcer involving the full skin thickness   []  2 Deep ulcer involves ligament, tendon, joint blank [], Positive if [x]  General Vascular   [] Fevers [] Claudication   [] Chills [] Rest Pain   Skin Neurologic   [x] Tissue Loss [x] Lower extremity neuropathy     Objective:    /70   Pulse 71   Temp 97.6 °F (36.4 °C) (Temporal)   Resp 21   Wt Readings from Last 3 Encounters:   06/04/21 240 lb (108.9 kg)   05/12/21 240 lb (108.9 kg)   04/28/21 240 lb (108.9 kg)       PHYSICAL EXAM   CONSTITUTIONAL:   Awake, alert, cooperative   PSYCHIATRIC :  Oriented to time, place and person      normal insight to disease process  EXTREMITIES:   R LE Open wounds are noted   Skin color is abnormal with ulcers   Edema is  noted   Sensation intact to light touch   Palpation of the foot does cause pain   4/5 strength DF/PF  L LE Open wounds are noted   Skin color is abnormal with ulcers   Edema is  noted   Sensation intact to light touch   Palpation of the foot does cause pain   4/5 strength DF/PF  R dorsalis pedis 1 L dorsalis pedis 1   R posterior tibial 1 L posterior tibial 1     Assessment:     Problem List Items Addressed This Visit     Non-pressure chronic ulcer of other part of right lower leg with fat layer exposed ([address]    Relevant Orders    Initiate Outpatient Wound Care Protocol    Non-pressure chronic ulcer of lower leg, left, with fat layer exposed ([address]    Relevant Orders    Initiate Outpatient Wound Care Protocol    Venous stasis ulcer with edema of lower leg ([address] - Primary    Relevant Orders    Initiate Outpatient Wound Care Protocol          Pre Debridement Measurements:  Are located in the [facility]  Documentation Flow Sheet  Post Debridement Measurements:  Wound/Ulcer Descriptions are Pre Debridement except measurements:     Incision 07/08/16 Abdomen Mid;Right;Upper; Outer (Active)   Number of days: 9242       Wound 03/26/21 Leg Left; Lower #2 blocked (Active)   Wound Image   05/19/21 1048   Dressing Status New dressing applied 06/25/21 1111   Wound Cleansed Cleansed with saline 06/25/21 1111 Dressing/Treatment Collagen with Ag;ABD;Zinc paste 06/25/21 1111   Offloading for Diabetic Foot Ulcers No offloading required 05/19/21 1203   Wound Length (cm) 5.4 cm 06/25/21 1037   Wound Width (cm) 2.8 cm 06/25/21 1037   Wound Depth (cm) 0.2 cm 06/25/21 1037   Wound Surface Area (cm^2) 15.12 cm^2 06/25/21 1037   Change in Wound Size % (l*w) 85.99 06/25/21 1037   Wound Volume (cm^3) 3.02 cm^3 06/25/21 1037   Wound Healing % 86 06/25/21 1037   Post-Procedure Length (cm) 5.4 cm 06/25/21 1057   Post-Procedure Width (cm) 2.8 cm 06/25/21 1057   Post-Procedure Depth (cm) 0.3 cm 06/25/21 1057   Post-Procedure Surface Area (cm^2) 15.12 cm^2 06/25/21 1057   Post-Procedure Volume (cm^3) 4.54 cm^3 06/25/21 1057   Wound Assessment Fibrin;Pink/red 06/25/21 1037   Drainage Amount Moderate 06/25/21 1037   Drainage Description Serosanguinous 06/25/21 1037   Odor None 06/25/21 1037   Ying-wound Assessment Intact 06/25/21 1037   Number of days: 91          Procedure Note  Indications:  Based on my examination of this patient's wound(s)/ulcer(s) today, debridement is required to promote healing and evaluate the wound base. Performed by: Izzy Connell DPM    Consent obtained:  Yes    Time out taken:  Yes    Pain Control: Anesthetic  Anesthetic: 4% Lidocaine Liquid Topical     Debridement:Excisional Debridement    Using #15 blade scalpel the wound(s)/ulcer(s) was/were sharply debrided down through and including the removal of subcutaneous tissue. Devitalized Tissue Debrided:  fibrin, biofilm, slough and necrotic/eschar to stimulate bleeding to promote healing, post debridement good bleeding base and wound edges noted    Wound/Ulcer #: 1 and 2    Percent of Wound/Ulcer Debrided: 100%    Total Surface Area Debrided:  14 sq cm     Estimated Blood Loss:  Minimal  Hemostasis Achieved:  by pressure    Procedural Pain:  3  / 10   Post Procedural Pain:  4 / 10     Response to treatment:  Well tolerated by patient.      A                      Electronically signed by Nani Hancock DPM on 6/25/2021 at 11:16 AM

## 2021-07-02 ENCOUNTER — HOSPITAL ENCOUNTER (OUTPATIENT)
Dept: WOUND CARE | Age: 71
Discharge: HOME | End: 2021-07-02
Payer: MEDICARE

## 2021-07-02 VITALS
DIASTOLIC BLOOD PRESSURE: 70 MMHG | RESPIRATION RATE: 20 BRPM | BODY MASS INDEX: 40.97 KG/M2 | HEIGHT: 64 IN | SYSTOLIC BLOOD PRESSURE: 118 MMHG | WEIGHT: 240 LBS | HEART RATE: 78 BPM | TEMPERATURE: 96.2 F

## 2021-07-02 DIAGNOSIS — L97.909 VENOUS STASIS ULCER WITH EDEMA OF LOWER LEG (HCC): Primary | ICD-10-CM

## 2021-07-02 DIAGNOSIS — L97.812 NON-PRESSURE CHRONIC ULCER OF OTHER PART OF RIGHT LOWER LEG WITH FAT LAYER EXPOSED (HCC): ICD-10-CM

## 2021-07-02 DIAGNOSIS — I83.009 VENOUS STASIS ULCER WITH EDEMA OF LOWER LEG (HCC): Primary | ICD-10-CM

## 2021-07-02 DIAGNOSIS — R60.9 VENOUS STASIS ULCER WITH EDEMA OF LOWER LEG (HCC): Primary | ICD-10-CM

## 2021-07-02 DIAGNOSIS — I83.899 VENOUS STASIS ULCER WITH EDEMA OF LOWER LEG (HCC): Primary | ICD-10-CM

## 2021-07-02 DIAGNOSIS — L97.922 NON-PRESSURE CHRONIC ULCER OF LOWER LEG, LEFT, WITH FAT LAYER EXPOSED (HCC): ICD-10-CM

## 2021-07-02 PROCEDURE — 11042 DBRDMT SUBQ TIS 1ST 20SQCM/<: CPT

## 2021-07-02 RX ORDER — CLOBETASOL PROPIONATE 0.5 MG/G
OINTMENT TOPICAL ONCE
Status: CANCELLED | OUTPATIENT
Start: 2021-07-02 | End: 2021-07-02

## 2021-07-02 RX ORDER — LIDOCAINE 40 MG/G
CREAM TOPICAL ONCE
Status: CANCELLED | OUTPATIENT
Start: 2021-07-02 | End: 2021-07-02

## 2021-07-02 RX ORDER — GENTAMICIN SULFATE 1 MG/G
OINTMENT TOPICAL ONCE
Status: CANCELLED | OUTPATIENT
Start: 2021-07-02 | End: 2021-07-02

## 2021-07-02 RX ORDER — LIDOCAINE 50 MG/G
OINTMENT TOPICAL ONCE
Status: CANCELLED | OUTPATIENT
Start: 2021-07-02 | End: 2021-07-02

## 2021-07-02 RX ORDER — GINSENG 100 MG
CAPSULE ORAL ONCE
Status: CANCELLED | OUTPATIENT
Start: 2021-07-02 | End: 2021-07-02

## 2021-07-02 RX ORDER — LIDOCAINE HYDROCHLORIDE 40 MG/ML
SOLUTION TOPICAL ONCE
Status: CANCELLED | OUTPATIENT
Start: 2021-07-02 | End: 2021-07-02

## 2021-07-02 RX ORDER — BACITRACIN ZINC AND POLYMYXIN B SULFATE 500; 1000 [USP'U]/G; [USP'U]/G
OINTMENT TOPICAL ONCE
Status: CANCELLED | OUTPATIENT
Start: 2021-07-02 | End: 2021-07-02

## 2021-07-02 RX ORDER — LIDOCAINE HYDROCHLORIDE 20 MG/ML
JELLY TOPICAL ONCE
Status: CANCELLED | OUTPATIENT
Start: 2021-07-02 | End: 2021-07-02

## 2021-07-02 RX ORDER — LIDOCAINE HYDROCHLORIDE 40 MG/ML
SOLUTION TOPICAL ONCE
Status: DISCONTINUED | OUTPATIENT
Start: 2021-07-02 | End: 2021-07-03 | Stop reason: HOSPADM

## 2021-07-02 RX ORDER — BETAMETHASONE DIPROPIONATE 0.05 %
OINTMENT (GRAM) TOPICAL ONCE
Status: CANCELLED | OUTPATIENT
Start: 2021-07-02 | End: 2021-07-02

## 2021-07-02 RX ORDER — BACITRACIN, NEOMYCIN, POLYMYXIN B 400; 3.5; 5 [USP'U]/G; MG/G; [USP'U]/G
OINTMENT TOPICAL ONCE
Status: CANCELLED | OUTPATIENT
Start: 2021-07-02 | End: 2021-07-02

## 2021-07-02 ASSESSMENT — PAIN SCALES - GENERAL: PAINLEVEL_OUTOF10: 0

## 2021-07-02 NOTE — PROGRESS NOTES
Wound Healing Center  History and Physical/Consultation  Podiatry    Referring Physician : Katelynn Saba DO  Byron Anthony  MEDICAL RECORD NUMBER:  [id number]  AGE: 66 y.o. GENDER: female  : 1950  EPISODE DATE:  2021  Subjective:     Chief Complaint   Patient presents with    Wound Check     bilateral legs         HISTORY of PRESENT ILLNESS HPI     Byron Anthony is a 66 y.o. female who presents today for wound/ulcer evaluation. History of Wound Context:  The patient has had a wound of b/l legs venous which was first noted approximately months ago. This has been treated unna boots. On their initial visit to the wound healing center, 3/26/20 ,  the patient has noted that the wound has not been improving. The patient has had similar previous wounds in the past.      Pt is not on abx at time of initial visit.       Wound/Ulcer Pain Timing/Severity: constant  Quality of pain: aching  Severity:  4 / 10   Modifying Factors: Pain worsens with walking  Associated Signs/Symptoms: edema, erythema, drainage and numbness    Ulcer Identification:  Ulcer Type: venous  Contributing Factors: edema, venous stasis and lymphedema    Diabetic/Pressure/Non Pressure Ulcers onl y:  Ulcer: Non-Pressure ulcer, fat layer exposed    4-16-21  Debrided, cont tx and care, profore lyte with bhavana  If patient has diabetic lower extremity wounds    4--21  Debrided, cont tx and care     428-21  Debrided, healing well    5-7-21  Debrided, cont tx and care     5-12-21  Debrided, cont tx and care     5-21  Debrided, cont tx and care    528-21  Debrided, cont tx and care    6-4-21  Debrided, cont tx and care     6--21  Debrided, cont and care    6-16-21  Debrided, cont tx and care    625-21  DEBRIDED, HEALING WELL    7-2-21  Debrided  Nunez Classification of diabetic lower extremity wounds:    Grade Description   []  0 No open wound   []  1 Superficial ulcer involving the full skin thickness   []  2 Deep ulcer involves ligament, tendon, joint capsule, or fascia  No bone involvement or abscess presence   []  3 Deep Ulcer with abcess formation and/or osteomyelitis   []  4 Localized gangrene   []  5 Extensive gangrene of the foot     Wound: N/A        PAST MEDICAL HISTORY      Diagnosis Date    Arthritis     Bleeding ulcer     Hypertension      Past Surgical History:   Procedure Laterality Date     SECTION      CHOLECYSTECTOMY, LAPAROSCOPIC  2016    ERCP  2016    HYSTERECTOMY      JOINT REPLACEMENT Left     left knee     History reviewed. No pertinent family history. Social History     Tobacco Use    Smoking status: Never Smoker    Smokeless tobacco: Never Used   Vaping Use    Vaping Use: Never used   Substance Use Topics    Alcohol use: No    Drug use: No     No Known Allergies  Current Outpatient Medications on File Prior to Encounter   Medication Sig Dispense Refill    traMADol (ULTRAM) 50 MG tablet Take 100 mg by mouth 2 times daily as needed for Pain.  furosemide (LASIX) 40 MG tablet Take 40 mg by mouth daily      potassium chloride (KLOR-CON) 20 MEQ packet Take 20 mEq by mouth daily      lisinopril-hydroCHLOROthiazide (PRINZIDE;ZESTORETIC) 20-25 MG per tablet Take 1 tablet by mouth daily      acetaminophen (TYLENOL) 500 MG tablet Take 500 mg by mouth every 6 hours as needed for Pain      metoprolol tartrate (LOPRESSOR) 50 MG tablet Take 1 tablet by mouth 2 times daily 60 tablet 3    amLODIPine (NORVASC) 10 MG tablet Take 1 tablet by mouth daily (Patient taking differently: Take 5 mg by mouth daily ) 30 tablet 3    pantoprazole (PROTONIX) 40 MG tablet Take 1 tablet by mouth every morning (before breakfast) 30 tablet 0    vitamin B-12 (CYANOCOBALAMIN) 100 MCG tablet Take 50 mcg by mouth daily      calcium carbonate (OSCAL) 500 MG TABS tablet Take 1,000 mg by mouth daily       No current facility-administered medications on file prior to encounter.        REVIEW OF SYSTEMS ROS : All others Negative if blank [], Positive if [x]  General Vascular   [] Fevers [] Claudication   [] Chills [] Rest Pain   Skin Neurologic   [x] Tissue Loss [x] Lower extremity neuropathy     Objective:    /70   Pulse 78   Temp 96.2 °F (35.7 °C) (Temporal)   Resp 20   Ht 5' 4\" (1.626 m)   Wt 240 lb (108.9 kg)   BMI 41.20 kg/m²   Wt Readings from Last 3 Encounters:   07/02/21 240 lb (108.9 kg)   06/04/21 240 lb (108.9 kg)   05/12/21 240 lb (108.9 kg)       PHYSICAL EXAM   CONSTITUTIONAL:   Awake, alert, cooperative   PSYCHIATRIC :  Oriented to time, place and person      normal insight to disease process  EXTREMITIES:   R LE Open wounds are noted   Skin color is abnormal with ulcers   Edema is  noted   Sensation intact to light touch   Palpation of the foot does cause pain   4/5 strength DF/PF  L LE Open wounds are noted   Skin color is abnormal with ulcers   Edema is  noted   Sensation intact to light touch   Palpation of the foot does cause pain   4/5 strength DF/PF  R dorsalis pedis 1 L dorsalis pedis 1   R posterior tibial 1 L posterior tibial 1     Assessment:     Problem List Items Addressed This Visit     Non-pressure chronic ulcer of other part of right lower leg with fat layer exposed ([address]    Relevant Medications    lidocaine (XYLOCAINE) 4 % external solution (Start on 7/2/2021  9:00 AM)    Other Relevant Orders    Initiate Outpatient Wound Care Protocol    Non-pressure chronic ulcer of lower leg, left, with fat layer exposed ([address]    Relevant Medications    lidocaine (XYLOCAINE) 4 % external solution (Start on 7/2/2021  9:00 AM)    Other Relevant Orders    Initiate Outpatient Wound Care Protocol    Venous stasis ulcer with edema of lower leg ([facility]) - Primary    Relevant Medications    lidocaine (XYLOCAINE) 4 % external solution (Start on 7/2/2021  9:00 AM)    Other Relevant Orders    Initiate Outpatient Wound Care Protocol          Pre Debridement Measurements:  Are located in the Wound/Ulcer Documentation Flow Sheet  Post Debridement Measurements:  Wound/Ulcer Descriptions are Pre Debridement except measurements:     Incision 07/08/16 Abdomen Mid;Right;Upper; Outer (Active)   Number of days: 1819       Wound 03/26/21 Leg Left; Lower #2 blocked (Active)   Wound Image   07/02/21 0830   Dressing Status New dressing applied 06/25/21 1111   Wound Cleansed Cleansed with saline 06/25/21 1111   Dressing/Treatment Collagen with Ag;ABD;Zinc paste 06/25/21 1111   Offloading for Diabetic Foot Ulcers No offloading required 05/19/21 1203   Wound Length (cm) 3.9 cm 07/02/21 0830   Wound Width (cm) 2.5 cm 07/02/21 0830   Wound Depth (cm) 0.2 cm 07/02/21 0830   Wound Surface Area (cm^2) 9.75 cm^2 07/02/21 0830   Change in Wound Size % (l*w) 90.97 07/02/21 0830   Wound Volume (cm^3) 1.95 cm^3 07/02/21 0830   Wound Healing % 91 07/02/21 0830   Post-Procedure Length (cm) 3.9 cm 07/02/21 0850   Post-Procedure Width (cm) 2.6 cm 07/02/21 0850   Post-Procedure Depth (cm) 0.2 cm 07/02/21 0850   Post-Procedure Surface Area (cm^2) 10.14 cm^2 07/02/21 0850   Post-Procedure Volume (cm^3) 2. 028 cm^3 07/02/21 0850   Wound Assessment Fibrin;Pink/red;Slough 07/02/21 0830   Drainage Amount Moderate 07/02/21 0830   Drainage Description Serosanguinous; Yellow 07/02/21 0830   Odor None 07/02/21 0830   Ying-wound Assessment Intact 07/02/21 0830   Number of days: 97          Procedure Note  Indications:  Based on my examination of this patient's wound(s)/ulcer(s) today, debridement is required to promote healing and evaluate the wound base. Performed by: Igor Peter DPM    Consent obtained:  Yes    Time out taken:  Yes    Pain Control: Anesthetic  Anesthetic: 4% Lidocaine Liquid Topical     Debridement:Excisional Debridement    Using #15 blade scalpel the wound(s)/ulcer(s) was/were sharply debrided down through and including the removal of subcutaneous tissue.         Devitalized Tissue Debrided:  fibrin, biofilm, slough and necrotic/eschar to stimulate bleeding to promote healing, post debridement good bleeding base and wound edges noted    Wound/Ulcer #: 1 and 2    Percent of Wound/Ulcer Debrided: 100%    Total Surface Area Debrided:  10 sq cm     Estimated Blood Loss:  Minimal  Hemostasis Achieved:  by pressure    Procedural Pain:  3  / 10   Post Procedural Pain:  4 / 10     Response to treatment:  Well tolerated by patient. A culture was done. Plan:     Pt is not a smoker   - Discussed relationship of smoking and negative affects on wound healing   - Emphasized importance of tobacco avoidace/cessation   - Script for nicotine patch given to patient   - Information regarding support groups for smoking cessation given    In my professional opinion and based off the information that is available at this time this patient has appropriate indication for HBO Therapy: Maybe    Treatment Note please see attached Discharge Instructions    Written patient dismissal instructions given to patient and signed by patient or POA. Discharge Instructions         Discharge Instructions      Visit Discharge/Physician Orders     Discharge condition: Stable     Assessment of pain at discharge:  none     Anesthetic used: 4% lidocaine solutiion     Discharge to: Home     Left via:Private automobile     Accompanied by: accompanied by grand daughter     ECF/HHA:      Dressing Orders: Clean bilateral lower leg ulcers with normal saline. Apply bhavana dry dressing and unnaboot. Change weekly at wound care center    pad left lateral  foot well with foam     Treatment Orders:         [facility] followup visit __________1 WEEK, Friday___________  (Please note your next appointment above and if you are unable to keep, kindly give a 24 hour notice.  Thank you.)     Physician signature:__________________________        If you experience any of the following, please call the [facility] [address] during business hours:     * Increase in Pain  * Temperature over 101  * Increase in drainage from your wound  * Drainage with a foul odor  * Bleeding  * Increase in swelling  * Need for compression bandage changes due to slippage, breakthrough drainage.     If you need medical attention outside of the business hours of the [address] [place] [address] Road please contact your PCP or go to the nearest emergency room.                                                                                                     Electronically signed by Bakari Larsen DPM on 7/2/2021 at 8:52 AM

## 2021-07-02 NOTE — PLAN OF CARE
Problem: Wound:  Goal: Will show signs of wound healing; wound closure and no evidence of infection  Description: Will show signs of wound healing; wound closure and no evidence of infection  Outcome: Ongoing     Problem: Venous:  Goal: Signs of wound healing will improve  Description: Signs of wound healing will improve  Outcome: Ongoing     Problem: Compression therapy:  Goal: Will be free from complications associated with compression therapy  Description: Will be free from complications associated with compression therapy  Outcome: Ongoing     Problem: Falls - Risk of:  Goal: Will remain free from falls  Description: Will remain free from falls  Outcome: Ongoing

## 2021-07-09 ENCOUNTER — HOSPITAL ENCOUNTER (OUTPATIENT)
Dept: WOUND CARE | Age: 71
Discharge: HOME | End: 2021-07-09
Payer: MEDICARE

## 2021-07-09 VITALS
BODY MASS INDEX: 40.97 KG/M2 | HEART RATE: 84 BPM | RESPIRATION RATE: 20 BRPM | SYSTOLIC BLOOD PRESSURE: 144 MMHG | HEIGHT: 64 IN | WEIGHT: 240 LBS | DIASTOLIC BLOOD PRESSURE: 78 MMHG | TEMPERATURE: 96.7 F

## 2021-07-09 DIAGNOSIS — R60.9 VENOUS STASIS ULCER WITH EDEMA OF LOWER LEG (HCC): Primary | ICD-10-CM

## 2021-07-09 DIAGNOSIS — L97.909 VENOUS STASIS ULCER WITH EDEMA OF LOWER LEG (HCC): Primary | ICD-10-CM

## 2021-07-09 DIAGNOSIS — I83.899 VENOUS STASIS ULCER WITH EDEMA OF LOWER LEG (HCC): Primary | ICD-10-CM

## 2021-07-09 DIAGNOSIS — L97.922 NON-PRESSURE CHRONIC ULCER OF LOWER LEG, LEFT, WITH FAT LAYER EXPOSED (HCC): ICD-10-CM

## 2021-07-09 DIAGNOSIS — L97.812 NON-PRESSURE CHRONIC ULCER OF OTHER PART OF RIGHT LOWER LEG WITH FAT LAYER EXPOSED (HCC): ICD-10-CM

## 2021-07-09 DIAGNOSIS — I83.009 VENOUS STASIS ULCER WITH EDEMA OF LOWER LEG (HCC): Primary | ICD-10-CM

## 2021-07-09 PROCEDURE — 11042 DBRDMT SUBQ TIS 1ST 20SQCM/<: CPT

## 2021-07-09 PROCEDURE — 6370000000 HC RX 637 (ALT 250 FOR IP): Performed by: PODIATRIST

## 2021-07-09 RX ORDER — CLOBETASOL PROPIONATE 0.5 MG/G
OINTMENT TOPICAL ONCE
Status: CANCELLED | OUTPATIENT
Start: 2021-07-09 | End: 2021-07-09

## 2021-07-09 RX ORDER — LIDOCAINE 50 MG/G
OINTMENT TOPICAL ONCE
Status: CANCELLED | OUTPATIENT
Start: 2021-07-09 | End: 2021-07-09

## 2021-07-09 RX ORDER — HYDROCODONE BITARTRATE AND ACETAMINOPHEN 5; 325 MG/1; MG/1
1 TABLET ORAL EVERY 8 HOURS PRN
COMMUNITY

## 2021-07-09 RX ORDER — BACITRACIN, NEOMYCIN, POLYMYXIN B 400; 3.5; 5 [USP'U]/G; MG/G; [USP'U]/G
OINTMENT TOPICAL ONCE
Status: CANCELLED | OUTPATIENT
Start: 2021-07-09 | End: 2021-07-09

## 2021-07-09 RX ORDER — GINSENG 100 MG
CAPSULE ORAL ONCE
Status: CANCELLED | OUTPATIENT
Start: 2021-07-09 | End: 2021-07-09

## 2021-07-09 RX ORDER — LIDOCAINE HYDROCHLORIDE 20 MG/ML
JELLY TOPICAL ONCE
Status: CANCELLED | OUTPATIENT
Start: 2021-07-09 | End: 2021-07-09

## 2021-07-09 RX ORDER — BETAMETHASONE DIPROPIONATE 0.05 %
OINTMENT (GRAM) TOPICAL ONCE
Status: CANCELLED | OUTPATIENT
Start: 2021-07-09 | End: 2021-07-09

## 2021-07-09 RX ORDER — BACITRACIN ZINC AND POLYMYXIN B SULFATE 500; 1000 [USP'U]/G; [USP'U]/G
OINTMENT TOPICAL ONCE
Status: CANCELLED | OUTPATIENT
Start: 2021-07-09 | End: 2021-07-09

## 2021-07-09 RX ORDER — LIDOCAINE HYDROCHLORIDE 40 MG/ML
SOLUTION TOPICAL ONCE
Status: CANCELLED | OUTPATIENT
Start: 2021-07-09 | End: 2021-07-09

## 2021-07-09 RX ORDER — GENTAMICIN SULFATE 1 MG/G
OINTMENT TOPICAL ONCE
Status: CANCELLED | OUTPATIENT
Start: 2021-07-09 | End: 2021-07-09

## 2021-07-09 RX ORDER — LIDOCAINE HYDROCHLORIDE 40 MG/ML
SOLUTION TOPICAL ONCE
Status: COMPLETED | OUTPATIENT
Start: 2021-07-09 | End: 2021-07-09

## 2021-07-09 RX ORDER — LIDOCAINE 40 MG/G
CREAM TOPICAL ONCE
Status: CANCELLED | OUTPATIENT
Start: 2021-07-09 | End: 2021-07-09

## 2021-07-09 RX ADMIN — LIDOCAINE HYDROCHLORIDE: 40 SOLUTION TOPICAL at 09:13

## 2021-07-09 ASSESSMENT — PAIN SCALES - GENERAL: PAINLEVEL_OUTOF10: 0

## 2021-07-09 NOTE — PROGRESS NOTES
[facility]aling Center  History and Physical/Consultation  Podiatry    Referring Physician : Cris Breen DO  Deepak Alvarado  MEDICAL RECORD NUMBER:  [id number]  AGE: 66 y.o. GENDER: female  : 1950  EPISODE DATE:  2021  Subjective:     Chief Complaint   Patient presents with    Wound Check     bilateral legs         HISTORY of PRESENT ILLNESS HPI     Deepak Alvarado is a 66 y.o. female who presents today for wound/ulcer evaluation. History of Wound Context:  The patient has had a wound of b/l legs venous which was first noted approximately months ago. This has been treated unna boots. On their initial visit to the wound healing center, 3/26/20 ,  the patient has noted that the wound has not been improving. The patient has had similar previous wounds in the past.      Pt is not on abx at time of initial visit.       Wound/Ulcer Pain Timing/Severity: constant  Quality of pain: aching  Severity:  4 / 10   Modifying Factors: Pain worsens with walking  Associated Signs/Symptoms: edema, erythema, drainage and numbness    Ulcer Identification:  Ulcer Type: venous  Contributing Factors: edema, venous stasis and lymphedema    Diabetic/Pressure/Non Pressure Ulcers onl y:  Ulcer: Non-Pressure ulcer, fat layer exposed    4-16-21  Debrided, cont tx and care, profore lyte with bhavana  If patient has diabetic lower extremity wounds    4--21  Debrided, cont tx and care     4-28-21  Debrided, healing well    5-7-21  Debrided, cont tx and care     5-12-21  Debrided, cont tx and care     5-19-21  Debrided, cont tx and care    528-21  Debrided, cont tx and care    6-4-21  Debrided, cont tx and care     6--21  Debrided, cont and care    6-16-21  Debrided, cont tx and care    625-21  DEBRIDED, HEALING WELL    7-2-21  Debrided    7-9-21  Debrided, cont tx and care   Nunez Classification of diabetic lower extremity wounds:    Grade Description   []  0 No open wound   []  1 Superficial ulcer involving the full skin thickness   []  2 Deep ulcer involves ligament, tendon, joint capsule, or fascia  No bone involvement or abscess presence   []  3 Deep Ulcer with abcess formation and/or osteomyelitis   []  4 Localized gangrene   []  5 Extensive gangrene of the foot     Wound: N/A        PAST MEDICAL HISTORY      Diagnosis Date    Arthritis     Bleeding ulcer     Hypertension      Past Surgical History:   Procedure Laterality Date     SECTION      CHOLECYSTECTOMY, LAPAROSCOPIC  2016    ERCP  2016    HYSTERECTOMY      JOINT REPLACEMENT Left     left knee     History reviewed. No pertinent family history. Social History     Tobacco Use    Smoking status: Never Smoker    Smokeless tobacco: Never Used   Vaping Use    Vaping Use: Never used   Substance Use Topics    Alcohol use: No    Drug use: No     No Known Allergies  Current Outpatient Medications on File Prior to Encounter   Medication Sig Dispense Refill    HYDROcodone-acetaminophen (NORCO) 5-325 MG per tablet Take 1 tablet by mouth every 8 hours as needed for Pain.       furosemide (LASIX) 40 MG tablet Take 40 mg by mouth daily      potassium chloride (KLOR-CON) 20 MEQ packet Take 20 mEq by mouth daily      lisinopril-hydroCHLOROthiazide (PRINZIDE;ZESTORETIC) 20-25 MG per tablet Take 1 tablet by mouth daily      acetaminophen (TYLENOL) 500 MG tablet Take 500 mg by mouth every 6 hours as needed for Pain      metoprolol tartrate (LOPRESSOR) 50 MG tablet Take 1 tablet by mouth 2 times daily 60 tablet 3    amLODIPine (NORVASC) 10 MG tablet Take 1 tablet by mouth daily (Patient taking differently: Take 5 mg by mouth daily ) 30 tablet 3    pantoprazole (PROTONIX) 40 MG tablet Take 1 tablet by mouth every morning (before breakfast) 30 tablet 0    calcium carbonate (OSCAL) 500 MG TABS tablet Take 1,000 mg by mouth daily      vitamin B-12 (CYANOCOBALAMIN) 100 MCG tablet Take 50 mcg by mouth daily       No current facility-administered medications on file prior to encounter. REVIEW OF SYSTEMS   ROS : All others Negative if blank [], Positive if [x]  General Vascular   [] Fevers [] Claudication   [] Chills [] Rest Pain   Skin Neurologic   [x] Tissue Loss [x] Lower extremity neuropathy     Objective:    BP (!) 144/78   Pulse 84   Temp 96.7 °F (35.9 °C) (Temporal)   Resp 20   Ht 5' 4\" (1.626 m)   Wt 240 lb (108.9 kg)   BMI 41.20 kg/m²   Wt Readings from Last 3 Encounters:   07/09/21 240 lb (108.9 kg)   07/02/21 240 lb (108.9 kg)   06/04/21 240 lb (108.9 kg)       PHYSICAL EXAM   CONSTITUTIONAL:   Awake, alert, cooperative   PSYCHIATRIC :  Oriented to time, place and person      normal insight to disease process  EXTREMITIES:   R LE Open wounds are noted   Skin color is abnormal with ulcers   Edema is  noted   Sensation intact to light touch   Palpation of the foot does cause pain   4/5 strength DF/PF  L LE Open wounds are noted   Skin color is abnormal with ulcers   Edema is  noted   Sensation intact to light touch   Palpation of the foot does cause pain   4/5 strength DF/PF  R dorsalis pedis 1 L dorsalis pedis 1   R posterior tibial 1 L posterior tibial 1     Assessment:     Problem List Items Addressed This Visit     Non-pressure chronic ulcer of other part of right lower leg with fat layer exposed ([address]    Relevant Orders    Initiate Outpatient Wound Care Protocol    Non-pressure chronic ulcer of lower leg, left, with fat layer exposed ([address]    Relevant Orders    Initiate Outpatient Wound Care Protocol    Venous stasis ulcer with edema of lower leg ([address] - Primary    Relevant Orders    Initiate Outpatient Wound Care Protocol          Pre Debridement Measurements:  Are located in the [place]  Documentation Flow Sheet  Post Debridement Measurements:  Wound/Ulcer Descriptions are Pre Debridement except measurements:     Incision 07/08/16 Abdomen Mid;Right;Upper; Outer (Active)   Number of days: 1826       Wound 03/26/21 Leg Left; Lower #2 blocked (Active)   Wound Image   07/02/21 0830   Dressing Status New dressing applied 07/02/21 1124   Wound Cleansed Cleansed with saline 07/02/21 1124   Dressing/Treatment Collagen with Ag;ABD; Foam 07/02/21 1124   Offloading for Diabetic Foot Ulcers No offloading required 07/02/21 1124   Wound Length (cm) 3.9 cm 07/09/21 0912   Wound Width (cm) 1.4 cm 07/09/21 0912   Wound Depth (cm) 0.2 cm 07/09/21 0912   Wound Surface Area (cm^2) 5.46 cm^2 07/09/21 0912   Change in Wound Size % (l*w) 94.94 07/09/21 0912   Wound Volume (cm^3) 1.092 cm^3 07/09/21 0912   Wound Healing % 95 07/09/21 0912   Post-Procedure Length (cm) 3.9 cm 07/02/21 0850   Post-Procedure Width (cm) 2.6 cm 07/02/21 0850   Post-Procedure Depth (cm) 0.2 cm 07/02/21 0850   Post-Procedure Surface Area (cm^2) 10.14 cm^2 07/02/21 0850   Post-Procedure Volume (cm^3) 2. 028 cm^3 07/02/21 0850   Wound Assessment Fibrin;Pink/red;Slough 07/09/21 0912   Drainage Amount Small 07/09/21 0912   Drainage Description Serosanguinous; Yellow 07/09/21 0912   Odor None 07/09/21 0912   Ying-wound Assessment Intact 07/09/21 0912   Number of days: 104          Procedure Note  Indications:  Based on my examination of this patient's wound(s)/ulcer(s) today, debridement is required to promote healing and evaluate the wound base. Performed by: Kamini Maxwell DPM    Consent obtained:  Yes    Time out taken:  Yes    Pain Control: Anesthetic  Anesthetic: 4% Lidocaine Liquid Topical     Debridement:Excisional Debridement    Using #15 blade scalpel the wound(s)/ulcer(s) was/were sharply debrided down through and including the removal of subcutaneous tissue.         Devitalized Tissue Debrided:  fibrin, biofilm, slough and necrotic/eschar to stimulate bleeding to promote healing, post debridement good bleeding base and wound edges noted    Wound/Ulcer #: 1 and 2    Percent of Wound/Ulcer Debrided: 100%    Total Surface Area Debrided:  6 sq cm     Estimated Blood Loss: Minimal  Hemostasis Achieved:  by pressure    Procedural Pain:  3  / 10   Post Procedural Pain:  4 / 10     Response to treatment:  Well tolerated by patient. A culture was done. Plan:     Pt is not a smoker   - Discussed relationship of smoking and negative affects on wound healing   - Emphasized importance of tobacco avoidace/cessation   - Script for nicotine patch given to patient   - Information regarding support groups for smoking cessation given    In my professional opinion and based off the information that is available at this time this patient has appropriate indication for HBO Therapy: Maybe    Treatment Note please see attached Discharge Instructions    Written patient dismissal instructions given to patient and signed by patient or POA. Discharge Instructions       Discharge Instructions      Visit Discharge/Physician Orders     Discharge condition: Stable     Assessment of pain at discharge:  none     Anesthetic used: 4% lidocaine solutiion     Discharge to: Home     Left via:Private automobile     Accompanied by: accompanied by grand daughter     ECF/HHA:      Dressing Orders: Clean bilateral lower leg ulcers with normal saline. Apply bhavana dry dressing and unnaboot. Change weekly at wound care center    pad left lateral  foot well with foam     Treatment Orders:         [facility] followup visit __________1 WEEK, Friday___________  (Please note your next appointment above and if you are unable to keep, kindly give a 24 hour notice.  Thank you.)     Physician signature:__________________________        If you experience any of the following, please call the [address] [place] [address] Road during business hours:     * Increase in Pain  * Temperature over 101  * Increase in drainage from your wound  * Drainage with a foul odor  * Bleeding  * Increase in swelling  * Need for compression bandage changes due to slippage, breakthrough drainage.     If you need medical attention outside of the business hours of the Wound Care Centers please contact your PCP or go to the nearest emergency room.                                                                                                                          Electronically signed by Reza Smith DPM on 7/9/2021 at 9:23 AM

## 2021-07-16 ENCOUNTER — HOSPITAL ENCOUNTER (OUTPATIENT)
Dept: WOUND CARE | Age: 71
Discharge: HOME | End: 2021-07-16
Payer: MEDICARE

## 2021-07-16 VITALS
DIASTOLIC BLOOD PRESSURE: 80 MMHG | BODY MASS INDEX: 40.97 KG/M2 | HEART RATE: 68 BPM | SYSTOLIC BLOOD PRESSURE: 142 MMHG | HEIGHT: 64 IN | TEMPERATURE: 98 F | WEIGHT: 240 LBS | RESPIRATION RATE: 20 BRPM

## 2021-07-16 DIAGNOSIS — I83.009 VENOUS STASIS ULCER WITH EDEMA OF LOWER LEG (HCC): Primary | ICD-10-CM

## 2021-07-16 DIAGNOSIS — L97.909 VENOUS STASIS ULCER WITH EDEMA OF LOWER LEG (HCC): Primary | ICD-10-CM

## 2021-07-16 DIAGNOSIS — L97.812 NON-PRESSURE CHRONIC ULCER OF OTHER PART OF RIGHT LOWER LEG WITH FAT LAYER EXPOSED (HCC): ICD-10-CM

## 2021-07-16 DIAGNOSIS — R60.9 VENOUS STASIS ULCER WITH EDEMA OF LOWER LEG (HCC): Primary | ICD-10-CM

## 2021-07-16 DIAGNOSIS — L97.922 NON-PRESSURE CHRONIC ULCER OF LOWER LEG, LEFT, WITH FAT LAYER EXPOSED (HCC): ICD-10-CM

## 2021-07-16 DIAGNOSIS — I83.899 VENOUS STASIS ULCER WITH EDEMA OF LOWER LEG (HCC): Primary | ICD-10-CM

## 2021-07-16 PROCEDURE — 11042 DBRDMT SUBQ TIS 1ST 20SQCM/<: CPT

## 2021-07-16 PROCEDURE — 6370000000 HC RX 637 (ALT 250 FOR IP): Performed by: PODIATRIST

## 2021-07-16 RX ORDER — CLOBETASOL PROPIONATE 0.5 MG/G
OINTMENT TOPICAL ONCE
Status: CANCELLED | OUTPATIENT
Start: 2021-07-16 | End: 2021-07-16

## 2021-07-16 RX ORDER — GINSENG 100 MG
CAPSULE ORAL ONCE
Status: CANCELLED | OUTPATIENT
Start: 2021-07-16 | End: 2021-07-16

## 2021-07-16 RX ORDER — GENTAMICIN SULFATE 1 MG/G
OINTMENT TOPICAL ONCE
Status: CANCELLED | OUTPATIENT
Start: 2021-07-16 | End: 2021-07-16

## 2021-07-16 RX ORDER — LIDOCAINE HYDROCHLORIDE 20 MG/ML
JELLY TOPICAL ONCE
Status: CANCELLED | OUTPATIENT
Start: 2021-07-16 | End: 2021-07-16

## 2021-07-16 RX ORDER — LIDOCAINE 50 MG/G
OINTMENT TOPICAL ONCE
Status: CANCELLED | OUTPATIENT
Start: 2021-07-16 | End: 2021-07-16

## 2021-07-16 RX ORDER — BETAMETHASONE DIPROPIONATE 0.05 %
OINTMENT (GRAM) TOPICAL ONCE
Status: CANCELLED | OUTPATIENT
Start: 2021-07-16 | End: 2021-07-16

## 2021-07-16 RX ORDER — LIDOCAINE HYDROCHLORIDE 40 MG/ML
SOLUTION TOPICAL ONCE
Status: CANCELLED | OUTPATIENT
Start: 2021-07-16 | End: 2021-07-16

## 2021-07-16 RX ORDER — LIDOCAINE 40 MG/G
CREAM TOPICAL ONCE
Status: CANCELLED | OUTPATIENT
Start: 2021-07-16 | End: 2021-07-16

## 2021-07-16 RX ORDER — LIDOCAINE HYDROCHLORIDE 40 MG/ML
SOLUTION TOPICAL ONCE
Status: COMPLETED | OUTPATIENT
Start: 2021-07-16 | End: 2021-07-16

## 2021-07-16 RX ORDER — BACITRACIN, NEOMYCIN, POLYMYXIN B 400; 3.5; 5 [USP'U]/G; MG/G; [USP'U]/G
OINTMENT TOPICAL ONCE
Status: CANCELLED | OUTPATIENT
Start: 2021-07-16 | End: 2021-07-16

## 2021-07-16 RX ORDER — BACITRACIN ZINC AND POLYMYXIN B SULFATE 500; 1000 [USP'U]/G; [USP'U]/G
OINTMENT TOPICAL ONCE
Status: CANCELLED | OUTPATIENT
Start: 2021-07-16 | End: 2021-07-16

## 2021-07-16 RX ADMIN — LIDOCAINE HYDROCHLORIDE 10 ML: 40 SOLUTION TOPICAL at 09:34

## 2021-07-16 NOTE — PLAN OF CARE
Problem: Venous:  Goal: Signs of wound healing will improve  Description: Signs of wound healing will improve  Outcome: Ongoing     Problem: Compression therapy:  Goal: Will be free from complications associated with compression therapy  Description: Will be free from complications associated with compression therapy  Outcome: Ongoing     Problem: Wound:  Goal: Will show signs of wound healing; wound closure and no evidence of infection  Description: Will show signs of wound healing; wound closure and no evidence of infection  Outcome: Ongoing

## 2021-07-16 NOTE — PROGRESS NOTES
Wound Healing Center  History and Physical/Consultation  Podiatry    Referring Physician : Mark Anthony Lolyd DO  Britt Patel  MEDICAL RECORD NUMBER:  [id number]  AGE: 66 y.o. GENDER: female  : 1950  EPISODE DATE:  2021  Subjective:     Chief Complaint   Patient presents with    Wound Check     left leg         HISTORY of PRESENT ILLNESS HANH Patel is a 66 y.o. female who presents today for wound/ulcer evaluation. History of Wound Context:  The patient has had a wound of b/l legs venous which was first noted approximately months ago. This has been treated unna boots. On their initial visit to the wound healing center, 3/26/20 ,  the patient has noted that the wound has not been improving. The patient has had similar previous wounds in the past.      Pt is not on abx at time of initial visit.       Wound/Ulcer Pain Timing/Severity: constant  Quality of pain: aching  Severity:  4 / 10   Modifying Factors: Pain worsens with walking  Associated Signs/Symptoms: edema, erythema, drainage and numbness    Ulcer Identification:  Ulcer Type: venous  Contributing Factors: edema, venous stasis and lymphedema    Diabetic/Pressure/Non Pressure Ulcers onl y:  Ulcer: Non-Pressure ulcer, fat layer exposed    4-16-21  Debrided, cont tx and care, profore lyte with bhavana  If patient has diabetic lower extremity wounds    4-21-21  Debrided, cont tx and care     4-28-21  Debrided, healing well    5-7-21  Debrided, cont tx and care     5-12-21  Debrided, cont tx and care     5-19-21  Debrided, cont tx and care    528-21  Debrided, cont tx and care    6-4-21  Debrided, cont tx and care     6--21  Debrided, cont and care    6-16-21  Debrided, cont tx and care    625-21  DEBRIDED, HEALING WELL    7-2-21  Debrided    7-9-21  Debrided, cont tx and care     7-16-21  Debrided, cont tx and care   Nunez Classification of diabetic lower extremity wounds:    Grade Description   []  0 No open wound   []  1 current facility-administered medications on file prior to encounter. REVIEW OF SYSTEMS   ROS : All others Negative if blank [], Positive if [x]  General Vascular   [] Fevers [] Claudication   [] Chills [] Rest Pain   Skin Neurologic   [x] Tissue Loss [x] Lower extremity neuropathy     Objective:    BP (!) 142/80   Pulse 68   Temp 98 °F (36.7 °C) (Temporal)   Resp 20   Ht 5' 4\" (1.626 m)   Wt 240 lb (108.9 kg)   BMI 41.20 kg/m²   Wt Readings from Last 3 Encounters:   07/16/21 240 lb (108.9 kg)   07/09/21 240 lb (108.9 kg)   07/02/21 240 lb (108.9 kg)       PHYSICAL EXAM   CONSTITUTIONAL:   Awake, alert, cooperative   PSYCHIATRIC :  Oriented to time, place and person      normal insight to disease process  EXTREMITIES:   R LE Open wounds are noted   Skin color is abnormal with ulcers   Edema is  noted   Sensation intact to light touch   Palpation of the foot does cause pain   4/5 strength DF/PF  L LE Open wounds are noted   Skin color is abnormal with ulcers   Edema is  noted   Sensation intact to light touch   Palpation of the foot does cause pain   4/5 strength DF/PF  R dorsalis pedis 1 L dorsalis pedis 1   R posterior tibial 1 L posterior tibial 1     Assessment:     Problem List Items Addressed This Visit     Non-pressure chronic ulcer of other part of right lower leg with fat layer exposed ([address]    Relevant Orders    Initiate Outpatient Wound Care Protocol    Non-pressure chronic ulcer of lower leg, left, with fat layer exposed ([address]    Relevant Orders    Initiate Outpatient Wound Care Protocol    Venous stasis ulcer with edema of lower leg ([address] - Primary    Relevant Orders    Initiate Outpatient Wound Care Protocol          Pre Debridement Measurements:  Are located in the [place]  Documentation Flow Sheet  Post Debridement Measurements:  Wound/Ulcer Descriptions are Pre Debridement except measurements:     Incision 07/08/16 Abdomen Mid;Right;Upper; Outer (Active)   Number of days: 7333 Wound 03/26/21 Leg Left; Lower #2 blocked (Active)   Wound Image   07/02/21 0830   Dressing Status New dressing applied 07/09/21 1155   Wound Cleansed Cleansed with saline 07/09/21 1155   Dressing/Treatment Collagen; Hydrating gel 07/09/21 1155   Offloading for Diabetic Foot Ulcers No 07/09/21 1155   Wound Length (cm) 1 cm 07/16/21 0932   Wound Width (cm) 1.1 cm 07/16/21 0932   Wound Depth (cm) 0.1 cm 07/16/21 0932   Wound Surface Area (cm^2) 1.1 cm^2 07/16/21 0932   Change in Wound Size % (l*w) 98.98 07/16/21 0932   Wound Volume (cm^3) 0.11 cm^3 07/16/21 0932   Wound Healing % 99 07/16/21 0932   Post-Procedure Length (cm) 3.9 cm 07/09/21 0924   Post-Procedure Width (cm) 1.4 cm 07/09/21 0924   Post-Procedure Depth (cm) 0.2 cm 07/09/21 0924   Post-Procedure Surface Area (cm^2) 5.46 cm^2 07/09/21 0924   Post-Procedure Volume (cm^3) 1.092 cm^3 07/09/21 0924   Wound Assessment Pink/red 07/16/21 0932   Drainage Amount Scant 07/16/21 0932   Drainage Description Serosanguinous 07/16/21 0932   Odor None 07/16/21 0932   Ying-wound Assessment Intact 07/16/21 0932   Number of days: 112          Procedure Note  Indications:  Based on my examination of this patient's wound(s)/ulcer(s) today, debridement is required to promote healing and evaluate the wound base. Performed by: Romy Dubon DPM    Consent obtained:  Yes    Time out taken:  Yes    Pain Control: Anesthetic  Anesthetic: 4% Lidocaine Liquid Topical     Debridement:Excisional Debridement    Using #15 blade scalpel the wound(s)/ulcer(s) was/were sharply debrided down through and including the removal of subcutaneous tissue.         Devitalized Tissue Debrided:  fibrin, biofilm, slough and necrotic/eschar to stimulate bleeding to promote healing, post debridement good bleeding base and wound edges noted    Wound/Ulcer #: 1 and 2    Percent of Wound/Ulcer Debrided: 100%    Total Surface Area Debrided:  1 sq cm     Estimated Blood Loss:  Minimal  Hemostasis Achieved:  by pressure    Procedural Pain:  3  / 10   Post Procedural Pain:  4 / 10     Response to treatment:  Well tolerated by patient. A culture was done. Plan:     Pt is not a smoker   - Discussed relationship of smoking and negative affects on wound healing   - Emphasized importance of tobacco avoidace/cessation   - Script for nicotine patch given to patient   - Information regarding support groups for smoking cessation given    In my professional opinion and based off the information that is available at this time this patient has appropriate indication for HBO Therapy: Maybe    Treatment Note please see attached Discharge Instructions    Written patient dismissal instructions given to patient and signed by patient or POA. Discharge Instructions       Visit Discharge/Physician Orders         Discharge condition: Stable     Assessment of pain at discharge:  none     Anesthetic used: 4% lidocaine solutiion     Discharge to: Home     Left via:Private automobile     Accompanied by: accompanied by grand daughter     ECF/HHA:      Dressing Orders:  Wounds on right leg now healed. Apply good skin emollient daily and wear SPANDAGRIP. To remaining left leg wounds, apply COLLAGEN, WITH HYDROGEL, AND GEL CAST AND COBAN for 7 days. Do not allow leg wrap to become wet.    pad left lateral  foot well with foam     Treatment Orders: elevate legs when seated. Wear SPANDAGRIP right leg during day.         [facility] followup visit __________1 WEEK, Friday___________  (Please note your next appointment above and if you are unable to keep, kindly give a 24 hour notice.  Thank you.)    Physician signature:__________________________      If you experience any of the following, please call the [address] [place] [address] Road during business hours:    * Increase in Pain  * Temperature over 101  * Increase in drainage from your wound  * Drainage with a foul odor  * Bleeding  * Increase in swelling  * Need for compression bandage changes due to slippage, breakthrough drainage. If you need medical attention outside of the business hours of the [address] [place] [address] Road please contact your PCP or go to the nearest emergency room.         Electronically signed by Marquise Perez DPM on 7/16/2021 at 10:17 AM

## 2021-07-21 ENCOUNTER — HOSPITAL ENCOUNTER (OUTPATIENT)
Dept: WOUND CARE | Age: 71
Discharge: HOME | End: 2021-07-21
Payer: MEDICARE

## 2021-07-21 VITALS — SYSTOLIC BLOOD PRESSURE: 118 MMHG | RESPIRATION RATE: 20 BRPM | DIASTOLIC BLOOD PRESSURE: 70 MMHG | TEMPERATURE: 98 F

## 2021-07-21 DIAGNOSIS — L97.812 NON-PRESSURE CHRONIC ULCER OF OTHER PART OF RIGHT LOWER LEG WITH FAT LAYER EXPOSED (HCC): ICD-10-CM

## 2021-07-21 DIAGNOSIS — L97.909 VENOUS STASIS ULCER WITH EDEMA OF LOWER LEG (HCC): Primary | ICD-10-CM

## 2021-07-21 DIAGNOSIS — R60.9 VENOUS STASIS ULCER WITH EDEMA OF LOWER LEG (HCC): Primary | ICD-10-CM

## 2021-07-21 DIAGNOSIS — I83.899 VENOUS STASIS ULCER WITH EDEMA OF LOWER LEG (HCC): Primary | ICD-10-CM

## 2021-07-21 DIAGNOSIS — I83.009 VENOUS STASIS ULCER WITH EDEMA OF LOWER LEG (HCC): Primary | ICD-10-CM

## 2021-07-21 DIAGNOSIS — L97.922 NON-PRESSURE CHRONIC ULCER OF LOWER LEG, LEFT, WITH FAT LAYER EXPOSED (HCC): ICD-10-CM

## 2021-07-21 PROCEDURE — 99213 OFFICE O/P EST LOW 20 MIN: CPT

## 2021-07-21 RX ORDER — LIDOCAINE HYDROCHLORIDE 20 MG/ML
JELLY TOPICAL ONCE
Status: CANCELLED | OUTPATIENT
Start: 2021-07-21 | End: 2021-07-21

## 2021-07-21 RX ORDER — BACITRACIN, NEOMYCIN, POLYMYXIN B 400; 3.5; 5 [USP'U]/G; MG/G; [USP'U]/G
OINTMENT TOPICAL ONCE
Status: CANCELLED | OUTPATIENT
Start: 2021-07-21 | End: 2021-07-21

## 2021-07-21 RX ORDER — BACITRACIN ZINC AND POLYMYXIN B SULFATE 500; 1000 [USP'U]/G; [USP'U]/G
OINTMENT TOPICAL ONCE
Status: CANCELLED | OUTPATIENT
Start: 2021-07-21 | End: 2021-07-21

## 2021-07-21 RX ORDER — LIDOCAINE 50 MG/G
OINTMENT TOPICAL ONCE
Status: CANCELLED | OUTPATIENT
Start: 2021-07-21 | End: 2021-07-21

## 2021-07-21 RX ORDER — LIDOCAINE HYDROCHLORIDE 40 MG/ML
SOLUTION TOPICAL ONCE
Status: CANCELLED | OUTPATIENT
Start: 2021-07-21 | End: 2021-07-21

## 2021-07-21 RX ORDER — LIDOCAINE 40 MG/G
CREAM TOPICAL ONCE
Status: CANCELLED | OUTPATIENT
Start: 2021-07-21 | End: 2021-07-21

## 2021-07-21 RX ORDER — GINSENG 100 MG
CAPSULE ORAL ONCE
Status: CANCELLED | OUTPATIENT
Start: 2021-07-21 | End: 2021-07-21

## 2021-07-21 RX ORDER — CLOBETASOL PROPIONATE 0.5 MG/G
OINTMENT TOPICAL ONCE
Status: CANCELLED | OUTPATIENT
Start: 2021-07-21 | End: 2021-07-21

## 2021-07-21 RX ORDER — GENTAMICIN SULFATE 1 MG/G
OINTMENT TOPICAL ONCE
Status: CANCELLED | OUTPATIENT
Start: 2021-07-21 | End: 2021-07-21

## 2021-07-21 RX ORDER — BETAMETHASONE DIPROPIONATE 0.05 %
OINTMENT (GRAM) TOPICAL ONCE
Status: CANCELLED | OUTPATIENT
Start: 2021-07-21 | End: 2021-07-21

## 2021-07-21 ASSESSMENT — PAIN SCALES - GENERAL: PAINLEVEL_OUTOF10: 0

## 2021-07-21 NOTE — PROGRESS NOTES
[facility]aling Center  History and Physical/Consultation  Podiatry    Referring Physician : Vega Haider DO  Lucia Segura  MEDICAL RECORD NUMBER:  [id number]  AGE: 66 y.o. GENDER: female  : 1950  EPISODE DATE:  2021  Subjective:     Chief Complaint   Patient presents with    Wound Check         HISTORY of PRESENT ILLNESS HPI     Lucia Segura is a 66 y.o. female who presents today for wound/ulcer evaluation. History of Wound Context:  The patient has had a wound of b/l legs venous which was first noted approximately months ago. This has been treated unna boots. On their initial visit to the wound healing center, 3/26/20 ,  the patient has noted that the wound has not been improving. The patient has had similar previous wounds in the past.      Pt is not on abx at time of initial visit.       Wound/Ulcer Pain Timing/Severity: constant  Quality of pain: aching  Severity:  4 / 10   Modifying Factors: Pain worsens with walking  Associated Signs/Symptoms: edema, erythema, drainage and numbness    Ulcer Identification:  Ulcer Type: venous  Contributing Factors: edema, venous stasis and lymphedema    Diabetic/Pressure/Non Pressure Ulcers onl y:  Ulcer: Non-Pressure ulcer, fat layer exposed    4-16-21  Debrided, cont tx and care, profore lyte with bhavana  If patient has diabetic lower extremity wounds    4--21  Debrided, cont tx and care     4-28-21  Debrided, healing well    5-7-21  Debrided, cont tx and care     5-12-21  Debrided, cont tx and care     519-21  Debrided, cont tx and care    528-21  Debrided, cont tx and care    6-4-21  Debrided, cont tx and care     6--21  Debrided, cont and care    6-16-21  Debrided, cont tx and care    625-21  DEBRIDED, HEALING WELL    7-2-21  Debrided    7-9-21  Debrided, cont tx and care     7-16-21  Debrided, cont tx and care   Nunez Classification of diabetic lower extremity wounds:    Grade Description   []  0 No open wound   []  1 Superficial ulcer involving the full skin thickness   []  2 Deep ulcer involves ligament, tendon, joint capsule, or fascia  No bone involvement or abscess presence   []  3 Deep Ulcer with abcess formation and/or osteomyelitis   []  4 Localized gangrene   []  5 Extensive gangrene of the foot     Wound: N/A        PAST MEDICAL HISTORY      Diagnosis Date    Arthritis     Bleeding ulcer     Hypertension      Past Surgical History:   Procedure Laterality Date     SECTION      CHOLECYSTECTOMY, LAPAROSCOPIC  2016    ERCP  2016    HYSTERECTOMY      JOINT REPLACEMENT Left     left knee     History reviewed. No pertinent family history. Social History     Tobacco Use    Smoking status: Never Smoker    Smokeless tobacco: Never Used   Vaping Use    Vaping Use: Never used   Substance Use Topics    Alcohol use: No    Drug use: No     No Known Allergies  Current Outpatient Medications on File Prior to Encounter   Medication Sig Dispense Refill    HYDROcodone-acetaminophen (NORCO) 5-325 MG per tablet Take 1 tablet by mouth every 8 hours as needed for Pain.       furosemide (LASIX) 40 MG tablet Take 40 mg by mouth daily      potassium chloride (KLOR-CON) 20 MEQ packet Take 20 mEq by mouth daily      lisinopril-hydroCHLOROthiazide (PRINZIDE;ZESTORETIC) 20-25 MG per tablet Take 1 tablet by mouth daily      acetaminophen (TYLENOL) 500 MG tablet Take 500 mg by mouth every 6 hours as needed for Pain      metoprolol tartrate (LOPRESSOR) 50 MG tablet Take 1 tablet by mouth 2 times daily 60 tablet 3    amLODIPine (NORVASC) 10 MG tablet Take 1 tablet by mouth daily (Patient taking differently: Take 5 mg by mouth daily ) 30 tablet 3    pantoprazole (PROTONIX) 40 MG tablet Take 1 tablet by mouth every morning (before breakfast) 30 tablet 0    vitamin B-12 (CYANOCOBALAMIN) 100 MCG tablet Take 50 mcg by mouth daily      calcium carbonate (OSCAL) 500 MG TABS tablet Take 1,000 mg by mouth daily       No current facility-administered medications on file prior to encounter. REVIEW OF SYSTEMS   ROS : All others Negative if blank [], Positive if [x]  General Vascular   [] Fevers [] Claudication   [] Chills [] Rest Pain   Skin Neurologic   [x] Tissue Loss [x] Lower extremity neuropathy     Objective:    /70   Temp 98 °F (36.7 °C) (Tympanic)   Resp 20   Wt Readings from Last 3 Encounters:   07/16/21 240 lb (108.9 kg)   07/09/21 240 lb (108.9 kg)   07/02/21 240 lb (108.9 kg)       PHYSICAL EXAM   CONSTITUTIONAL:   Awake, alert, cooperative   PSYCHIATRIC :  Oriented to time, place and person      normal insight to disease process  EXTREMITIES:   R LE Open wounds are noted   Skin color is abnormal with ulcers   Edema is  noted   Sensation intact to light touch   Palpation of the foot does cause pain   4/5 strength DF/PF  L LE Open wounds are noted   Skin color is abnormal with ulcers   Edema is  noted   Sensation intact to light touch   Palpation of the foot does cause pain   4/5 strength DF/PF  R dorsalis pedis 1 L dorsalis pedis 1   R posterior tibial 1 L posterior tibial 1     Assessment:     Problem List Items Addressed This Visit     Non-pressure chronic ulcer of other part of right lower leg with fat layer exposed ([address]    Non-pressure chronic ulcer of lower leg, left, with fat layer exposed ([address]    Venous stasis ulcer with edema of lower leg ([address]          Pre Debridement Measurements:  Are located in the [place]  Documentation Flow Sheet  Post Debridement Measurements:  Wound/Ulcer Descriptions are Pre Debridement except measurements:     Incision 07/08/16 Abdomen Mid;Right;Upper; Outer (Active)   Number of days: 1838       Wound 03/26/21 Leg Left; Lower #2 blocked (Active)   Wound Image   07/02/21 0830   Dressing Status New dressing applied 07/09/21 1155   Wound Cleansed Cleansed with saline 07/09/21 1155   Dressing/Treatment Collagen; Hydrating gel 07/09/21 1155   Offloading for Diabetic Foot Ulcers No 07/09/21 1155   Wound Length (cm) 1 cm 07/16/21 0932   Wound Width (cm) 1.1 cm 07/16/21 0932   Wound Depth (cm) 0.1 cm 07/16/21 0932   Wound Surface Area (cm^2) 1.1 cm^2 07/16/21 0932   Change in Wound Size % (l*w) 98.98 07/16/21 0932   Wound Volume (cm^3) 0.11 cm^3 07/16/21 0932   Wound Healing % 99 07/16/21 0932   Post-Procedure Length (cm) 1 cm 07/16/21 1019   Post-Procedure Width (cm) 1.1 cm 07/16/21 1019   Post-Procedure Depth (cm) 0.2 cm 07/09/21 0924   Post-Procedure Surface Area (cm^2) 1.1 cm^2 07/16/21 1019   Post-Procedure Volume (cm^3) 1.092 cm^3 07/09/21 0924   Wound Assessment Pink/red 07/16/21 0932   Drainage Amount Scant 07/16/21 0932   Drainage Description Serosanguinous 07/16/21 0932   Odor None 07/16/21 0932   Ying-wound Assessment Intact 07/16/21 0932   Number of days: 116          Procedure Note  Indications:  Based on my examination of this patient's wound(s)/ulcer(s) today, debridement is required to promote healing and evaluate the wound base. Performed by: Dequan Haas DPM    Consent obtained:  Yes    Time out taken:  Yes    Pain Control:       Debridement:Excisional Debridement    Using #15 blade scalpel the wound(s)/ulcer(s) was/were sharply debrided down through and including the removal of subcutaneous tissue. Devitalized Tissue Debrided:  fibrin, biofilm, slough and necrotic/eschar to stimulate bleeding to promote healing, post debridement good bleeding base and wound edges noted    Wound/Ulcer #: 1 and 2    Percent of Wound/Ulcer Debrided: 100%    Total Surface Area Debrided:  0 sq cm     Estimated Blood Loss:  Minimal  Hemostasis Achieved:  by pressure    Procedural Pain:  3  / 10   Post Procedural Pain:  4 / 10     Response to treatment:  Well tolerated by patient. A culture was done.   Healed     Plan:     Pt is not a smoker   - Discussed relationship of smoking and negative affects on wound healing   - Emphasized importance of tobacco avoidace/cessation   -

## 2021-07-28 ENCOUNTER — HOSPITAL ENCOUNTER (OUTPATIENT)
Dept: WOUND CARE | Age: 71
Discharge: HOME | End: 2021-07-28
Payer: MEDICARE

## 2021-07-28 VITALS
WEIGHT: 240 LBS | RESPIRATION RATE: 20 BRPM | DIASTOLIC BLOOD PRESSURE: 80 MMHG | HEART RATE: 68 BPM | HEIGHT: 64 IN | TEMPERATURE: 96.8 F | SYSTOLIC BLOOD PRESSURE: 142 MMHG | BODY MASS INDEX: 40.97 KG/M2

## 2021-07-28 DIAGNOSIS — R60.9 VENOUS STASIS ULCER WITH EDEMA OF LOWER LEG (HCC): Primary | ICD-10-CM

## 2021-07-28 DIAGNOSIS — L97.812 NON-PRESSURE CHRONIC ULCER OF OTHER PART OF RIGHT LOWER LEG WITH FAT LAYER EXPOSED (HCC): ICD-10-CM

## 2021-07-28 DIAGNOSIS — L97.922 NON-PRESSURE CHRONIC ULCER OF LOWER LEG, LEFT, WITH FAT LAYER EXPOSED (HCC): ICD-10-CM

## 2021-07-28 DIAGNOSIS — I83.899 VENOUS STASIS ULCER WITH EDEMA OF LOWER LEG (HCC): Primary | ICD-10-CM

## 2021-07-28 DIAGNOSIS — L97.909 VENOUS STASIS ULCER WITH EDEMA OF LOWER LEG (HCC): Primary | ICD-10-CM

## 2021-07-28 DIAGNOSIS — I83.009 VENOUS STASIS ULCER WITH EDEMA OF LOWER LEG (HCC): Primary | ICD-10-CM

## 2021-07-28 PROCEDURE — 99212 OFFICE O/P EST SF 10 MIN: CPT

## 2021-07-28 RX ORDER — BACITRACIN ZINC AND POLYMYXIN B SULFATE 500; 1000 [USP'U]/G; [USP'U]/G
OINTMENT TOPICAL ONCE
Status: CANCELLED | OUTPATIENT
Start: 2021-07-28 | End: 2021-07-28

## 2021-07-28 RX ORDER — GINSENG 100 MG
CAPSULE ORAL ONCE
Status: CANCELLED | OUTPATIENT
Start: 2021-07-28 | End: 2021-07-28

## 2021-07-28 RX ORDER — LIDOCAINE HYDROCHLORIDE 20 MG/ML
JELLY TOPICAL ONCE
Status: CANCELLED | OUTPATIENT
Start: 2021-07-28 | End: 2021-07-28

## 2021-07-28 RX ORDER — GENTAMICIN SULFATE 1 MG/G
OINTMENT TOPICAL ONCE
Status: CANCELLED | OUTPATIENT
Start: 2021-07-28 | End: 2021-07-28

## 2021-07-28 RX ORDER — BETAMETHASONE DIPROPIONATE 0.05 %
OINTMENT (GRAM) TOPICAL ONCE
Status: CANCELLED | OUTPATIENT
Start: 2021-07-28 | End: 2021-07-28

## 2021-07-28 RX ORDER — BACITRACIN, NEOMYCIN, POLYMYXIN B 400; 3.5; 5 [USP'U]/G; MG/G; [USP'U]/G
OINTMENT TOPICAL ONCE
Status: CANCELLED | OUTPATIENT
Start: 2021-07-28 | End: 2021-07-28

## 2021-07-28 RX ORDER — LIDOCAINE 50 MG/G
OINTMENT TOPICAL ONCE
Status: CANCELLED | OUTPATIENT
Start: 2021-07-28 | End: 2021-07-28

## 2021-07-28 RX ORDER — CLOBETASOL PROPIONATE 0.5 MG/G
OINTMENT TOPICAL ONCE
Status: CANCELLED | OUTPATIENT
Start: 2021-07-28 | End: 2021-07-28

## 2021-07-28 RX ORDER — LIDOCAINE 40 MG/G
CREAM TOPICAL ONCE
Status: CANCELLED | OUTPATIENT
Start: 2021-07-28 | End: 2021-07-28

## 2021-07-28 RX ORDER — LIDOCAINE HYDROCHLORIDE 40 MG/ML
SOLUTION TOPICAL ONCE
Status: CANCELLED | OUTPATIENT
Start: 2021-07-28 | End: 2021-07-28

## 2021-07-28 ASSESSMENT — PAIN SCALES - GENERAL: PAINLEVEL_OUTOF10: 0

## 2021-07-28 NOTE — PROGRESS NOTES
Wound Healing Center  History and Physical/Consultation  Podiatry    Referring Physician : Anne Sherman DO  Tiffany Nicole  MEDICAL RECORD NUMBER:  [id number]  AGE: 66 y.o. GENDER: female  : 1950  EPISODE DATE:  2021  Subjective:     No chief complaint on file. HISTORY of PRESENT ILLNESS HANH Nicole is a 66 y.o. female who presents today for wound/ulcer evaluation. History of Wound Context:  The patient has had a wound of b/l legs venous which was first noted approximately months ago. This has been treated unna boots. On their initial visit to the wound healing center, 3/26/20 ,  the patient has noted that the wound has not been improving. The patient has had similar previous wounds in the past.      Pt is not on abx at time of initial visit.       Wound/Ulcer Pain Timing/Severity: constant  Quality of pain: aching  Severity:  4 / 10   Modifying Factors: Pain worsens with walking  Associated Signs/Symptoms: edema, erythema, drainage and numbness    Ulcer Identification:  Ulcer Type: venous  Contributing Factors: edema, venous stasis and lymphedema    Diabetic/Pressure/Non Pressure Ulcers onl y:  Ulcer: Non-Pressure ulcer, fat layer exposed    4-16-21  Debrided, cont tx and care, profore lyte with bhavana  If patient has diabetic lower extremity wounds    4-21-21  Debrided, cont tx and care     428-21  Debrided, healing well    5-7-21  Debrided, cont tx and care     5-12-21  Debrided, cont tx and care     519-21  Debrided, cont tx and care    528-21  Debrided, cont tx and care    6-4-21  Debrided, cont tx and care     6--21  Debrided, cont and care    6-16-21  Debrided, cont tx and care    625-21  DEBRIDED, HEALING WELL    7-2-21  Debrided    7-9-21  Debrided, cont tx and care     7-16-21  Debrided, cont tx and care     28-21  Debrided, cont tx and care   Nunez Classification of diabetic lower extremity wounds:    Grade Description   []  0 No open wound   []  1 Superficial ulcer involving the full skin thickness   []  2 Deep ulcer involves ligament, tendon, joint capsule, or fascia  No bone involvement or abscess presence   []  3 Deep Ulcer with abcess formation and/or osteomyelitis   []  4 Localized gangrene   []  5 Extensive gangrene of the foot     Wound: N/A        PAST MEDICAL HISTORY      Diagnosis Date    Arthritis     Bleeding ulcer     Hypertension      Past Surgical History:   Procedure Laterality Date     SECTION      CHOLECYSTECTOMY, LAPAROSCOPIC  2016    ERCP  2016    HYSTERECTOMY      JOINT REPLACEMENT Left     left knee     History reviewed. No pertinent family history. Social History     Tobacco Use    Smoking status: Never Smoker    Smokeless tobacco: Never Used   Vaping Use    Vaping Use: Never used   Substance Use Topics    Alcohol use: No    Drug use: No     No Known Allergies  Current Outpatient Medications on File Prior to Encounter   Medication Sig Dispense Refill    HYDROcodone-acetaminophen (NORCO) 5-325 MG per tablet Take 1 tablet by mouth every 8 hours as needed for Pain.       furosemide (LASIX) 40 MG tablet Take 40 mg by mouth daily      potassium chloride (KLOR-CON) 20 MEQ packet Take 20 mEq by mouth daily      lisinopril-hydroCHLOROthiazide (PRINZIDE;ZESTORETIC) 20-25 MG per tablet Take 1 tablet by mouth daily      acetaminophen (TYLENOL) 500 MG tablet Take 500 mg by mouth every 6 hours as needed for Pain      metoprolol tartrate (LOPRESSOR) 50 MG tablet Take 1 tablet by mouth 2 times daily 60 tablet 3    amLODIPine (NORVASC) 10 MG tablet Take 1 tablet by mouth daily (Patient taking differently: Take 5 mg by mouth daily ) 30 tablet 3    pantoprazole (PROTONIX) 40 MG tablet Take 1 tablet by mouth every morning (before breakfast) 30 tablet 0    vitamin B-12 (CYANOCOBALAMIN) 100 MCG tablet Take 50 mcg by mouth daily      calcium carbonate (OSCAL) 500 MG TABS tablet Take 1,000 mg by mouth daily       No current facility-administered medications on file prior to encounter. REVIEW OF SYSTEMS   ROS : All others Negative if blank [], Positive if [x]  General Vascular   [] Fevers [] Claudication   [] Chills [] Rest Pain   Skin Neurologic   [x] Tissue Loss [x] Lower extremity neuropathy     Objective:    BP (!) 142/80   Pulse 68   Temp 96.8 °F (36 °C) (Temporal)   Resp 20   Ht 5' 4\" (1.626 m)   Wt 240 lb (108.9 kg)   BMI 41.20 kg/m²   Wt Readings from Last 3 Encounters:   07/28/21 240 lb (108.9 kg)   07/16/21 240 lb (108.9 kg)   07/09/21 240 lb (108.9 kg)       PHYSICAL EXAM   CONSTITUTIONAL:   Awake, alert, cooperative   PSYCHIATRIC :  Oriented to time, place and person      normal insight to disease process  EXTREMITIES:   R LE Open wounds are noted   Skin color is abnormal with ulcers   Edema is  noted   Sensation intact to light touch   Palpation of the foot does cause pain   4/5 strength DF/PF  L LE Open wounds are noted   Skin color is abnormal with ulcers   Edema is  noted   Sensation intact to light touch   Palpation of the foot does cause pain   4/5 strength DF/PF  R dorsalis pedis 1 L dorsalis pedis 1   R posterior tibial 1 L posterior tibial 1     Assessment:     Problem List Items Addressed This Visit     Non-pressure chronic ulcer of other part of right lower leg with fat layer exposed ([address]    Relevant Orders    Initiate Outpatient Wound Care Protocol    Non-pressure chronic ulcer of lower leg, left, with fat layer exposed ([address]    Relevant Orders    Initiate Outpatient Wound Care Protocol    Venous stasis ulcer with edema of lower leg ([address] - Primary    Relevant Orders    Initiate Outpatient Wound Care Protocol          Pre Debridement Measurements:  Are located in the [facility]  Documentation Flow Sheet  Post Debridement Measurements:  Wound/Ulcer Descriptions are Pre Debridement except measurements:     Incision 07/08/16 Abdomen Mid;Right;Upper; Outer (Active)   Number of days: 8387 Wound 03/26/21 Leg Left; Lower #2 blocked (Active)   Wound Image   07/28/21 0934   Dressing Status New dressing applied 07/21/21 0956   Wound Cleansed Cleansed with saline 07/21/21 0956   Dressing/Treatment Collagen; Hydrating gel 07/21/21 0956   Offloading for Diabetic Foot Ulcers No 07/21/21 0956   Wound Length (cm) 0 cm 07/28/21 0934   Wound Width (cm) 0 cm 07/28/21 0934   Wound Depth (cm) 0 cm 07/28/21 0934   Wound Surface Area (cm^2) 0 cm^2 07/28/21 0934   Change in Wound Size % (l*w) 100 07/28/21 0934   Wound Volume (cm^3) 0 cm^3 07/28/21 0934   Wound Healing % 100 07/28/21 0934   Post-Procedure Length (cm) 1 cm 07/16/21 1019   Post-Procedure Width (cm) 1.1 cm 07/16/21 1019   Post-Procedure Depth (cm) 0.2 cm 07/09/21 0924   Post-Procedure Surface Area (cm^2) 1.1 cm^2 07/16/21 1019   Post-Procedure Volume (cm^3) 1.092 cm^3 07/09/21 0924   Wound Assessment Pink/red 07/21/21 0840   Drainage Amount Scant 07/21/21 0840   Drainage Description Serosanguinous 07/21/21 0840   Odor None 07/21/21 0840   Ying-wound Assessment Intact 07/21/21 0840   Number of days: 124          Procedure Note  Indications:  Based on my examination of this patient's wound(s)/ulcer(s) today, debridement is required to promote healing and evaluate the wound base. Performed by: Doris Rivera DPM    Consent obtained:  Yes    Time out taken:  Yes    Pain Control: Anesthetic  Anesthetic: None     Debridement:Excisional Debridement    Using #15 blade scalpel the wound(s)/ulcer(s) was/were sharply debrided down through and including the removal of subcutaneous tissue.         Devitalized Tissue Debrided:  fibrin, biofilm, slough and necrotic/eschar to stimulate bleeding to promote healing, post debridement good bleeding base and wound edges noted    Wound/Ulcer #: 1 and 2    Percent of Wound/Ulcer Debrided: 100%    Total Surface Area Debrided:  0 sq cm     Estimated Blood Loss:  Minimal  Hemostasis Achieved:  by pressure    Procedural Pain:  3  / 10   Post Procedural Pain:  4 / 10     Response to treatment:  Well tolerated by patient. A culture was done. Healed     Plan:     Pt is not a smoker   - Discussed relationship of smoking and negative affects on wound healing   - Emphasized importance of tobacco avoidace/cessation   - Script for nicotine patch given to patient   - Information regarding support groups for smoking cessation given    In my professional opinion and based off the information that is available at this time this patient has appropriate indication for HBO Therapy: Maybe    Treatment Note please see attached Discharge Instructions    Written patient dismissal instructions given to patient and signed by patient or POA. Discharge Instructions       Visit Discharge/Physician Orders    Discharge condition: Stable     Assessment of pain at discharge:  none     Anesthetic used: 4% lidocaine solutiion     Discharge to: Home     Left via:Private automobile     Accompanied by: accompanied by grand daughter     ECF/HHA: WILL START [facility] ([facility]) AT [facility]  Dressing Orders:  TO RIGHT LEG, APPLY GEL CAST AND COBAN. To remaining left leg wounds, apply COLLAGEN, WITH HYDROGEL, AND GEL CAST AND COBAN for 7 days. Do not allow leg wrap to become wet.    pad left lateral  foot well with foam     Treatment Orders: elevate legs when seated. Wear SPANDAGRIP right leg during day. Ordering compression hose from [facility] followup visit __________1 WEEK, WEDNESDAY________  (Please note your next appointment above and if you are unable to keep, kindly give a 24 hour notice.  Thank you.)    Physician signature:__________________________      If you experience any of the following, please call the [facility] [address] during business hours:    * Increase in Pain  * Temperature over 101  * Increase in drainage from your wound  * Drainage with a foul odor  * Bleeding  * Increase in swelling  * Need for compression bandage changes due to slippage, breakthrough drainage. If you need medical attention outside of the business hours of the [address] [place] [address] Road please contact your PCP or go to the nearest emergency room.         Electronically signed by Dequan Haas DPM on 7/28/2021 at 9:54 AM

## 2021-07-28 NOTE — PLAN OF CARE
Problem: Wound:  Goal: Will show signs of wound healing; wound closure and no evidence of infection  Description: Will show signs of wound healing; wound closure and no evidence of infection  Outcome: Completed     Problem: Venous:  Goal: Signs of wound healing will improve  Description: Signs of wound healing will improve  Outcome: Completed     Problem: Compression therapy:  Goal: Will be free from complications associated with compression therapy  Description: Will be free from complications associated with compression therapy  Outcome: Completed     Problem: Falls - Risk of:  Goal: Will remain free from falls  Description: Will remain free from falls  Outcome: Completed

## 2022-03-03 ENCOUNTER — HOSPITAL ENCOUNTER (INPATIENT)
Age: 72
LOS: 2 days | Discharge: HOME | DRG: 177 | End: 2022-03-05
Attending: STUDENT IN AN ORGANIZED HEALTH CARE EDUCATION/TRAINING PROGRAM | Admitting: INTERNAL MEDICINE
Payer: MEDICARE

## 2022-03-03 ENCOUNTER — APPOINTMENT (OUTPATIENT)
Dept: GENERAL RADIOLOGY | Age: 72
DRG: 177 | End: 2022-03-03
Payer: MEDICARE

## 2022-03-03 ENCOUNTER — APPOINTMENT (OUTPATIENT)
Dept: CT IMAGING | Age: 72
DRG: 177 | End: 2022-03-03
Payer: MEDICARE

## 2022-03-03 DIAGNOSIS — R09.02 HYPOXIA: ICD-10-CM

## 2022-03-03 DIAGNOSIS — U07.1 COVID: Primary | ICD-10-CM

## 2022-03-03 DIAGNOSIS — E87.6 HYPOKALEMIA: ICD-10-CM

## 2022-03-03 DIAGNOSIS — N39.0 URINARY TRACT INFECTION WITHOUT HEMATURIA, SITE UNSPECIFIED: ICD-10-CM

## 2022-03-03 PROBLEM — J96.01 ACUTE HYPOXEMIC RESPIRATORY FAILURE DUE TO COVID-19 (HCC): Status: ACTIVE | Noted: 2022-03-03

## 2022-03-03 LAB
ALBUMIN SERPL-MCNC: 3.9 G/DL (ref 3.5–5.2)
ALP BLD-CCNC: 143 U/L (ref 35–104)
ALT SERPL-CCNC: 9 U/L (ref 0–32)
ANION GAP SERPL CALCULATED.3IONS-SCNC: 12 MMOL/L (ref 7–16)
ANISOCYTOSIS: ABNORMAL
AST SERPL-CCNC: 19 U/L (ref 0–31)
BACTERIA: ABNORMAL /HPF
BASOPHILS ABSOLUTE: 0 E9/L (ref 0–0.2)
BASOPHILS RELATIVE PERCENT: 0.3 % (ref 0–2)
BILIRUB SERPL-MCNC: 0.6 MG/DL (ref 0–1.2)
BILIRUBIN URINE: NEGATIVE
BLOOD, URINE: ABNORMAL
BUN BLDV-MCNC: 20 MG/DL (ref 6–23)
CALCIUM SERPL-MCNC: 8.3 MG/DL (ref 8.6–10.2)
CHLORIDE BLD-SCNC: 99 MMOL/L (ref 98–107)
CLARITY: CLEAR
CO2: 26 MMOL/L (ref 22–29)
COLOR: YELLOW
CREAT SERPL-MCNC: 1 MG/DL (ref 0.5–1)
EKG ATRIAL RATE: 71 BPM
EKG P AXIS: 60 DEGREES
EKG P-R INTERVAL: 174 MS
EKG Q-T INTERVAL: 410 MS
EKG QRS DURATION: 92 MS
EKG QTC CALCULATION (BAZETT): 445 MS
EKG R AXIS: 94 DEGREES
EKG T AXIS: 44 DEGREES
EKG VENTRICULAR RATE: 71 BPM
EOSINOPHILS ABSOLUTE: 0.06 E9/L (ref 0.05–0.5)
EOSINOPHILS RELATIVE PERCENT: 1.7 % (ref 0–6)
EPITHELIAL CELLS, UA: ABNORMAL /HPF
GFR AFRICAN AMERICAN: >60
GFR NON-AFRICAN AMERICAN: 54 ML/MIN/1.73
GLUCOSE BLD-MCNC: 105 MG/DL (ref 74–99)
GLUCOSE URINE: NEGATIVE MG/DL
HCT VFR BLD CALC: 40.8 % (ref 34–48)
HEMOGLOBIN: 12.4 G/DL (ref 11.5–15.5)
KETONES, URINE: ABNORMAL MG/DL
LACTIC ACID, SEPSIS: 0.9 MMOL/L (ref 0.5–1.9)
LACTIC ACID, SEPSIS: 0.9 MMOL/L (ref 0.5–1.9)
LEUKOCYTE ESTERASE, URINE: ABNORMAL
LYMPHOCYTES ABSOLUTE: 0.49 E9/L (ref 1.5–4)
LYMPHOCYTES RELATIVE PERCENT: 13.1 % (ref 20–42)
MAGNESIUM: 2 MG/DL (ref 1.6–2.6)
MCH RBC QN AUTO: 26.3 PG (ref 26–35)
MCHC RBC AUTO-ENTMCNC: 30.4 % (ref 32–34.5)
MCV RBC AUTO: 86.4 FL (ref 80–99.9)
MONOCYTES ABSOLUTE: 0.15 E9/L (ref 0.1–0.95)
MONOCYTES RELATIVE PERCENT: 3.5 % (ref 2–12)
NEUTROPHILS ABSOLUTE: 3.12 E9/L (ref 1.8–7.3)
NEUTROPHILS RELATIVE PERCENT: 81.7 % (ref 43–80)
NITRITE, URINE: POSITIVE
OVALOCYTES: ABNORMAL
PDW BLD-RTO: 16.4 FL (ref 11.5–15)
PH UA: 6 (ref 5–9)
PLATELET # BLD: 165 E9/L (ref 130–450)
PMV BLD AUTO: 10.3 FL (ref 7–12)
POIKILOCYTES: ABNORMAL
POTASSIUM REFLEX MAGNESIUM: 3.2 MMOL/L (ref 3.5–5)
PRO-BNP: 148 PG/ML (ref 0–125)
PROCALCITONIN: 0.16 NG/ML (ref 0–0.08)
PROTEIN UA: NEGATIVE MG/DL
RBC # BLD: 4.72 E12/L (ref 3.5–5.5)
RBC UA: ABNORMAL /HPF (ref 0–2)
SARS-COV-2, NAAT: DETECTED
SODIUM BLD-SCNC: 137 MMOL/L (ref 132–146)
SPECIFIC GRAVITY UA: <=1.005 (ref 1–1.03)
TOTAL PROTEIN: 7 G/DL (ref 6.4–8.3)
TROPONIN, HIGH SENSITIVITY: 26 NG/L (ref 0–9)
TROPONIN, HIGH SENSITIVITY: 32 NG/L (ref 0–9)
UROBILINOGEN, URINE: 0.2 E.U./DL
WBC # BLD: 3.8 E9/L (ref 4.5–11.5)
WBC UA: ABNORMAL /HPF (ref 0–5)

## 2022-03-03 PROCEDURE — 83605 ASSAY OF LACTIC ACID: CPT

## 2022-03-03 PROCEDURE — 84484 ASSAY OF TROPONIN QUANT: CPT

## 2022-03-03 PROCEDURE — 85025 COMPLETE CBC W/AUTO DIFF WBC: CPT

## 2022-03-03 PROCEDURE — 81001 URINALYSIS AUTO W/SCOPE: CPT

## 2022-03-03 PROCEDURE — 2140000000 HC CCU INTERMEDIATE R&B

## 2022-03-03 PROCEDURE — 99284 EMERGENCY DEPT VISIT MOD MDM: CPT

## 2022-03-03 PROCEDURE — 6360000002 HC RX W HCPCS: Performed by: STUDENT IN AN ORGANIZED HEALTH CARE EDUCATION/TRAINING PROGRAM

## 2022-03-03 PROCEDURE — 6370000000 HC RX 637 (ALT 250 FOR IP): Performed by: STUDENT IN AN ORGANIZED HEALTH CARE EDUCATION/TRAINING PROGRAM

## 2022-03-03 PROCEDURE — 96374 THER/PROPH/DIAG INJ IV PUSH: CPT

## 2022-03-03 PROCEDURE — 71045 X-RAY EXAM CHEST 1 VIEW: CPT

## 2022-03-03 PROCEDURE — 83735 ASSAY OF MAGNESIUM: CPT

## 2022-03-03 PROCEDURE — G0378 HOSPITAL OBSERVATION PER HR: HCPCS

## 2022-03-03 PROCEDURE — 80053 COMPREHEN METABOLIC PANEL: CPT

## 2022-03-03 PROCEDURE — 87040 BLOOD CULTURE FOR BACTERIA: CPT

## 2022-03-03 PROCEDURE — 6360000002 HC RX W HCPCS: Performed by: NURSE PRACTITIONER

## 2022-03-03 PROCEDURE — 6360000004 HC RX CONTRAST MEDICATION: Performed by: RADIOLOGY

## 2022-03-03 PROCEDURE — 93005 ELECTROCARDIOGRAM TRACING: CPT | Performed by: STUDENT IN AN ORGANIZED HEALTH CARE EDUCATION/TRAINING PROGRAM

## 2022-03-03 PROCEDURE — 83880 ASSAY OF NATRIURETIC PEPTIDE: CPT

## 2022-03-03 PROCEDURE — 6370000000 HC RX 637 (ALT 250 FOR IP): Performed by: NURSE PRACTITIONER

## 2022-03-03 PROCEDURE — 71275 CT ANGIOGRAPHY CHEST: CPT

## 2022-03-03 PROCEDURE — 87635 SARS-COV-2 COVID-19 AMP PRB: CPT

## 2022-03-03 PROCEDURE — 96372 THER/PROPH/DIAG INJ SC/IM: CPT

## 2022-03-03 PROCEDURE — 84145 PROCALCITONIN (PCT): CPT

## 2022-03-03 RX ORDER — SODIUM CHLORIDE 0.9 % (FLUSH) 0.9 %
10 SYRINGE (ML) INJECTION
Status: DISPENSED | OUTPATIENT
Start: 2022-03-03 | End: 2022-03-03

## 2022-03-03 RX ORDER — METOPROLOL TARTRATE 50 MG/1
50 TABLET, FILM COATED ORAL 2 TIMES DAILY
Status: DISCONTINUED | OUTPATIENT
Start: 2022-03-03 | End: 2022-03-04

## 2022-03-03 RX ORDER — POTASSIUM CHLORIDE 20 MEQ/1
40 TABLET, EXTENDED RELEASE ORAL ONCE
Status: COMPLETED | OUTPATIENT
Start: 2022-03-03 | End: 2022-03-03

## 2022-03-03 RX ORDER — DEXAMETHASONE SODIUM PHOSPHATE 10 MG/ML
6 INJECTION INTRAMUSCULAR; INTRAVENOUS ONCE
Status: COMPLETED | OUTPATIENT
Start: 2022-03-03 | End: 2022-03-03

## 2022-03-03 RX ORDER — LISINOPRIL 20 MG/1
20 TABLET ORAL DAILY
Status: DISCONTINUED | OUTPATIENT
Start: 2022-03-03 | End: 2022-03-05 | Stop reason: HOSPADM

## 2022-03-03 RX ORDER — UBIDECARENONE 75 MG
50 CAPSULE ORAL DAILY
Status: DISCONTINUED | OUTPATIENT
Start: 2022-03-03 | End: 2022-03-05 | Stop reason: HOSPADM

## 2022-03-03 RX ORDER — ALLOPURINOL 100 MG/1
300 TABLET ORAL DAILY
Status: DISCONTINUED | OUTPATIENT
Start: 2022-03-03 | End: 2022-03-05 | Stop reason: HOSPADM

## 2022-03-03 RX ORDER — LISINOPRIL AND HYDROCHLOROTHIAZIDE 25; 20 MG/1; MG/1
1 TABLET ORAL DAILY
Status: DISCONTINUED | OUTPATIENT
Start: 2022-03-03 | End: 2022-03-03

## 2022-03-03 RX ORDER — HYDROCHLOROTHIAZIDE 25 MG/1
25 TABLET ORAL DAILY
Status: DISCONTINUED | OUTPATIENT
Start: 2022-03-03 | End: 2022-03-05 | Stop reason: HOSPADM

## 2022-03-03 RX ORDER — AMLODIPINE BESYLATE 5 MG/1
5 TABLET ORAL DAILY
Status: DISCONTINUED | OUTPATIENT
Start: 2022-03-03 | End: 2022-03-05 | Stop reason: HOSPADM

## 2022-03-03 RX ORDER — PANTOPRAZOLE SODIUM 40 MG/1
40 TABLET, DELAYED RELEASE ORAL
Status: DISCONTINUED | OUTPATIENT
Start: 2022-03-04 | End: 2022-03-05 | Stop reason: HOSPADM

## 2022-03-03 RX ORDER — ALLOPURINOL 300 MG/1
TABLET ORAL
COMMUNITY
Start: 2021-12-08

## 2022-03-03 RX ORDER — DEXAMETHASONE SODIUM PHOSPHATE 10 MG/ML
6 INJECTION INTRAMUSCULAR; INTRAVENOUS EVERY 24 HOURS
Status: DISCONTINUED | OUTPATIENT
Start: 2022-03-03 | End: 2022-03-05 | Stop reason: HOSPADM

## 2022-03-03 RX ORDER — CEFDINIR 300 MG/1
300 CAPSULE ORAL ONCE
Status: COMPLETED | OUTPATIENT
Start: 2022-03-03 | End: 2022-03-03

## 2022-03-03 RX ADMIN — CEFDINIR 300 MG: 300 CAPSULE ORAL at 14:30

## 2022-03-03 RX ADMIN — Medication 50 MCG: at 20:03

## 2022-03-03 RX ADMIN — HYDROCHLOROTHIAZIDE 25 MG: 25 TABLET ORAL at 20:03

## 2022-03-03 RX ADMIN — IOPAMIDOL 60 ML: 755 INJECTION, SOLUTION INTRAVENOUS at 13:02

## 2022-03-03 RX ADMIN — ENOXAPARIN SODIUM 40 MG: 100 INJECTION SUBCUTANEOUS at 20:04

## 2022-03-03 RX ADMIN — AMLODIPINE BESYLATE 5 MG: 5 TABLET ORAL at 20:02

## 2022-03-03 RX ADMIN — DEXAMETHASONE SODIUM PHOSPHATE 6 MG: 10 INJECTION INTRAMUSCULAR; INTRAVENOUS at 11:25

## 2022-03-03 RX ADMIN — LISINOPRIL 20 MG: 20 TABLET ORAL at 20:03

## 2022-03-03 RX ADMIN — POTASSIUM CHLORIDE 40 MEQ: 20 TABLET, EXTENDED RELEASE ORAL at 13:23

## 2022-03-03 RX ADMIN — ALLOPURINOL 300 MG: 100 TABLET ORAL at 20:02

## 2022-03-03 ASSESSMENT — ENCOUNTER SYMPTOMS
SHORTNESS OF BREATH: 0
VOMITING: 0
DIARRHEA: 0
ABDOMINAL DISTENTION: 0
PHOTOPHOBIA: 0
COUGH: 0
NAUSEA: 0
ABDOMINAL PAIN: 0
CHEST TIGHTNESS: 0

## 2022-03-03 ASSESSMENT — PAIN SCALES - GENERAL
PAINLEVEL_OUTOF10: 0
PAINLEVEL_OUTOF10: 0

## 2022-03-03 NOTE — ED PROVIDER NOTES
Evangelista Alvarez is a 66year old female with PMH of arthritis and HTN who presents emergency department concern for shortness of breath. Patient has had Covid-like symptoms have been present for 7 to 8 days and are worsening. Patient's family members were also having Covid symptoms. Patient is vaccinated and was vaccinated with the last 4 months. Patient has not tried any for symptoms other make symptoms that are worse symptoms are moderate severity constant. Patient found be hypoxic at home and was 87% on room air in triage. Patient does not have any complaints currently patient is not on home oxygen. Patient denies any history of VTE. Patient is on any anticoagulation. The history is provided by the patient, medical records and a relative. Review of Systems   Constitutional: Positive for fever. Negative for chills, diaphoresis and fatigue. Eyes: Negative for photophobia and visual disturbance. Respiratory: Negative for cough, chest tightness and shortness of breath. Cardiovascular: Negative for chest pain, palpitations and leg swelling. Gastrointestinal: Negative for abdominal distention, abdominal pain, diarrhea, nausea and vomiting. Genitourinary: Negative for dysuria. Musculoskeletal: Negative for neck pain and neck stiffness. Skin: Negative for pallor and rash. Neurological: Negative for headaches. Psychiatric/Behavioral: Negative for confusion. Physical Exam  Vitals and nursing note reviewed. Constitutional:       General: She is not in acute distress. Appearance: She is ill-appearing. Comments: Chronically ill in appearance   HENT:      Head: Normocephalic and atraumatic. Eyes:      General: No scleral icterus. Conjunctiva/sclera: Conjunctivae normal.      Pupils: Pupils are equal, round, and reactive to light. Cardiovascular:      Rate and Rhythm: Normal rate and regular rhythm.    Pulmonary:      Effort: Pulmonary effort is normal. Breath sounds: Normal breath sounds. Abdominal:      General: Bowel sounds are normal. There is no distension. Palpations: Abdomen is soft. Tenderness: There is no abdominal tenderness. There is no guarding or rebound. Musculoskeletal:      Cervical back: Normal range of motion and neck supple. No rigidity. No muscular tenderness. Right lower leg: Edema present. Left lower leg: Edema present. Comments: Trace lower extremity edema   Skin:     General: Skin is warm and dry. Capillary Refill: Capillary refill takes less than 2 seconds. Coloration: Skin is not pale. Findings: No erythema or rash. Neurological:      Mental Status: She is alert and oriented to person, place, and time. Psychiatric:         Mood and Affect: Mood normal.          Procedures     MDM  Number of Diagnoses or Management Options  COVID  Hypokalemia  Hypoxia  Urinary tract infection without hematuria, site unspecified  Diagnosis management comments: Rosa M Salvador is a 77-year-old female present emergency department concern for Covid. Patient was hypoxic at 87% at time of arrival to the ED patient was stable on 4 L nasal cannula oxygen. Patient was given Decadron   Patient also found to have UTI given omnicef, patient stable on NC oxygen at time of re-evaluation  CTA negative for PE  Patient had stable down trending troponin without chest pain  Patient will be admitted for hypoxia secondary to covid. Patient is on day 7/8 of symptoms.    Patient is vaccinated but has not had booster       ED Course as of 03/03/22 1800   Thu Mar 03, 2022   1411 Patient accepted by [facility] for admission [SS]      ED Course User Index  [SS] Ashok Lima MD     ED Course as of 03/03/22 1800   Thu Mar 03, 2022   1411 Patient accepted by [facility] for admission [SS]      ED Course User Index  [SS] Ashok Lima MD       --------------------------------------------- PAST HISTORY ---------------------------------------------  Past Medical History:  has a past medical history of Arthritis, Bleeding ulcer, and Hypertension. Past Surgical History:  has a past surgical history that includes joint replacement (Left);  section; Hysterectomy; ERCP (2016); and Cholecystectomy, laparoscopic (2016). Social History:  reports that she has never smoked. She has never used smokeless tobacco. She reports that she does not drink alcohol and does not use drugs. Family History: family history is not on file. The patients home medications have been reviewed. Allergies: Patient has no known allergies.     -------------------------------------------------- RESULTS -------------------------------------------------    LABS:  Results for orders placed or performed during the hospital encounter of 22   COVID-19, Rapid    Specimen: Nasopharyngeal Swab   Result Value Ref Range    SARS-CoV-2, NAAT DETECTED (A) Not Detected   CBC with Auto Differential   Result Value Ref Range    WBC 3.8 (L) 4.5 - 11.5 E9/L    RBC 4.72 3.50 - 5.50 E12/L    Hemoglobin 12.4 11.5 - 15.5 g/dL    Hematocrit 40.8 34.0 - 48.0 %    MCV 86.4 80.0 - 99.9 fL    MCH 26.3 26.0 - 35.0 pg    MCHC 30.4 (L) 32.0 - 34.5 %    RDW 16.4 (H) 11.5 - 15.0 fL    Platelets 398 258 - 235 E9/L    MPV 10.3 7.0 - 12.0 fL    Neutrophils % 81.7 (H) 43.0 - 80.0 %    Lymphocytes % 13.1 (L) 20.0 - 42.0 %    Monocytes % 3.5 2.0 - 12.0 %    Eosinophils % 1.7 0.0 - 6.0 %    Basophils % 0.3 0.0 - 2.0 %    Neutrophils Absolute 3.12 1.80 - 7.30 E9/L    Lymphocytes Absolute 0.49 (L) 1.50 - 4.00 E9/L    Monocytes Absolute 0.15 0.10 - 0.95 E9/L    Eosinophils Absolute 0.06 0.05 - 0.50 E9/L    Basophils Absolute 0.00 0.00 - 0.20 E9/L    Anisocytosis 1+     Poikilocytes 1+     Ovalocytes 1+    Comprehensive Metabolic Panel w/ Reflex to MG   Result Value Ref Range    Sodium 137 132 - 146 mmol/L    Potassium reflex Magnesium 3.2 (L) 3.5 - 5.0 mmol/L    Chloride 99 98 - 107 mmol/L    CO2 26 22 - 29 mmol/L    Anion Gap 12 7 - 16 mmol/L    Glucose 105 (H) 74 - 99 mg/dL    BUN 20 6 - 23 mg/dL    CREATININE 1.0 0.5 - 1.0 mg/dL    GFR Non-African American 54 >=60 mL/min/1.73    GFR African American >60     Calcium 8.3 (L) 8.6 - 10.2 mg/dL    Total Protein 7.0 6.4 - 8.3 g/dL    Albumin 3.9 3.5 - 5.2 g/dL    Total Bilirubin 0.6 0.0 - 1.2 mg/dL    Alkaline Phosphatase 143 (H) 35 - 104 U/L    ALT 9 0 - 32 U/L    AST 19 0 - 31 U/L   Troponin   Result Value Ref Range    Troponin, High Sensitivity 32 (H) 0 - 9 ng/L   Brain Natriuretic Peptide   Result Value Ref Range    Pro- (H) 0 - 125 pg/mL   Lactate, Sepsis   Result Value Ref Range    Lactic Acid, Sepsis 0.9 0.5 - 1.9 mmol/L   Lactate, Sepsis   Result Value Ref Range    Lactic Acid, Sepsis 0.9 0.5 - 1.9 mmol/L   Urinalysis with Microscopic   Result Value Ref Range    Color, UA Yellow Straw/Yellow    Clarity, UA Clear Clear    Glucose, Ur Negative Negative mg/dL    Bilirubin Urine Negative Negative    Ketones, Urine TRACE (A) Negative mg/dL    Specific Gravity, UA <=1.005 1.005 - 1.030    Blood, Urine TRACE-INTACT Negative    pH, UA 6.0 5.0 - 9.0    Protein, UA Negative Negative mg/dL    Urobilinogen, Urine 0.2 <2.0 E.U./dL    Nitrite, Urine POSITIVE (A) Negative    Leukocyte Esterase, Urine SMALL (A) Negative    WBC, UA 5-10 (A) 0 - 5 /HPF    RBC, UA 0-1 0 - 2 /HPF    Epithelial Cells, UA MODERATE /HPF    Bacteria, UA FEW (A) None Seen /HPF   Magnesium   Result Value Ref Range    Magnesium 2.0 1.6 - 2.6 mg/dL   Procalcitonin   Result Value Ref Range    Procalcitonin 0.16 (H) 0.00 - 0.08 ng/mL   Troponin   Result Value Ref Range    Troponin, High Sensitivity 26 (H) 0 - 9 ng/L   EKG 12 Lead   Result Value Ref Range    Ventricular Rate 71 BPM    Atrial Rate 71 BPM    P-R Interval 174 ms    QRS Duration 92 ms    Q-T Interval 410 ms    QTc Calculation (Bazett) 445 ms    P Axis 60 degrees    R Axis 94 degrees regarding the diagnosis and prognosis. Their questions are answered at this time and they are agreeable with the plan of admission.    --------------------------------- ADDITIONAL PROVIDER NOTES ---------------------------------  Consultations:  Spoke with [facility] Discussed case. They will admit the patient. This patient's ED course included: a personal history and physicial examination, re-evaluation prior to disposition, multiple bedside re-evaluations, IV medications, cardiac monitoring, continuous pulse oximetry and complex medical decision making and emergency management    This patient has remained hemodynamically stable during their ED course. Diagnosis:  1. COVID    2. Hypoxia    3. Hypokalemia    4. Urinary tract infection without hematuria, site unspecified        Disposition:  Patient's disposition: Admit to telemetry  Patient's condition is stable.             Susana Renteria MD  03/03/22 1800

## 2022-03-04 PROBLEM — B34.9 ACUTE BRONCHOSPASM DUE TO VIRAL INFECTION: Status: ACTIVE | Noted: 2022-03-04

## 2022-03-04 PROBLEM — E66.01 MORBID OBESITY WITH BMI OF 45.0-49.9, ADULT (HCC): Status: ACTIVE | Noted: 2022-03-04

## 2022-03-04 PROBLEM — J98.01 ACUTE BRONCHOSPASM DUE TO VIRAL INFECTION: Status: ACTIVE | Noted: 2022-03-04

## 2022-03-04 PROBLEM — R82.90 ABNORMAL URINALYSIS: Status: ACTIVE | Noted: 2022-03-04

## 2022-03-04 LAB
ALBUMIN SERPL-MCNC: 3.6 G/DL (ref 3.5–5.2)
ALP BLD-CCNC: 137 U/L (ref 35–104)
ALT SERPL-CCNC: 8 U/L (ref 0–32)
ANION GAP SERPL CALCULATED.3IONS-SCNC: 16 MMOL/L (ref 7–16)
ANISOCYTOSIS: ABNORMAL
AST SERPL-CCNC: 16 U/L (ref 0–31)
BACTERIA: ABNORMAL /HPF
BASOPHILS ABSOLUTE: 0 E9/L (ref 0–0.2)
BASOPHILS RELATIVE PERCENT: 0 % (ref 0–2)
BILIRUB SERPL-MCNC: 0.3 MG/DL (ref 0–1.2)
BILIRUBIN URINE: NEGATIVE
BLOOD, URINE: NEGATIVE
BUN BLDV-MCNC: 23 MG/DL (ref 6–23)
C-REACTIVE PROTEIN: 6.9 MG/DL (ref 0–0.4)
CALCIUM SERPL-MCNC: 8.5 MG/DL (ref 8.6–10.2)
CHLORIDE BLD-SCNC: 101 MMOL/L (ref 98–107)
CLARITY: CLEAR
CO2: 23 MMOL/L (ref 22–29)
COLOR: YELLOW
CREAT SERPL-MCNC: 0.8 MG/DL (ref 0.5–1)
D DIMER: 541 NG/ML DDU
EOSINOPHILS ABSOLUTE: 0 E9/L (ref 0.05–0.5)
EOSINOPHILS RELATIVE PERCENT: 0 % (ref 0–6)
FIBRINOGEN: >700 MG/DL (ref 225–540)
GFR AFRICAN AMERICAN: >60
GFR NON-AFRICAN AMERICAN: >60 ML/MIN/1.73
GLUCOSE BLD-MCNC: 141 MG/DL (ref 74–99)
GLUCOSE URINE: NEGATIVE MG/DL
HCT VFR BLD CALC: 39.2 % (ref 34–48)
HEMOGLOBIN: 12.1 G/DL (ref 11.5–15.5)
IMMATURE GRANULOCYTES #: 0.03 E9/L
IMMATURE GRANULOCYTES %: 1 % (ref 0–5)
KETONES, URINE: ABNORMAL MG/DL
LEUKOCYTE ESTERASE, URINE: ABNORMAL
LYMPHOCYTES ABSOLUTE: 0.25 E9/L (ref 1.5–4)
LYMPHOCYTES RELATIVE PERCENT: 8.7 % (ref 20–42)
MCH RBC QN AUTO: 26.7 PG (ref 26–35)
MCHC RBC AUTO-ENTMCNC: 30.9 % (ref 32–34.5)
MCV RBC AUTO: 86.3 FL (ref 80–99.9)
MONOCYTES ABSOLUTE: 0.18 E9/L (ref 0.1–0.95)
MONOCYTES RELATIVE PERCENT: 6.2 % (ref 2–12)
NEUTROPHILS ABSOLUTE: 2.43 E9/L (ref 1.8–7.3)
NEUTROPHILS RELATIVE PERCENT: 84.1 % (ref 43–80)
NITRITE, URINE: POSITIVE
OVALOCYTES: ABNORMAL
PDW BLD-RTO: 16.1 FL (ref 11.5–15)
PH UA: 6 (ref 5–9)
PLATELET # BLD: 169 E9/L (ref 130–450)
PMV BLD AUTO: 10.6 FL (ref 7–12)
POIKILOCYTES: ABNORMAL
POTASSIUM REFLEX MAGNESIUM: 3.6 MMOL/L (ref 3.5–5)
PROCALCITONIN: 0.13 NG/ML (ref 0–0.08)
PROTEIN UA: NEGATIVE MG/DL
RBC # BLD: 4.54 E12/L (ref 3.5–5.5)
RBC UA: ABNORMAL /HPF (ref 0–2)
SODIUM BLD-SCNC: 140 MMOL/L (ref 132–146)
SPECIFIC GRAVITY UA: 1.02 (ref 1–1.03)
TOTAL PROTEIN: 6.7 G/DL (ref 6.4–8.3)
UROBILINOGEN, URINE: 0.2 E.U./DL
WBC # BLD: 2.9 E9/L (ref 4.5–11.5)
WBC UA: ABNORMAL /HPF (ref 0–5)

## 2022-03-04 PROCEDURE — G0378 HOSPITAL OBSERVATION PER HR: HCPCS

## 2022-03-04 PROCEDURE — 97535 SELF CARE MNGMENT TRAINING: CPT

## 2022-03-04 PROCEDURE — 85378 FIBRIN DEGRADE SEMIQUANT: CPT

## 2022-03-04 PROCEDURE — 97166 OT EVAL MOD COMPLEX 45 MIN: CPT

## 2022-03-04 PROCEDURE — 97161 PT EVAL LOW COMPLEX 20 MIN: CPT

## 2022-03-04 PROCEDURE — 96372 THER/PROPH/DIAG INJ SC/IM: CPT

## 2022-03-04 PROCEDURE — 87088 URINE BACTERIA CULTURE: CPT

## 2022-03-04 PROCEDURE — 6370000000 HC RX 637 (ALT 250 FOR IP): Performed by: NURSE PRACTITIONER

## 2022-03-04 PROCEDURE — 6360000002 HC RX W HCPCS: Performed by: NURSE PRACTITIONER

## 2022-03-04 PROCEDURE — 6370000000 HC RX 637 (ALT 250 FOR IP): Performed by: INTERNAL MEDICINE

## 2022-03-04 PROCEDURE — 94664 DEMO&/EVAL PT USE INHALER: CPT

## 2022-03-04 PROCEDURE — 85025 COMPLETE CBC W/AUTO DIFF WBC: CPT

## 2022-03-04 PROCEDURE — 94640 AIRWAY INHALATION TREATMENT: CPT

## 2022-03-04 PROCEDURE — 36415 COLL VENOUS BLD VENIPUNCTURE: CPT

## 2022-03-04 PROCEDURE — 80053 COMPREHEN METABOLIC PANEL: CPT

## 2022-03-04 PROCEDURE — 84145 PROCALCITONIN (PCT): CPT

## 2022-03-04 PROCEDURE — 85384 FIBRINOGEN ACTIVITY: CPT

## 2022-03-04 PROCEDURE — 2140000000 HC CCU INTERMEDIATE R&B

## 2022-03-04 PROCEDURE — 86140 C-REACTIVE PROTEIN: CPT

## 2022-03-04 PROCEDURE — 96376 TX/PRO/DX INJ SAME DRUG ADON: CPT

## 2022-03-04 PROCEDURE — 51701 INSERT BLADDER CATHETER: CPT

## 2022-03-04 PROCEDURE — 81001 URINALYSIS AUTO W/SCOPE: CPT

## 2022-03-04 PROCEDURE — 97530 THERAPEUTIC ACTIVITIES: CPT

## 2022-03-04 RX ORDER — IPRATROPIUM BROMIDE AND ALBUTEROL SULFATE 2.5; .5 MG/3ML; MG/3ML
1 SOLUTION RESPIRATORY (INHALATION)
Status: DISCONTINUED | OUTPATIENT
Start: 2022-03-04 | End: 2022-03-05 | Stop reason: HOSPADM

## 2022-03-04 RX ORDER — AMLODIPINE BESYLATE 5 MG/1
5 TABLET ORAL DAILY
COMMUNITY

## 2022-03-04 RX ADMIN — IPRATROPIUM BROMIDE AND ALBUTEROL SULFATE 1 AMPULE: 2.5; .5 SOLUTION RESPIRATORY (INHALATION) at 16:38

## 2022-03-04 RX ADMIN — ENOXAPARIN SODIUM 40 MG: 100 INJECTION SUBCUTANEOUS at 20:07

## 2022-03-04 RX ADMIN — LISINOPRIL 20 MG: 20 TABLET ORAL at 08:26

## 2022-03-04 RX ADMIN — ALLOPURINOL 300 MG: 100 TABLET ORAL at 08:26

## 2022-03-04 RX ADMIN — AMLODIPINE BESYLATE 5 MG: 5 TABLET ORAL at 08:26

## 2022-03-04 RX ADMIN — POTASSIUM BICARBONATE 20 MEQ: 782 TABLET, EFFERVESCENT ORAL at 08:26

## 2022-03-04 RX ADMIN — ENOXAPARIN SODIUM 40 MG: 100 INJECTION SUBCUTANEOUS at 08:26

## 2022-03-04 RX ADMIN — Medication 50 MCG: at 08:27

## 2022-03-04 RX ADMIN — IPRATROPIUM BROMIDE AND ALBUTEROL SULFATE 1 AMPULE: 2.5; .5 SOLUTION RESPIRATORY (INHALATION) at 21:09

## 2022-03-04 RX ADMIN — HYDROCHLOROTHIAZIDE 25 MG: 25 TABLET ORAL at 08:27

## 2022-03-04 RX ADMIN — DEXAMETHASONE SODIUM PHOSPHATE 6 MG: 10 INJECTION INTRAMUSCULAR; INTRAVENOUS at 18:39

## 2022-03-04 ASSESSMENT — PAIN SCALES - GENERAL
PAINLEVEL_OUTOF10: 0

## 2022-03-04 NOTE — PROGRESS NOTES
Therapeutic Exercises:    BLE AROM in chair   BUE AROM in chair     Patient education  Pt educated on PT role, safety during functional mobility    Patient response to education:   Pt verbalized understanding Pt demonstrated skill Pt requires further education in this area   Yes  Yes  no     ASSESSMENT:    Conditions Requiring Skilled Therapeutic Intervention:    [x]Decreased strength     []Decreased ROM  [x]Decreased functional mobility  [x]Decreased balance   [x]Decreased endurance   []Decreased posture  []Decreased sensation  []Decreased coordination   []Decreased vision  []Decreased safety awareness   []Increased pain       Comments:  Pt received supine and agreeable to PT evaluation. Vitals monitored during session. Pt pleasant and agreeable. Required some assistance of trunk during supine>sit. Pt able to stand and ambulate short distance to chair. Performed LE AROM and UE AROM while seated in chair. Politely declined further ambulation. Pt left seated in chair and given call button with instructions to call for assist back to bed. FWW left in room. Treatment:  Patient practiced and was instructed in the following treatment:     Bed mobility training - pt given verbal and tactile cues to facilitate proper sequencing and safety during supine>sit   STS and pivot transfer training - pt educated on proper hand and foot placement, safety and sequencing, and use of FWW to safely complete sit<>stand and pivot transfers with hands on assistance to complete task safely     Gait training- pt was given verbal and tactile cues to facilitate safety/balance during ambulation as well as provided with physical assistance. Pt's/ family goals   1. Home with family assist     Prognosis is good for reaching above PT goals. Patient and or family understand(s) diagnosis, prognosis, and plan of care.   yes    PHYSICAL THERAPY PLAN OF CARE:    PT POC is established based on physician order and patient diagnosis     Referring provider/PT Order:    03/03/22 1830  PT eval and treat  Start:  03/03/22 1830,   End:  03/03/22 1830,   ONE TIME,   Standing Count:  1 Occurrences,   R         Yosophie Shepherd, DESMOND - CNP       Diagnosis:  Hypokalemia [E87.6]  Hypoxia [R09.02]  Urinary tract infection without hematuria, site unspecified [N39.0]  Acute hypoxemic respiratory failure due to COVID-19 (HCC) [U07.1, J96.01]  COVID [U07.1]  Specific instructions for next treatment:  Progress gait, increase activity     Current Treatment Recommendations:     [x] Strengthening to improve independence with functional mobility   [] ROM to improve independence with functional mobility   [x] Balance Training to improve static/dynamic balance and to reduce fall risk  [x] Endurance Training to improve activity tolerance during functional mobility   [x] Transfer Training to improve safety and independence with all functional transfers   [x] Gait Training to improve gait mechanics, endurance and asses need for appropriate assistive device  [x] Stair Training in preparation for safe discharge home and/or into the community   [x] Positioning to prevent skin breakdown and contractures  [x] Safety and Education Training   [x] Patient/Caregiver Education   [x] HEP  [] Other     PT long term treatment goals are located in above grid    Frequency of treatments: 2-5x/week x 1-2 weeks. Time in  1230  Time out  1250    Total Treatment Time  10 minutes     Evaluation Time includes thorough review of current medical information, gathering information on past medical history/social history and prior level of function, completion of standardized testing/informal observation of tasks, assessment of data and education on plan of care and goals.     CPT codes:  [x] Low Complexity PT evaluation 14044  [] Moderate Complexity PT evaluation 80185  [] High Complexity PT evaluation 42379  [] PT Re-evaluation 26834  [] Gait training 46549 -- minutes  [] Manual therapy 49047 -- minutes  [x] Therapeutic activities 01552 10 minutes  [] Therapeutic exercises 85898 - minutes  [] Neuromuscular reeducation 57227 -- minutes     Theo Morgan PT, DPT  [id number]

## 2022-03-04 NOTE — PROGRESS NOTES
[address]      Date:3/4/2022                                                  Patient Name: Jamar Roldan  MRN: [id number]  : 1950  Room: [address] [place] [address]    Evaluating OT: Rocio Duque, [address] [place], OTR/L [id number]  Referring DESMOND Christensen CNP  Specific Provider Orders: OT eval and treat 3/3  Recommended Adaptive Equipment:  TBD     Diagnosis: Covid   Surgery: none   Pertinent Medical History: HTN   Precautions:  Fall Risk, Droplet +    Assessment of current deficits   [x] Functional mobility  [x]ADLs  [x] Strength               [x]Cognition   [x] Functional transfers   [x] IADLs         [x] Safety Awareness   [x]Endurance   [] Fine Coordination              [x] Balance      [] Vision/perception   [x]Sensation    []Gross Motor Coordination  [] ROM  [] Delirium                   [] Motor Control     OT PLAN OF CARE   OT POC based on physician orders, patient diagnosis and results of clinical assessment    Frequency/Duration: 2-3 days/wk for 2 weeks PRN   Specific OT Treatment to include:   * Instruction/training on adapted ADL techniques and AE recommendations to increase functional independence within precautions       * Training on energy conservation strategies, correct breathing pattern and techniques to improve independence/tolerance for self-care routine  * Functional transfer/mobility training/DME recommendations for increased independence, safety, and fall prevention  * Patient/Family education to increase follow through with safety techniques and functional independence  * Recommendation of environmental modifications for increased safety with functional transfers/mobility and ADLs  * Therapeutic exercise to improve motor endurance, ROM, and functional strength for ADLs/functional transfers  * Therapeutic activities to facilitate/challenge dynamic balance, stand tolerance for increased safety and independence with ADLs  * Neuro-muscular re-education: facilitation of righting/equilibrium reactions, midline orientation, scapular stability/mobility, normalization of muscle tone, and facilitation of volitional active controled movement    Home Living: Pt lives with family in a 1 story home; bed/bath on main level   Bathroom setup: tub shower unit   Equipment owned: shower chair, cane, w/c  Prior Level of Function: assist with ADLs/IADLs; using ww for functional mobility   Driving: no    Pain Level: 0/10  Cognition: A&O: 3/4; Follows 1 step directions, with repetition and increased time   Memory:  fair    Sequencing:  fair    Problem solving:  fair    Judgement/safety:  fair     Functional Assessment:  AM-PAC Daily Activity Raw Score: 14/24   Initial Eval Status  Date: 3/4/22 Treatment Status  Date: STGs=LTGs  Time Frame: 10-14 days   Feeding SUP   IND   Grooming SBA (seated in chair)   SUP   UB Dressing Mod A (due to limited ROM)   Min A   LB Dressing Max A (assist with socks)  Mod A    Bathing Mod A (simulated)  Min A    Toileting Mod A  Min A   Bed Mobility  Log roll: NT  Supine to sit: NT   Sit to supine: NT   Log roll SUP  Supine to sit: SUP   Sit to supine: SUP   Functional Transfers Sit to stand:Min A   Stand to sit:Min A  BSC: Min A  SBA   Functional Mobility Min A (using ww, to/from <household distance)  SBA   Balance Sitting: SBA  Standing: Min A     Activity Tolerance fair     Visual/  Perceptual Glasses: reading                   UE ROM: BUE: elbow flex WFL, shoulder flex grossly 100'  Strength: RUE: grossly 3+/5 LUE: grossly 3+/5   Strength: B WFL  Fine Motor Coordination:  WFL     Hearing: WFL  Sensation:  No c/o numbness/tingling   Tone:  WFL  Edema: BLEs                            Comments:Cleared by RN to see pt. Upon arrival, patient sitting in chair and agreeable to OT session.  At end of session, patient sitting in chair with call light and phone within reach, all lines and tubes intact. Pt would benefit from continued OT to increase functional independence and quality of life. Treatment:  Pt required vc's and physical assist for proper technique/safety with hand placement/body mechanics/posture for bed mobility/ADLs/functional tranfers/mobility/ww management. Pt required vc's for sequencing/initiation of ADLs/functional transfers. Pt became slightly short of breath with light activity. Pt required rest breaks during session. Pt appeared to have tolerated session well and appears motivated/cooperative/pleasant . Pt instructed on use of call light for assistance and fall prevention. Pt demo'ing fair understanding of education provided. Continue to educate. Eval Complexity: moderate  · History: Expanded chart review of medical records and additional review of physical, cognitive, or psychosocial history related to current functional performance  · Exam: 3+ performance deficits  · Assistance/Modification: Min/mod assistance or modifications required to perform tasks. May have comorbidities that affect occupational performance. Rehab Potential: Good for established goals, pt. assisted in establishment of goals. LTG: maximize independence with ADLs to return to PLOF    Patient instructed on diagnosis, prognosis/goals and plan of care. Demonstrated fair understanding. [] Malnutrition indicators have been identified and nursing has been notified to ensure a dietitian consult is ordered. Evaluation time includes thorough review of current medical information, gathering information on past medical & social history & PLOF, completion of standardized testing, informal observation of tasks, consultation with other medical professions/disciplines, assessment of data & development of POC/goals.      Time In: 1:00       Time Out: 1:25     Total treatment time: 15       Treatment Charges: Mins Units   OT Eval Low 28926 X    OT Eval Medium 90315     OT Eval High 10541     OT Re-Eval [id number]

## 2022-03-04 NOTE — H&P
Progress Note      Patient Name: Sandro De Anda   Patient ID: [id number]   Sex: Male   YOB: 1953    Primary Care Provider: Patsy MAS   Referring Provider: Pee Tilley MD    Visit Date: June 19, 2020    Provider: Pee Tilley MD   [facility]   Location Address: [address] [place] [address]  [id number]   Location Phone: [phone]          Chief Complaint  · urologic issues      History Of Present Illness  TELEHEALTH TELEPHONE VISIT  Sandro De Anda is a 66 year old /White male who is presenting for evaluation via telehealth telephone visit. Verbal consent obtained before beginning visit.   Provider spent 8 minutes with the patient during the telehealth visit.   The following staff were present during this visit: Jerson Puente   Past Medical History/ Overview of Patient Symptoms     66-year-old  gentleman who is coming in today for BPH with lower urinary tract symptoms.    5/18/2020  [facility]    3/20 cystoscopy2 cm prostate, large bladder with some very shallow diverticulum's and some mild trabeculations.    Some dysuria which is improving.  Some frequency and urgency.  Has some burning with frequency.     Patient was on Flomax and finasteride in the past.  He is off these currently.    PVR    1/19  80    2-3 caffeinated beverages daily.    Previous    No history of kidney stone.    Brother with prostate cancer    No cardiopulmonary history.  Patient does not smoke.  Baby ASA     Getting prostate cancer screening through PCP.    PSA    2/19  0.19       Past Medical History  Hemorrhoids; Sciatic pain; Seasonal allergies; Urinary Retention         Past Surgical History  Shunt placement; Vasectomy         Medication List  aspirin 81 mg oral tablet,delayed release (DR/EC); Daily Multi-Vitamin oral tablet; Dulcolax (bisacodyl) 5 mg oral tablet,delayed release (DR/EC); finasteride 5 mg oral tablet; ibuprofen 200 mg oral tablet; tamsulosin 0.4 mg  [id number]  [id number]  History and Physical      CHIEF COMPLAINT:    Chief Complaint   Patient presents with    Concern For COVID-19     SOB since  was 88% on RA 97on 3L        Patient of Libia Amador  presents with:  Acute hypoxemic respiratory failure due to COVID-19 [facility])    History of Present Illness:   Patient reports about a week of low-grade fevers and a mild cough. She denies shortness of breath. No diarrhea nausea or vomiting. No sick contacts. No other associated symptoms. Today she is feeling quite normal.  She is very wheezy on examination though denies any history of asthma or emphysema. She has been vaccinated for Covid. REVIEW OF SYSTEMS:  Pertinent negatives are above in HPI. 10 point ROS otherwise negative. Past Medical History:   Diagnosis Date    Arthritis     Bleeding ulcer     Hypertension          Past Surgical History:   Procedure Laterality Date     SECTION      CHOLECYSTECTOMY, LAPAROSCOPIC  2016    ERCP  2016    HYSTERECTOMY      JOINT REPLACEMENT Left     left knee       Medications Prior to Admission:    Medications Prior to Admission: allopurinol (ZYLOPRIM) 300 MG tablet, Take by mouth  furosemide (LASIX) 40 MG tablet, Take 40 mg by mouth daily Indications: taking as needed   potassium chloride (KLOR-CON) 20 MEQ packet, Take 20 mEq by mouth daily Indications: taking as needed   lisinopril-hydroCHLOROthiazide (PRINZIDE;ZESTORETIC) 20-25 MG per tablet, Take 1 tablet by mouth daily  amLODIPine (NORVASC) 10 MG tablet, Take 1 tablet by mouth daily (Patient taking differently: Take 5 mg by mouth daily )  vitamin B-12 (CYANOCOBALAMIN) 100 MCG tablet, Take 50 mcg by mouth daily  HYDROcodone-acetaminophen (NORCO) 5-325 MG per tablet, Take 1 tablet by mouth every 8 hours as needed for Pain.   acetaminophen (TYLENOL) 500 MG tablet, Take 500 mg by mouth every 6 hours as needed for Pain  metoprolol tartrate (LOPRESSOR) 50 MG tablet, Take 1 tablet by mouth 2 times daily  pantoprazole (PROTONIX) 40 MG tablet, Take 1 tablet by mouth every morning (before breakfast)  calcium carbonate (OSCAL) 500 MG TABS tablet, Take 1,000 mg by mouth daily    Note that the patient's home medications were reviewed and the above list is accurate to the best of my knowledge at the time of the exam.    Allergies:    Patient has no known allergies. Social History:    reports that she has never smoked. She has never used smokeless tobacco. She reports that she does not drink alcohol and does not use drugs. Family History:   No fhx asthma      PHYSICAL EXAM:    Vitals:  BP (!) 122/51   Pulse 66   Temp 98.2 °F (36.8 °C)   Resp 20   Ht 5' (1.524 m)   Wt 230 lb (104.3 kg)   SpO2 99%   BMI 44.92 kg/m²       General appearance: NAD, conversant  Eyes: Sclerae anicteric, PERRLA  HEENT: AT/NC, MMM  Neck: FROM, supple, no thyromegaly  Lymph: No cervical / supraclavicular lymphadenopathy  Lungs: Diffuse b/l wheezing, WOB normal  CV: RRR, no MRGs, no lower extremity edema  Abdomen: Soft, non-tender; no masses or HSM, +BS  Extremities: FROM without synovitis. No clubbing or cyanosis of the hands. Skin: no rash, induration, lesions, or ulcers  Psych: Calm and cooperative. Normal judgement and insight. Normal mood and affect. Neuro: Alert and interactive, face symmetric, speech fluent. LABS:  All labs reviewed.   Of note:  CBC with Differential:    Lab Results   Component Value Date    WBC 2.9 03/04/2022    RBC 4.54 03/04/2022    HGB 12.1 03/04/2022    HCT 39.2 03/04/2022     03/04/2022    MCV 86.3 03/04/2022    MCH 26.7 03/04/2022    MCHC 30.9 03/04/2022    RDW 16.1 03/04/2022    LYMPHOPCT 13.1 03/03/2022    MONOPCT 3.5 03/03/2022    BASOPCT 0.3 03/03/2022    MONOSABS 0.15 03/03/2022    LYMPHSABS 0.49 03/03/2022    EOSABS 0.06 03/03/2022    BASOSABS 0.00 03/03/2022     CMP:    Lab Results   Component Value Date     03/03/2022    K 3.2 03/03/2022    CL oral capsule         Allergy List  NO KNOWN DRUG ALLERGIES         Family Medical History  Stroke         Social History  Alcohol; ; Tobacco (Former)         Immunizations  Name Date Admin   Influenza    Influenza    Influenza          Review of Systems  · Constitutional  o Denies  o : chills, fever  · Gastrointestinal  o Denies  o : nausea, vomiting              Assessment  · Benign prostatic hyperplasia with weak urinary stream       Benign prostatic hyperplasia with lower urinary tract symptoms     600.01/N40.1  Poor urinary stream     600.01/R39.12  · Erectile dysfunction, unspecified erectile dysfunction type     607.84/N52.9  · Prostate cancer screening     V76.44/Z12.5    Problems Reconciled  Plan  · Orders  o Physican Telephone evaluation, 5-10 min (62740) - 600.01/N40.1, 600.01/R39.12, 607.84/N52.9 - 06/19/2020  · Medications  o Medications have been Reconciled  o Transition of Care or Provider Policy  · Instructions  o Plan Of Care:   o Electronically Identified Patient Education Materials Provided Electronically       off Flomax and finasteride    he will come in in 3 months for PVR             Electronically Signed by: Pee Tilley MD -Author on June 19, 2020 09:19:00 AM   99 03/03/2022    CO2 26 03/03/2022    BUN 20 03/03/2022    CREATININE 1.0 03/03/2022    GFRAA >60 03/03/2022    LABGLOM 54 03/03/2022    GLUCOSE 105 03/03/2022    PROT 7.0 03/03/2022    LABALBU 3.9 03/03/2022    CALCIUM 8.3 03/03/2022    BILITOT 0.6 03/03/2022    ALKPHOS 143 03/03/2022    AST 19 03/03/2022    ALT 9 03/03/2022       Imaging:  I've personally reviewed the patient's CT chest  Mild b/l GGO's  Her lungs are totally overwhelmed by her body      EKG:  I've personally reviewed the patient's EKG:  NSR no acute ischemic changes       ASSESSMENT/PLAN:  Principal Problem:    Acute hypoxemic respiratory failure due to COVID-19 [facility])  Active Problems:    Hypokalemia    Morbid obesity with BMI of 45.0-49.9, adult (HCC)    Abnormal urinalysis    Acute bronchospasm due to viral infection  Resolved Problems:    * No resolved hospital problems. *      Currently on room air but very wheezy    Dex    Duonebs    Check CRP    COVID itself is mild but she is so obese her lungs are being crushed    Recheck UA as straight cath, the ED UA is full of squams and NOT diagnostic of UTI. Already exposed to abx though.      DVT prophylaxis: enoxaparin  Code status: full  Requires inpatient level of care  Thiago Correia MD    7:31 AM  3/4/2022

## 2022-03-05 VITALS
DIASTOLIC BLOOD PRESSURE: 63 MMHG | RESPIRATION RATE: 19 BRPM | SYSTOLIC BLOOD PRESSURE: 146 MMHG | OXYGEN SATURATION: 96 % | HEIGHT: 60 IN | TEMPERATURE: 97.3 F | HEART RATE: 75 BPM | BODY MASS INDEX: 45.16 KG/M2 | WEIGHT: 230 LBS

## 2022-03-05 LAB
ALBUMIN SERPL-MCNC: 3.6 G/DL (ref 3.5–5.2)
ALP BLD-CCNC: 122 U/L (ref 35–104)
ALT SERPL-CCNC: 9 U/L (ref 0–32)
ANION GAP SERPL CALCULATED.3IONS-SCNC: 11 MMOL/L (ref 7–16)
ANISOCYTOSIS: ABNORMAL
AST SERPL-CCNC: 15 U/L (ref 0–31)
BASOPHILS ABSOLUTE: 0 E9/L (ref 0–0.2)
BASOPHILS RELATIVE PERCENT: 0 % (ref 0–2)
BILIRUB SERPL-MCNC: 0.3 MG/DL (ref 0–1.2)
BUN BLDV-MCNC: 28 MG/DL (ref 6–23)
C-REACTIVE PROTEIN: 3.3 MG/DL (ref 0–0.4)
CALCIUM SERPL-MCNC: 8.5 MG/DL (ref 8.6–10.2)
CHLORIDE BLD-SCNC: 107 MMOL/L (ref 98–107)
CO2: 24 MMOL/L (ref 22–29)
CREAT SERPL-MCNC: 0.8 MG/DL (ref 0.5–1)
EOSINOPHILS ABSOLUTE: 0 E9/L (ref 0.05–0.5)
EOSINOPHILS RELATIVE PERCENT: 0 % (ref 0–6)
GFR AFRICAN AMERICAN: >60
GFR NON-AFRICAN AMERICAN: >60 ML/MIN/1.73
GLUCOSE BLD-MCNC: 174 MG/DL (ref 74–99)
HCT VFR BLD CALC: 37.3 % (ref 34–48)
HEMOGLOBIN: 11.4 G/DL (ref 11.5–15.5)
IMMATURE GRANULOCYTES #: 0.03 E9/L
IMMATURE GRANULOCYTES %: 0.6 % (ref 0–5)
LYMPHOCYTES ABSOLUTE: 0.21 E9/L (ref 1.5–4)
LYMPHOCYTES RELATIVE PERCENT: 4.3 % (ref 20–42)
MCH RBC QN AUTO: 26.2 PG (ref 26–35)
MCHC RBC AUTO-ENTMCNC: 30.6 % (ref 32–34.5)
MCV RBC AUTO: 85.7 FL (ref 80–99.9)
MONOCYTES ABSOLUTE: 0.21 E9/L (ref 0.1–0.95)
MONOCYTES RELATIVE PERCENT: 4.3 % (ref 2–12)
NEUTROPHILS ABSOLUTE: 4.42 E9/L (ref 1.8–7.3)
NEUTROPHILS RELATIVE PERCENT: 90.8 % (ref 43–80)
OVALOCYTES: ABNORMAL
PDW BLD-RTO: 16.1 FL (ref 11.5–15)
PLATELET # BLD: 176 E9/L (ref 130–450)
PMV BLD AUTO: 10.4 FL (ref 7–12)
POIKILOCYTES: ABNORMAL
POLYCHROMASIA: ABNORMAL
POTASSIUM REFLEX MAGNESIUM: 3.8 MMOL/L (ref 3.5–5)
RBC # BLD: 4.35 E12/L (ref 3.5–5.5)
SODIUM BLD-SCNC: 142 MMOL/L (ref 132–146)
TOTAL PROTEIN: 6.7 G/DL (ref 6.4–8.3)
WBC # BLD: 4.9 E9/L (ref 4.5–11.5)

## 2022-03-05 PROCEDURE — 80053 COMPREHEN METABOLIC PANEL: CPT

## 2022-03-05 PROCEDURE — 94640 AIRWAY INHALATION TREATMENT: CPT

## 2022-03-05 PROCEDURE — 6370000000 HC RX 637 (ALT 250 FOR IP): Performed by: NURSE PRACTITIONER

## 2022-03-05 PROCEDURE — 36415 COLL VENOUS BLD VENIPUNCTURE: CPT

## 2022-03-05 PROCEDURE — 85025 COMPLETE CBC W/AUTO DIFF WBC: CPT

## 2022-03-05 PROCEDURE — G0378 HOSPITAL OBSERVATION PER HR: HCPCS

## 2022-03-05 PROCEDURE — 96372 THER/PROPH/DIAG INJ SC/IM: CPT

## 2022-03-05 PROCEDURE — 6360000002 HC RX W HCPCS: Performed by: NURSE PRACTITIONER

## 2022-03-05 PROCEDURE — 86140 C-REACTIVE PROTEIN: CPT

## 2022-03-05 PROCEDURE — 6370000000 HC RX 637 (ALT 250 FOR IP): Performed by: INTERNAL MEDICINE

## 2022-03-05 RX ORDER — ALBUTEROL SULFATE 90 UG/1
AEROSOL, METERED RESPIRATORY (INHALATION)
Qty: 18 G | Refills: 1 | Status: SHIPPED | OUTPATIENT
Start: 2022-03-05

## 2022-03-05 RX ORDER — DEXAMETHASONE 6 MG/1
6 TABLET ORAL
Qty: 4 TABLET | Refills: 0 | Status: SHIPPED | OUTPATIENT
Start: 2022-03-05 | End: 2022-03-09

## 2022-03-05 RX ADMIN — ENOXAPARIN SODIUM 40 MG: 100 INJECTION SUBCUTANEOUS at 10:18

## 2022-03-05 RX ADMIN — AMLODIPINE BESYLATE 5 MG: 5 TABLET ORAL at 10:20

## 2022-03-05 RX ADMIN — POTASSIUM BICARBONATE 20 MEQ: 782 TABLET, EFFERVESCENT ORAL at 10:20

## 2022-03-05 RX ADMIN — IPRATROPIUM BROMIDE AND ALBUTEROL SULFATE 1 AMPULE: 2.5; .5 SOLUTION RESPIRATORY (INHALATION) at 10:50

## 2022-03-05 RX ADMIN — LISINOPRIL 20 MG: 20 TABLET ORAL at 10:19

## 2022-03-05 RX ADMIN — HYDROCHLOROTHIAZIDE 25 MG: 25 TABLET ORAL at 10:19

## 2022-03-05 RX ADMIN — ALLOPURINOL 300 MG: 100 TABLET ORAL at 10:19

## 2022-03-05 RX ADMIN — Medication 50 MCG: at 10:19

## 2022-03-05 RX ADMIN — PANTOPRAZOLE SODIUM 40 MG: 40 TABLET, DELAYED RELEASE ORAL at 05:08

## 2022-03-05 ASSESSMENT — PAIN SCALES - WONG BAKER
WONGBAKER_NUMERICALRESPONSE: 0
WONGBAKER_NUMERICALRESPONSE: 0

## 2022-03-05 ASSESSMENT — PAIN SCALES - GENERAL: PAINLEVEL_OUTOF10: 0

## 2022-03-05 NOTE — DISCHARGE SUMMARY
Physician Discharge Summary     Patient ID:  Maya Paiz  [id number]  83 y.o.  1950    Admit date: 3/3/2022    Discharge date and time:  3/5/2022     Admission Diagnoses:   Chief Complaint   Patient presents with    Concern For COVID-19     SOB since 2-22 was 88% on RA 97on 3L      Acute hypoxemic respiratory failure due to COVID-19 [facility])     Discharge Diagnoses:   Principal Problem:    Acute hypoxemic respiratory failure due to COVID-19 [facility])  Active Problems:    Hypokalemia    Morbid obesity with BMI of 45.0-49.9, adult (HCC)    Abnormal urinalysis    Acute bronchospasm due to viral infection  Resolved Problems:    * No resolved hospital problems. *       Consults: none    Procedures: none    Hospital Course:   Patient presented with COVID-19 infection. She has very faint infiltrates on her chest CT but her main issue is wheezing and bronchitis from the infection. She was treated with bronchodilators and dexamethasone. She was very briefly noted to be hypoxemic but here in the hospital she has had a normal O2 sat this whole time. She has presented quite late in her course so I do not think there is any use for remdesivir. She is only mildly ill. She needs to lose weight badly. Her chest CT shows that her lungs are basically being crushed by an immense amount of fatty tissue.  Despite all this she is breathing very comfortably, afebrile, minimal cough, and she is medically stable for discharge     Discharge Exam:  Vitals:    03/04/22 1456 03/04/22 1949 03/05/22 0439 03/05/22 1016   BP: (!) 142/61 (!) 114/38 (!) 150/66 (!) 146/63   Pulse: 65 74 70 75   Resp: 18 18 19   Temp: 98.2 °F (36.8 °C) 98.4 °F (36.9 °C)  97.3 °F (36.3 °C)   TempSrc: Oral Oral  Infrared   SpO2: 97% 93% 95% 96%   Weight:       Height:            General appearance: NAD, conversant, totally comfortable/nontoxic appearing, very obese  HEENT: AT/NC, MMM  Neck: FROM, supple  Lungs: Mild b/l wheezing, WOB normal  CV: RRR, no MRGs  Abdomen: Soft, non-tender; no masses or HSM, +BS  Extremities: No peripheral edema or digital cyanosis  Skin: no rash, lesions or ulcers  Psych: Calm and cooperative  Neuro: Alert and interactive, nonfocal     Condition:  Stable    Disposition: home    Patient Instructions:   Current Discharge Medication List      START taking these medications    Details   dexamethasone (DECADRON) 6 MG tablet Take 1 tablet by mouth daily (with breakfast) for 4 days  Qty: 4 tablet, Refills: 0      albuterol sulfate HFA (VENTOLIN HFA) 108 (90 Base) MCG/ACT inhaler Please use 4 times daily scheduled for first 3-4 days after discharge, and then every 4 hours as needed for wheezing afterwards. USE WITH SPACER. Pharmacy: Please dispense with spacer. Qty: 18 g, Refills: 1         CONTINUE these medications which have NOT CHANGED    Details   amLODIPine (NORVASC) 5 MG tablet Take 5 mg by mouth daily      allopurinol (ZYLOPRIM) 300 MG tablet Take by mouth      furosemide (LASIX) 40 MG tablet Take 40 mg by mouth daily Indications: taking as needed       potassium chloride (KLOR-CON) 20 MEQ packet Take 20 mEq by mouth daily Indications: taking as needed       lisinopril-hydroCHLOROthiazide (PRINZIDE;ZESTORETIC) 20-25 MG per tablet Take 1 tablet by mouth daily      vitamin B-12 (CYANOCOBALAMIN) 100 MCG tablet Take 50 mcg by mouth daily      HYDROcodone-acetaminophen (NORCO) 5-325 MG per tablet Take 1 tablet by mouth every 8 hours as needed for Pain.       acetaminophen (TYLENOL) 500 MG tablet Take 500 mg by mouth every 6 hours as needed for Pain      pantoprazole (PROTONIX) 40 MG tablet Take 1 tablet by mouth every morning (before breakfast)  Qty: 30 tablet, Refills: 0      calcium carbonate (OSCAL) 500 MG TABS tablet Take 1,000 mg by mouth daily         STOP taking these medications       metoprolol tartrate (LOPRESSOR) 50 MG tablet Comments:   Reason for Stopping:             Activity: activity as tolerated  Diet: regular diet    Follow-up with PCP in 1 week.     Note that over 30 minutes was spent in preparing discharge papers, discussing discharge with patient, medication review, etc.    Signed:  Kenyon Andrews MD    3/5/2022  10:55 AM

## 2022-03-06 LAB — URINE CULTURE, ROUTINE: NORMAL

## 2022-03-07 ENCOUNTER — CARE COORDINATION (OUTPATIENT)
Dept: CASE MANAGEMENT | Age: 72
End: 2022-03-07

## 2022-03-07 NOTE — CARE COORDINATION
[address] Transitions Initial Follow Up Call    Call within 2 business days of discharge: Yes    Patient: Jamar Roldan Patient : 1950   MRN: [id number]  Reason for Admission: COVID-19  Discharge Date: 3/5/22 RARS: Readmission Risk Score: 10.4 ( )      Last Discharge [facility]       Complaint Diagnosis Description Type Department Provider    3/3/22 Concern For COVID-19 COVID . Isela Lam . ED to Hosp-Admission (Discharged) (ADMITTED) SEY[facility] 6WE Teola Leyden, MD; Nemesio Montaño. .. Attempted to reach the patient for initial [address] Transition call post hospital discharge. Message left with CTN's contact information requesting return phone call. Will attempt outreach again. Follow Up  No future appointments.     Monica Ye RN Last EKG 8/8/2018  Echo 6/28/19  Please review and advise, thanks

## 2022-03-08 ENCOUNTER — CARE COORDINATION (OUTPATIENT)
Dept: CASE MANAGEMENT | Age: 72
End: 2022-03-08

## 2022-03-08 LAB
BLOOD CULTURE, ROUTINE: NORMAL
CULTURE, BLOOD 2: NORMAL

## 2022-03-16 NOTE — PROGRESS NOTES
Physician Progress Note      PATIENT:               Fany Little  CSN #:                  [id number]  :                       1950  ADMIT DATE:       3/3/2022 9:55 AM  DISCH DATE:        3/5/2022 1:50 PM  RESPONDING  PROVIDER #:        Ondina Reddy MD          QUERY TEXT:    Pt admitted with COVID. Pt noted to have WBC 3.8/2.9, procal .16/.13, and   acute respiratory failure. If possible, please document in the progress notes   and discharge summary if you are evaluating and /or treating any of the   following: The medical record reflects the following:  Risk Factors: COVID, acute respiratory failure  Clinical Indicators: covid positive 3/3, WBC 3.8/2.9, procal .16/.13,   afebrile, HR 66-76, 88% on RA  Treatment: omnicef, decadisabel MASCORRO, RN, CDS  [phone]  Options provided:  -- Sepsis, present on admission  -- COVID 19 without Sepsis  -- Other - I will add my own diagnosis  -- Disagree - Not applicable / Not valid  -- Disagree - Clinically unable to determine / Unknown  -- Refer to Clinical Documentation Reviewer    PROVIDER RESPONSE TEXT:    Provider disagreed with this query.     Query created by: Abiodun Marks on 3/10/2022 1:19 PM      Electronically signed by:  Ondina Reddy MD 3/16/2022 8:26 AM

## 2022-11-03 NOTE — PLAN OF CARE
Patient called and information given.  She plans on going to the [place] location and will call and schedule.     Deb Arreola RN     Problem: Pain:  Goal: Pain level will decrease  Description: Pain level will decrease  Outcome: Ongoing  Goal: Control of acute pain  Description: Control of acute pain  Outcome: Ongoing  Goal: Control of chronic pain  Description: Control of chronic pain  Outcome: Ongoing     Problem: Wound:  Goal: Will show signs of wound healing; wound closure and no evidence of infection  Description: Will show signs of wound healing; wound closure and no evidence of infection  Outcome: Ongoing     Problem: Arterial:  Goal: Optimize blood flow for wound healing  Description: Optimize blood flow for wound healing  Outcome: Ongoing     Problem: Venous:  Goal: Signs of wound healing will improve  Description: Signs of wound healing will improve  Outcome: Ongoing     Problem: Compression therapy:  Goal: Will be free from complications associated with compression therapy  Description: Will be free from complications associated with compression therapy  Outcome: Ongoing     Problem: Falls - Risk of:  Goal: Will remain free from falls  Description: Will remain free from falls  Outcome: Ongoing     Problem: Pain:  Goal: Pain level will decrease  Description: Pain level will decrease  Outcome: Ongoing  Goal: Control of acute pain  Description: Control of acute pain  Outcome: Ongoing  Goal: Control of chronic pain  Description: Control of chronic pain  Outcome: Ongoing

## 2023-08-23 ENCOUNTER — APPOINTMENT (OUTPATIENT)
Dept: GENERAL RADIOLOGY | Age: 73
End: 2023-08-23
Payer: MEDICARE

## 2023-08-23 ENCOUNTER — APPOINTMENT (OUTPATIENT)
Dept: ULTRASOUND IMAGING | Age: 73
End: 2023-08-23
Payer: MEDICARE

## 2023-08-23 ENCOUNTER — HOSPITAL ENCOUNTER (EMERGENCY)
Age: 73
Discharge: HOME | End: 2023-08-23
Payer: MEDICARE

## 2023-08-23 VITALS
HEART RATE: 77 BPM | OXYGEN SATURATION: 95 % | BODY MASS INDEX: 45.31 KG/M2 | WEIGHT: 232 LBS | RESPIRATION RATE: 16 BRPM | DIASTOLIC BLOOD PRESSURE: 71 MMHG | SYSTOLIC BLOOD PRESSURE: 146 MMHG | TEMPERATURE: 98.2 F
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
## 2023-08-23 DIAGNOSIS — M25.561 ACUTE PAIN OF RIGHT KNEE: ICD-10-CM

## 2023-08-23 DIAGNOSIS — M71.21 BAKER'S CYST OF KNEE, RIGHT: ICD-10-CM

## 2023-08-23 PROCEDURE — 6370000000 HC RX 637 (ALT 250 FOR IP): Performed by: NURSE PRACTITIONER

## 2023-08-23 PROCEDURE — 73560 X-RAY EXAM OF KNEE 1 OR 2: CPT

## 2023-08-23 PROCEDURE — 93971 EXTREMITY STUDY: CPT

## 2023-08-23 PROCEDURE — 99284 EMERGENCY DEPT VISIT MOD MDM: CPT

## 2023-08-23 RX ORDER — OXYCODONE HYDROCHLORIDE AND ACETAMINOPHEN 5; 325 MG/1; MG/1
1 TABLET ORAL ONCE
Status: COMPLETED | OUTPATIENT
Start: 2023-08-23 | End: 2023-08-23

## 2023-08-23 RX ADMIN — OXYCODONE AND ACETAMINOPHEN 1 TABLET: 5; 325 TABLET ORAL at 12:02

## 2023-08-23 ASSESSMENT — PAIN DESCRIPTION - LOCATION: LOCATION: KNEE

## 2023-08-23 ASSESSMENT — PAIN - FUNCTIONAL ASSESSMENT: PAIN_FUNCTIONAL_ASSESSMENT: 0-10

## 2023-08-23 ASSESSMENT — PAIN SCALES - GENERAL
PAINLEVEL_OUTOF10: 10
PAINLEVEL_OUTOF10: 10

## 2023-08-23 ASSESSMENT — PAIN DESCRIPTION - DESCRIPTORS: DESCRIPTORS: ACHING;DISCOMFORT;SORE;THROBBING

## 2023-08-23 ASSESSMENT — PAIN DESCRIPTION - ORIENTATION: ORIENTATION: RIGHT

## 2023-08-23 ASSESSMENT — LIFESTYLE VARIABLES
HOW OFTEN DO YOU HAVE A DRINK CONTAINING ALCOHOL: NEVER
HOW MANY STANDARD DRINKS CONTAINING ALCOHOL DO YOU HAVE ON A TYPICAL DAY: PATIENT DOES NOT DRINK

## 2023-08-23 ASSESSMENT — PAIN DESCRIPTION - PAIN TYPE: TYPE: ACUTE PAIN

## 2023-08-23 NOTE — DISCHARGE INSTRUCTIONS
There are no fractures noted on the x-ray, no evidence of a DVT on the ultrasound. She does have a Baker's cyst which is likely due to osteoarthritis behind the knee which may be causing some of the pain. Please follow-up with orthopedics as listed below. She may continue her home dose of Percocet, Voltaren gel 4 times daily as needed for pain.

## 2025-02-06 ENCOUNTER — APPOINTMENT (OUTPATIENT)
Age: 75
DRG: 176 | End: 2025-02-06
Attending: INTERNAL MEDICINE
Payer: MEDICARE

## 2025-02-06 ENCOUNTER — APPOINTMENT (OUTPATIENT)
Dept: ULTRASOUND IMAGING | Age: 75
DRG: 176 | End: 2025-02-06
Payer: MEDICARE

## 2025-02-06 ENCOUNTER — HOSPITAL ENCOUNTER (INPATIENT)
Age: 75
LOS: 4 days | Discharge: HOME | DRG: 176 | End: 2025-02-10
Attending: STUDENT IN AN ORGANIZED HEALTH CARE EDUCATION/TRAINING PROGRAM | Admitting: STUDENT IN AN ORGANIZED HEALTH CARE EDUCATION/TRAINING PROGRAM
Payer: MEDICARE

## 2025-02-06 ENCOUNTER — APPOINTMENT (OUTPATIENT)
Dept: CT IMAGING | Age: 75
DRG: 176 | End: 2025-02-06
Payer: MEDICARE

## 2025-02-06 ENCOUNTER — APPOINTMENT (OUTPATIENT)
Dept: GENERAL RADIOLOGY | Age: 75
DRG: 176 | End: 2025-02-06
Payer: MEDICARE

## 2025-02-06 DIAGNOSIS — I26.92 ACUTE SADDLE PULMONARY EMBOLISM WITHOUT ACUTE COR PULMONALE (HCC): ICD-10-CM

## 2025-02-06 DIAGNOSIS — I26.99 PULMONARY EMBOLISM, BILATERAL (HCC): Primary | ICD-10-CM

## 2025-02-06 DIAGNOSIS — J96.01 ACUTE RESPIRATORY FAILURE WITH HYPOXIA: ICD-10-CM

## 2025-02-06 LAB
ALBUMIN SERPL-MCNC: 3.8 G/DL (ref 3.5–5.2)
ALP SERPL-CCNC: 145 U/L (ref 35–104)
ALT SERPL-CCNC: 30 U/L (ref 0–32)
ANION GAP SERPL CALCULATED.3IONS-SCNC: 11 MMOL/L (ref 7–16)
AST SERPL-CCNC: 37 U/L (ref 0–31)
B PARAP IS1001 DNA NPH QL NAA+NON-PROBE: NOT DETECTED
B PERT DNA SPEC QL NAA+PROBE: NOT DETECTED
BASOPHILS # BLD: 0.04 K/UL (ref 0–0.2)
BASOPHILS NFR BLD: 0 % (ref 0–2)
BILIRUB SERPL-MCNC: 0.6 MG/DL (ref 0–1.2)
BNP SERPL-MCNC: 187 PG/ML (ref 0–450)
BUN SERPL-MCNC: 31 MG/DL (ref 6–23)
C PNEUM DNA NPH QL NAA+NON-PROBE: NOT DETECTED
CALCIUM SERPL-MCNC: 8.9 MG/DL (ref 8.6–10.2)
CHLORIDE SERPL-SCNC: 105 MMOL/L (ref 98–107)
CO2 SERPL-SCNC: 23 MMOL/L (ref 22–29)
CREAT SERPL-MCNC: 1 MG/DL (ref 0.5–1)
ECHO AO ASC DIAM: 2.6 CM
ECHO AO SINUS VALSALVA DIAM: 3 CM
ECHO AV AREA PEAK VELOCITY: 1.3 CM2
ECHO AV AREA VTI: 1.3 CM2
ECHO AV CUSP MM: 0.8 CM
ECHO AV MEAN GRADIENT: 18 MMHG
ECHO AV MEAN VELOCITY: 2 M/S
ECHO AV PEAK GRADIENT: 32 MMHG
ECHO AV PEAK VELOCITY: 2.8 M/S
ECHO AV VELOCITY RATIO: 0.39
ECHO AV VTI: 60 CM
ECHO EST RA PRESSURE: 15 MMHG
ECHO LA DIAMETER: 3.8 CM
ECHO LA VOL A-L A2C: 54 ML (ref 22–52)
ECHO LA VOL A-L A4C: 63 ML (ref 22–52)
ECHO LA VOL MOD A2C: 50 ML (ref 22–52)
ECHO LA VOL MOD A4C: 57 ML (ref 22–52)
ECHO LA VOLUME AREA LENGTH: 60 ML
ECHO LV EF PHYSICIAN: 68 %
ECHO LV FRACTIONAL SHORTENING: 29 % (ref 28–44)
ECHO LV INTERNAL DIMENSION DIASTOLIC: 3.5 CM (ref 3.9–5.3)
ECHO LV INTERNAL DIMENSION SYSTOLIC: 2.5 CM
ECHO LV IVSD: 1.1 CM (ref 0.6–0.9)
ECHO LV IVSS: 1.3 CM
ECHO LV MASS 2D: 119 G (ref 67–162)
ECHO LV POSTERIOR WALL DIASTOLIC: 1.1 CM (ref 0.6–0.9)
ECHO LV POSTERIOR WALL SYSTOLIC: 1.4 CM
ECHO LV RELATIVE WALL THICKNESS RATIO: 0.63
ECHO LVOT AREA: 3.5 CM2
ECHO LVOT AV VTI INDEX: 0.38
ECHO LVOT DIAM: 2.1 CM
ECHO LVOT MEAN GRADIENT: 3 MMHG
ECHO LVOT PEAK GRADIENT: 4 MMHG
ECHO LVOT PEAK VELOCITY: 1.1 M/S
ECHO LVOT SV: 78.9 ML
ECHO LVOT VTI: 22.8 CM
ECHO MV "A" WAVE DURATION: 145.3 MSEC
ECHO MV A VELOCITY: 1.88 M/S
ECHO MV AREA PHT: 3.3 CM2
ECHO MV AREA VTI: 1.5 CM2
ECHO MV E DECELERATION TIME (DT): 228.6 MS
ECHO MV E VELOCITY: 1.43 M/S
ECHO MV E/A RATIO: 0.76
ECHO MV LVOT VTI INDEX: 2.28
ECHO MV MAX VELOCITY: 2.1 M/S
ECHO MV MEAN GRADIENT: 8 MMHG
ECHO MV MEAN VELOCITY: 1.4 M/S
ECHO MV PEAK GRADIENT: 17 MMHG
ECHO MV PRESSURE HALF TIME (PHT): 65.7 MS
ECHO MV VTI: 52 CM
ECHO PV MAX VELOCITY: 1.3 M/S
ECHO PV MEAN GRADIENT: 4 MMHG
ECHO PV MEAN VELOCITY: 0.9 M/S
ECHO PV PEAK GRADIENT: 6 MMHG
ECHO PV VTI: 26.1 CM
ECHO RIGHT VENTRICULAR SYSTOLIC PRESSURE (RVSP): 82 MMHG
ECHO RV INTERNAL DIMENSION: 3.5 CM
ECHO RV TAPSE: 2.1 CM (ref 1.7–?)
ECHO TV REGURGITANT MAX VELOCITY: 4.09 M/S
ECHO TV REGURGITANT PEAK GRADIENT: 67 MMHG
EKG ATRIAL RATE: 91 BPM
EKG P AXIS: -14 DEGREES
EKG P-R INTERVAL: 182 MS
EKG Q-T INTERVAL: 358 MS
EKG QRS DURATION: 92 MS
EKG QTC CALCULATION (BAZETT): 440 MS
EKG R AXIS: 68 DEGREES
EKG T AXIS: -2 DEGREES
EKG VENTRICULAR RATE: 91 BPM
EOSINOPHIL # BLD: 0.1 K/UL (ref 0.05–0.5)
EOSINOPHILS RELATIVE PERCENT: 1 % (ref 0–6)
ERYTHROCYTE [DISTWIDTH] IN BLOOD BY AUTOMATED COUNT: 16.2 % (ref 11.5–15)
ERYTHROCYTE [DISTWIDTH] IN BLOOD BY AUTOMATED COUNT: 16.3 % (ref 11.5–15)
FLUAV RNA NPH QL NAA+NON-PROBE: NOT DETECTED
FLUBV RNA NPH QL NAA+NON-PROBE: NOT DETECTED
GFR, ESTIMATED: 58 ML/MIN/1.73M2
GLUCOSE SERPL-MCNC: 146 MG/DL (ref 74–99)
HADV DNA NPH QL NAA+NON-PROBE: NOT DETECTED
HCOV 229E RNA NPH QL NAA+NON-PROBE: NOT DETECTED
HCOV HKU1 RNA NPH QL NAA+NON-PROBE: NOT DETECTED
HCOV NL63 RNA NPH QL NAA+NON-PROBE: NOT DETECTED
HCOV OC43 RNA NPH QL NAA+NON-PROBE: NOT DETECTED
HCT VFR BLD AUTO: 39.7 % (ref 34–48)
HCT VFR BLD AUTO: 42.2 % (ref 34–48)
HGB BLD-MCNC: 11.9 G/DL (ref 11.5–15.5)
HGB BLD-MCNC: 12.7 G/DL (ref 11.5–15.5)
HMPV RNA NPH QL NAA+NON-PROBE: NOT DETECTED
HPIV1 RNA NPH QL NAA+NON-PROBE: NOT DETECTED
HPIV2 RNA NPH QL NAA+NON-PROBE: NOT DETECTED
HPIV3 RNA NPH QL NAA+NON-PROBE: NOT DETECTED
HPIV4 RNA NPH QL NAA+NON-PROBE: NOT DETECTED
IMM GRANULOCYTES # BLD AUTO: 0.11 K/UL (ref 0–0.58)
IMM GRANULOCYTES NFR BLD: 1 % (ref 0–5)
LYMPHOCYTES NFR BLD: 0.71 K/UL (ref 1.5–4)
LYMPHOCYTES RELATIVE PERCENT: 6 % (ref 20–42)
M PNEUMO DNA NPH QL NAA+NON-PROBE: NOT DETECTED
MAGNESIUM SERPL-MCNC: 2.1 MG/DL (ref 1.6–2.6)
MCH RBC QN AUTO: 25.9 PG (ref 26–35)
MCH RBC QN AUTO: 26.5 PG (ref 26–35)
MCHC RBC AUTO-ENTMCNC: 30 G/DL (ref 32–34.5)
MCHC RBC AUTO-ENTMCNC: 30.1 G/DL (ref 32–34.5)
MCV RBC AUTO: 86.5 FL (ref 80–99.9)
MCV RBC AUTO: 87.9 FL (ref 80–99.9)
MONOCYTES NFR BLD: 0.84 K/UL (ref 0.1–0.95)
MONOCYTES NFR BLD: 8 % (ref 2–12)
NEUTROPHILS NFR BLD: 84 % (ref 43–80)
NEUTS SEG NFR BLD: 9.44 K/UL (ref 1.8–7.3)
PARTIAL THROMBOPLASTIN TIME: 110.3 SEC (ref 24.5–35.1)
PARTIAL THROMBOPLASTIN TIME: 29.4 SEC (ref 24.5–35.1)
PLATELET # BLD AUTO: 224 K/UL (ref 130–450)
PLATELET # BLD AUTO: 228 K/UL (ref 130–450)
PMV BLD AUTO: 10.2 FL (ref 7–12)
PMV BLD AUTO: 10.4 FL (ref 7–12)
POTASSIUM SERPL-SCNC: 3.8 MMOL/L (ref 3.5–5)
PROT SERPL-MCNC: 6.7 G/DL (ref 6.4–8.3)
RBC # BLD AUTO: 4.59 M/UL (ref 3.5–5.5)
RBC # BLD AUTO: 4.8 M/UL (ref 3.5–5.5)
RSV RNA NPH QL NAA+NON-PROBE: NOT DETECTED
RV+EV RNA NPH QL NAA+NON-PROBE: NOT DETECTED
SARS-COV-2 RNA NPH QL NAA+NON-PROBE: NOT DETECTED
SODIUM SERPL-SCNC: 139 MMOL/L (ref 132–146)
SPECIMEN DESCRIPTION: NORMAL
TROPONIN I SERPL HS-MCNC: 41 NG/L (ref 0–9)
TROPONIN I SERPL HS-MCNC: 46 NG/L (ref 0–9)
TSH SERPL DL<=0.05 MIU/L-ACNC: 1.09 UIU/ML (ref 0.27–4.2)
WBC OTHER # BLD: 11.2 K/UL (ref 4.5–11.5)
WBC OTHER # BLD: 13 K/UL (ref 4.5–11.5)

## 2025-02-06 PROCEDURE — 99285 EMERGENCY DEPT VISIT HI MDM: CPT

## 2025-02-06 PROCEDURE — 6360000004 HC RX CONTRAST MEDICATION: Performed by: RADIOLOGY

## 2025-02-06 PROCEDURE — 83880 ASSAY OF NATRIURETIC PEPTIDE: CPT

## 2025-02-06 PROCEDURE — 96375 TX/PRO/DX INJ NEW DRUG ADDON: CPT

## 2025-02-06 PROCEDURE — 76536 US EXAM OF HEAD AND NECK: CPT

## 2025-02-06 PROCEDURE — 85025 COMPLETE CBC W/AUTO DIFF WBC: CPT

## 2025-02-06 PROCEDURE — 93010 ELECTROCARDIOGRAM REPORT: CPT | Performed by: INTERNAL MEDICINE

## 2025-02-06 PROCEDURE — 93306 TTE W/DOPPLER COMPLETE: CPT | Performed by: INTERNAL MEDICINE

## 2025-02-06 PROCEDURE — 84484 ASSAY OF TROPONIN QUANT: CPT

## 2025-02-06 PROCEDURE — 93306 TTE W/DOPPLER COMPLETE: CPT

## 2025-02-06 PROCEDURE — 84443 ASSAY THYROID STIM HORMONE: CPT

## 2025-02-06 PROCEDURE — 94640 AIRWAY INHALATION TREATMENT: CPT

## 2025-02-06 PROCEDURE — 93970 EXTREMITY STUDY: CPT

## 2025-02-06 PROCEDURE — 99291 CRITICAL CARE FIRST HOUR: CPT | Performed by: INTERNAL MEDICINE

## 2025-02-06 PROCEDURE — 6370000000 HC RX 637 (ALT 250 FOR IP): Performed by: PHYSICIAN ASSISTANT

## 2025-02-06 PROCEDURE — 85027 COMPLETE CBC AUTOMATED: CPT

## 2025-02-06 PROCEDURE — 2580000003 HC RX 258: Performed by: PHYSICIAN ASSISTANT

## 2025-02-06 PROCEDURE — 2500000003 HC RX 250 WO HCPCS: Performed by: PHYSICIAN ASSISTANT

## 2025-02-06 PROCEDURE — 71046 X-RAY EXAM CHEST 2 VIEWS: CPT

## 2025-02-06 PROCEDURE — 2500000003 HC RX 250 WO HCPCS: Performed by: STUDENT IN AN ORGANIZED HEALTH CARE EDUCATION/TRAINING PROGRAM

## 2025-02-06 PROCEDURE — 96366 THER/PROPH/DIAG IV INF ADDON: CPT

## 2025-02-06 PROCEDURE — 93005 ELECTROCARDIOGRAM TRACING: CPT | Performed by: PHYSICIAN ASSISTANT

## 2025-02-06 PROCEDURE — 6360000002 HC RX W HCPCS: Performed by: PHYSICIAN ASSISTANT

## 2025-02-06 PROCEDURE — 96365 THER/PROPH/DIAG IV INF INIT: CPT

## 2025-02-06 PROCEDURE — 6370000000 HC RX 637 (ALT 250 FOR IP): Performed by: INTERNAL MEDICINE

## 2025-02-06 PROCEDURE — 71275 CT ANGIOGRAPHY CHEST: CPT

## 2025-02-06 PROCEDURE — 83735 ASSAY OF MAGNESIUM: CPT

## 2025-02-06 PROCEDURE — 2000000000 HC ICU R&B

## 2025-02-06 PROCEDURE — 80053 COMPREHEN METABOLIC PANEL: CPT

## 2025-02-06 PROCEDURE — 85730 THROMBOPLASTIN TIME PARTIAL: CPT

## 2025-02-06 PROCEDURE — 0202U NFCT DS 22 TRGT SARS-COV-2: CPT

## 2025-02-06 RX ORDER — IOPAMIDOL 755 MG/ML
75 INJECTION, SOLUTION INTRAVASCULAR
Status: COMPLETED | OUTPATIENT
Start: 2025-02-06 | End: 2025-02-06

## 2025-02-06 RX ORDER — SODIUM CHLORIDE 0.9 % (FLUSH) 0.9 %
5-40 SYRINGE (ML) INJECTION PRN
Status: DISCONTINUED | OUTPATIENT
Start: 2025-02-06 | End: 2025-02-10 | Stop reason: HOSPADM

## 2025-02-06 RX ORDER — HEPARIN SODIUM 1000 [USP'U]/ML
80 INJECTION, SOLUTION INTRAVENOUS; SUBCUTANEOUS ONCE
Status: COMPLETED | OUTPATIENT
Start: 2025-02-06 | End: 2025-02-06

## 2025-02-06 RX ORDER — HEPARIN SODIUM 1000 [USP'U]/ML
80 INJECTION, SOLUTION INTRAVENOUS; SUBCUTANEOUS PRN
Status: DISCONTINUED | OUTPATIENT
Start: 2025-02-06 | End: 2025-02-07

## 2025-02-06 RX ORDER — POTASSIUM CHLORIDE 7.45 MG/ML
10 INJECTION INTRAVENOUS PRN
Status: DISCONTINUED | OUTPATIENT
Start: 2025-02-06 | End: 2025-02-10 | Stop reason: HOSPADM

## 2025-02-06 RX ORDER — ONDANSETRON 2 MG/ML
4 INJECTION INTRAMUSCULAR; INTRAVENOUS EVERY 6 HOURS PRN
Status: DISCONTINUED | OUTPATIENT
Start: 2025-02-06 | End: 2025-02-10 | Stop reason: HOSPADM

## 2025-02-06 RX ORDER — HEPARIN SODIUM 1000 [USP'U]/ML
40 INJECTION, SOLUTION INTRAVENOUS; SUBCUTANEOUS PRN
Status: DISCONTINUED | OUTPATIENT
Start: 2025-02-06 | End: 2025-02-07

## 2025-02-06 RX ORDER — SODIUM CHLORIDE 0.9 % (FLUSH) 0.9 %
5-40 SYRINGE (ML) INJECTION EVERY 12 HOURS SCHEDULED
Status: DISCONTINUED | OUTPATIENT
Start: 2025-02-06 | End: 2025-02-10 | Stop reason: HOSPADM

## 2025-02-06 RX ORDER — POLYETHYLENE GLYCOL 3350 17 G/17G
17 POWDER, FOR SOLUTION ORAL DAILY PRN
Status: DISCONTINUED | OUTPATIENT
Start: 2025-02-06 | End: 2025-02-10 | Stop reason: HOSPADM

## 2025-02-06 RX ORDER — ONDANSETRON 4 MG/1
4 TABLET, ORALLY DISINTEGRATING ORAL EVERY 8 HOURS PRN
Status: DISCONTINUED | OUTPATIENT
Start: 2025-02-06 | End: 2025-02-10 | Stop reason: HOSPADM

## 2025-02-06 RX ORDER — SODIUM CHLORIDE 9 MG/ML
INJECTION, SOLUTION INTRAVENOUS PRN
Status: DISCONTINUED | OUTPATIENT
Start: 2025-02-06 | End: 2025-02-10 | Stop reason: HOSPADM

## 2025-02-06 RX ORDER — POTASSIUM CHLORIDE 1500 MG/1
40 TABLET, EXTENDED RELEASE ORAL PRN
Status: DISCONTINUED | OUTPATIENT
Start: 2025-02-06 | End: 2025-02-10 | Stop reason: HOSPADM

## 2025-02-06 RX ORDER — ACETAMINOPHEN 325 MG/1
650 TABLET ORAL EVERY 6 HOURS PRN
Status: DISCONTINUED | OUTPATIENT
Start: 2025-02-06 | End: 2025-02-10 | Stop reason: HOSPADM

## 2025-02-06 RX ORDER — MAGNESIUM SULFATE IN WATER 40 MG/ML
2000 INJECTION, SOLUTION INTRAVENOUS PRN
Status: DISCONTINUED | OUTPATIENT
Start: 2025-02-06 | End: 2025-02-10 | Stop reason: HOSPADM

## 2025-02-06 RX ORDER — IPRATROPIUM BROMIDE AND ALBUTEROL SULFATE 2.5; .5 MG/3ML; MG/3ML
1 SOLUTION RESPIRATORY (INHALATION)
Status: DISCONTINUED | OUTPATIENT
Start: 2025-02-06 | End: 2025-02-10 | Stop reason: HOSPADM

## 2025-02-06 RX ORDER — ACETAMINOPHEN 650 MG/1
650 SUPPOSITORY RECTAL EVERY 6 HOURS PRN
Status: DISCONTINUED | OUTPATIENT
Start: 2025-02-06 | End: 2025-02-10 | Stop reason: HOSPADM

## 2025-02-06 RX ORDER — SODIUM CHLORIDE 0.9 % (FLUSH) 0.9 %
10 SYRINGE (ML) INJECTION
Status: ACTIVE | OUTPATIENT
Start: 2025-02-06 | End: 2025-02-07

## 2025-02-06 RX ORDER — 0.9 % SODIUM CHLORIDE 0.9 %
500 INTRAVENOUS SOLUTION INTRAVENOUS ONCE
Status: COMPLETED | OUTPATIENT
Start: 2025-02-06 | End: 2025-02-06

## 2025-02-06 RX ORDER — HEPARIN SODIUM 10000 [USP'U]/100ML
5-30 INJECTION, SOLUTION INTRAVENOUS CONTINUOUS
Status: DISCONTINUED | OUTPATIENT
Start: 2025-02-06 | End: 2025-02-07

## 2025-02-06 RX ADMIN — HEPARIN SODIUM 8300 UNITS: 1000 INJECTION INTRAVENOUS; SUBCUTANEOUS at 11:32

## 2025-02-06 RX ADMIN — SODIUM CHLORIDE, PRESERVATIVE FREE 10 ML: 5 INJECTION INTRAVENOUS at 20:17

## 2025-02-06 RX ADMIN — PETROLATUM: 420 OINTMENT TOPICAL at 20:17

## 2025-02-06 RX ADMIN — WATER 80 MG: 1 INJECTION INTRAMUSCULAR; INTRAVENOUS; SUBCUTANEOUS at 09:12

## 2025-02-06 RX ADMIN — IOPAMIDOL 75 ML: 755 INJECTION, SOLUTION INTRAVENOUS at 10:05

## 2025-02-06 RX ADMIN — IPRATROPIUM BROMIDE AND ALBUTEROL SULFATE 1 DOSE: 2.5; .5 SOLUTION RESPIRATORY (INHALATION) at 21:16

## 2025-02-06 RX ADMIN — IPRATROPIUM BROMIDE AND ALBUTEROL SULFATE 1 DOSE: 2.5; .5 SOLUTION RESPIRATORY (INHALATION) at 12:17

## 2025-02-06 RX ADMIN — SODIUM CHLORIDE 500 ML: 0.9 INJECTION, SOLUTION INTRAVENOUS at 10:59

## 2025-02-06 RX ADMIN — HEPARIN SODIUM 18 UNITS/KG/HR: 10000 INJECTION, SOLUTION INTRAVENOUS at 11:36

## 2025-02-06 NOTE — ED PROVIDER NOTES
Shared JOSH-ED Attending Visit.  CC: Yes-35 min         [facility] EMERGENCY DEPARTMENT  ED  Encounter Note  Admit Date/RoomTime: 2025 10:40 AM  ED Room: 35/35  NAME: Chery Hayden  : 1950  MRN: [id number]  PCP: Stan Leija DO    CHIEF COMPLAINT     Cold Symptoms (Patient c/o URI x 2 weeks. Still wheezing. Denies feeling short of breath. Per family O2 sat low. Arrives on 2L NC. No O2 at baseline. )    HISTORY OF PRESENT ILLNESS        Chery Hayden is a 74 y.o. female who presents to the ED by private vehicle for cold-like symptoms and shortness of breath for the last 2 weeks.  Patient is very hard of hearing.  Patient is companied by son.  Patient's son states that everyone in the house has had the flu for the last few weeks.  He states that she has been short of breath for the last 2 weeks but \"want admitted because she does not want to be here.\"  Patient has been audibly wheezing per family members.  Patient's granddaughter is a nurse and placed a pulse ox on her finger and it read 84% at home therefore they brought her in for evaluation.  Patient was 88% when arriving to the emergency department was immediately placed on 2 L.  Patient is around 91% at this time.  Patient does not have a history of COPD, asthma, bronchitis or pneumonia.  Patient is not a smoker.  Patient's only pertinent medical history is obesity, lymphedema, hypertension and hyperlipidemia.  Patient denies any falls or trauma.  The complaint has been stable and are moderate in severity.      REVIEW OF SYSTEMS     Pertinent positives and negatives are stated within HPI, all other systems reviewed and are negative.    Past Medical History:  has a past medical history of Arthritis, Bleeding ulcer, and Hypertension.  Surgical History:  has a past surgical history that includes joint replacement (Left);  section; Hysterectomy; ERCP (2016); and Cholecystectomy, laparoscopic

## 2025-02-06 NOTE — PROGRESS NOTES
4 Eyes Skin Assessment     NAME:  Chery Hayden  YOB: 1950  MEDICAL RECORD NUMBER:  [id number]    The patient is being assessed for  Admission    I agree that at least one RN has performed a thorough Head to Toe Skin Assessment on the patient. ALL assessment sites listed below have been assessed.      Areas assessed by both nurses:    Head, Face, Ears, Shoulders, Back, Chest, Arms, Elbows, Hands, Sacrum. Buttock, Coccyx, Ischium, Legs. Feet and Heels, and Under Medical Devices         Does the Patient have a Wound? Yes wound(s) were present on assessment. LDA wound assessment was Initiated and completed by RN    L buttock - DTI 1 x 0.5 cm nonblanchable redness  Excoriation/redness to abdominal folds  BLE vascular discoloration, L>R leg red/warm  Heels dry/boggy/flaky, red/blanchable       Moose Prevention initiated by RN: Yes  Wound Care Orders initiated by RN: Yes    Pressure Injury (Stage 3,4, Unstageable, DTI, NWPT, and Complex wounds) if present, place Wound referral order by RN under : Yes    New Ostomies, if present place, Ostomy referral order under : Yes     Nurse 1 eSignature: Electronically signed by Whitney Christopher RN on 2/6/25 at 6:23 PM EST    **SHARE this note so that the co-signing nurse can place an eSignature**    Nurse 2 eSignature: Electronically signed by Mirella Snider RN on 2/6/25 at 6:48 PM EST

## 2025-02-06 NOTE — CONSULTS
Critical Care Consult Note    Patient - Chery Hayden   MRN -  [id number]   [id number]   - 1950      Date of Admission -  2025 10:40 AM  Date of evaluation -  2025  35/35   Hospital Day - 0          ADMIT/CONSULT DETAILS     Reason for Admit/Consult   PE    Consulting Service/Physician   Consulting - Markel Kelly MD  Primary Care Physician - Stan Leija, DO         HPI   &3 yo F with Pmx of Arthritis,Bleeding ulcer, HTN presented with SOB. CTA chest is consistent with B/L PE. Heparin rip started. Troponin & BNP not elevated. Admitted in MICU for B/L PE.         Past Medical History         Diagnosis Date    Arthritis     Bleeding ulcer     Hypertension         Past Surgical History           Procedure Laterality Date     SECTION      CHOLECYSTECTOMY, LAPAROSCOPIC  2016    ERCP  2016    HYSTERECTOMY (CERVIX STATUS UNKNOWN)      JOINT REPLACEMENT Left     left knee         Current Medications   Current Medications    ipratropium 0.5 mg-albuterol 2.5 mg  1 Dose Inhalation Q4H WA RT    methylPREDNISolone  80 mg IntraVENous Daily    sodium chloride flush  5-40 mL IntraVENous 2 times per day     sodium chloride flush, heparin (porcine), heparin (porcine), sodium chloride flush, sodium chloride, potassium chloride **OR** potassium alternative oral replacement **OR** potassium chloride, magnesium sulfate, ondansetron **OR** ondansetron, polyethylene glycol, acetaminophen **OR** acetaminophen  IV Drips/Infusions   heparin (PORCINE) Infusion 18 Units/kg/hr (25 1419)    sodium chloride       Home Medications  Not in a hospital admission.    Diet/Nutrition   ADULT DIET; Regular    Allergies   Patient has no known allergies.    Social History   Tobacco   reports that she has never smoked. She has never used smokeless tobacco.    Alcohol     reports no history of alcohol use.        Occupational history :    Family History   No family history on file.      ROS   REVIEW OF

## 2025-02-06 NOTE — CARE COORDINATION
Internal Medicine On-call Care Coordination Note    I was called by the ED physician because they recommended admission for this patient and we cover their PCP.  The history as I understand it after discussion with the ED physician is as follows:    Patient having 2 weeks of SOB, has multiple recent sick contacts, multiple family members have the flu. Patient very hard of hearing  Patient was found to be SpO2: 84% at home by home pulse ox and family subsequently brought patient to the ED  In the ED  patient seen to be 88% on room air. Improved to 92% on 4L nasal cannula  CTA Chest: Study is positive for pulmonary embolic disease with a saddle embolus in the distal main right pulmonary artery extending into the right upper and right lower lobes as well as multiple right lower lobe segmental emboli. Solitary embolus in the left lower lobe medial basilar segmental artery.  Troponin 46 > 41, BNP: 187  2 weeks SOB, O2 97% on 4L. Saddle PE without heart strain. [place]ri[place]  Unprovoked. No COPD. Very Hard of Hearing. Lots of arthritis    I placed admission orders.  Including:    General admission orders  Home meds to be reordered when reconciled  Critical care consulted  Continue heparin drip  Continue O2 supplementation and wean as tolerated  DVT US negative    Either Dr. Kelly, Dr. Quiñones, or our coverage will see the patient tomorrow for H&P.    DVT prophylaxis: Heparin drip  Code Status: FULL    Electronically signed by Markel Kelly MD on 2/6/2025 at 1:44 PM

## 2025-02-06 NOTE — PROGRESS NOTES
Patient admitted to MICU with the following belongings:  Dentures (Upper), Hearing Aids , Shoes, and Other wheelchair, compression stockings . The following belongings admitted with the patient, Pants, Shirt, Socks, and Jacket, were sent home with the patient's family.

## 2025-02-06 NOTE — PROGRESS NOTES
Belongings bagged labeled and given to family who is at bedside.  Pt has home wheel chair that was also labeled

## 2025-02-06 NOTE — ED NOTES
SpO2 88% on room air. Family reports has been as low as 84% at home. Patient place on 2L nc and oxygen levels returned immediately above 90%.

## 2025-02-06 NOTE — PLAN OF CARE
Problem: Safety - Adult  Goal: Free from fall injury  Outcome: Progressing  Flowsheets (Taken 2/6/2025 1831)  Free From Fall Injury: Instruct family/caregiver on patient safety     Problem: ABCDS Injury Assessment  Goal: Absence of physical injury  Outcome: Progressing  Flowsheets (Taken 2/6/2025 1831)  Absence of Physical Injury: Implement safety measures based on patient assessment     Problem: Neurosensory - Adult  Goal: Achieves stable or improved neurological status  Outcome: Progressing  Flowsheets (Taken 2/6/2025 1831)  Achieves stable or improved neurological status:   Assess for and report changes in neurological status   Initiate measures to prevent increased intracranial pressure   Maintain blood pressure and fluid volume within ordered parameters to optimize cerebral perfusion and minimize risk of hemorrhage   Monitor temperature, glucose, and sodium. Initiate appropriate interventions as ordered     Problem: Respiratory - Adult  Goal: Achieves optimal ventilation and oxygenation  Outcome: Progressing  Flowsheets (Taken 2/6/2025 1831)  Achieves optimal ventilation and oxygenation:   Assess for changes in respiratory status   Assess for changes in mentation and behavior   Position to facilitate oxygenation and minimize respiratory effort   Oxygen supplementation based on oxygen saturation or arterial blood gases   Encourage broncho-pulmonary hygiene including cough, deep breathe, incentive spirometry   Assess and instruct to report shortness of breath or any respiratory difficulty   Respiratory therapy support as indicated     Problem: Cardiovascular - Adult  Goal: Maintains optimal cardiac output and hemodynamic stability  Outcome: Progressing  Flowsheets (Taken 2/6/2025 1831)  Maintains optimal cardiac output and hemodynamic stability:   Monitor blood pressure and heart rate   Monitor urine output and notify Licensed Independent Practitioner for values outside of normal range   Assess for signs of  decreased cardiac output     Problem: Gastrointestinal - Adult  Goal: Maintains adequate nutritional intake  Outcome: Progressing  Flowsheets (Taken 2/6/2025 1831)  Maintains adequate nutritional intake: Monitor percentage of each meal consumed     Problem: Infection - Adult  Goal: Absence of infection at discharge  Outcome: Progressing  Flowsheets (Taken 2/6/2025 1831)  Absence of infection at discharge: Assess and monitor for signs and symptoms of infection     Problem: Skin/Tissue Integrity  Goal: Skin integrity remains intact  Description: 1.  Monitor for areas of redness and/or skin breakdown  2.  Assess vascular access sites hourly  3.  Every 4-6 hours minimum:  Change oxygen saturation probe site  4.  Every 4-6 hours:  If on nasal continuous positive airway pressure, respiratory therapy assess nares and determine need for appliance change or resting period  Outcome: Not Progressing  Flowsheets (Taken 2/6/2025 1831)  Skin Integrity Remains Intact: Monitor for areas of redness and/or skin breakdown     Problem: Skin/Tissue Integrity - Adult  Goal: Skin integrity remains intact  Description: 1.  Monitor for areas of redness and/or skin breakdown  2.  Assess vascular access sites hourly  3.  Every 4-6 hours minimum:  Change oxygen saturation probe site  4.  Every 4-6 hours:  If on nasal continuous positive airway pressure, respiratory therapy assess nares and determine need for appliance change or resting period  Outcome: Not Progressing  Flowsheets (Taken 2/6/2025 1831)  Skin Integrity Remains Intact: Monitor for areas of redness and/or skin breakdown      No

## 2025-02-07 PROBLEM — I10 PRIMARY HYPERTENSION: Status: ACTIVE | Noted: 2025-02-07

## 2025-02-07 PROBLEM — E04.2 MULTINODULAR GOITER (NONTOXIC): Status: ACTIVE | Noted: 2025-02-07

## 2025-02-07 LAB
ALBUMIN SERPL-MCNC: 3.5 G/DL (ref 3.5–5.2)
ALP SERPL-CCNC: 128 U/L (ref 35–104)
ALT SERPL-CCNC: 21 U/L (ref 0–32)
ANION GAP SERPL CALCULATED.3IONS-SCNC: 16 MMOL/L (ref 7–16)
AST SERPL-CCNC: 14 U/L (ref 0–31)
BASOPHILS # BLD: 0.02 K/UL (ref 0–0.2)
BASOPHILS NFR BLD: 0 % (ref 0–2)
BILIRUB SERPL-MCNC: 0.3 MG/DL (ref 0–1.2)
BUN SERPL-MCNC: 28 MG/DL (ref 6–23)
CALCIUM SERPL-MCNC: 8.8 MG/DL (ref 8.6–10.2)
CHLORIDE SERPL-SCNC: 104 MMOL/L (ref 98–107)
CO2 SERPL-SCNC: 20 MMOL/L (ref 22–29)
CREAT SERPL-MCNC: 0.9 MG/DL (ref 0.5–1)
EOSINOPHIL # BLD: 0 K/UL (ref 0.05–0.5)
EOSINOPHILS RELATIVE PERCENT: 0 % (ref 0–6)
ERYTHROCYTE [DISTWIDTH] IN BLOOD BY AUTOMATED COUNT: 15.8 % (ref 11.5–15)
ERYTHROCYTE [DISTWIDTH] IN BLOOD BY AUTOMATED COUNT: 15.9 % (ref 11.5–15)
GFR, ESTIMATED: 65 ML/MIN/1.73M2
GLUCOSE SERPL-MCNC: 254 MG/DL (ref 74–99)
HCT VFR BLD AUTO: 35.6 % (ref 34–48)
HCT VFR BLD AUTO: 37.1 % (ref 34–48)
HGB BLD-MCNC: 11 G/DL (ref 11.5–15.5)
HGB BLD-MCNC: 11.5 G/DL (ref 11.5–15.5)
IMM GRANULOCYTES # BLD AUTO: 0.08 K/UL (ref 0–0.58)
IMM GRANULOCYTES NFR BLD: 1 % (ref 0–5)
INR PPP: 1
LYMPHOCYTES NFR BLD: 0.26 K/UL (ref 1.5–4)
LYMPHOCYTES RELATIVE PERCENT: 3 % (ref 20–42)
MAGNESIUM SERPL-MCNC: 2.3 MG/DL (ref 1.6–2.6)
MCH RBC QN AUTO: 26.4 PG (ref 26–35)
MCH RBC QN AUTO: 26.6 PG (ref 26–35)
MCHC RBC AUTO-ENTMCNC: 30.9 G/DL (ref 32–34.5)
MCHC RBC AUTO-ENTMCNC: 31 G/DL (ref 32–34.5)
MCV RBC AUTO: 85.3 FL (ref 80–99.9)
MCV RBC AUTO: 86 FL (ref 80–99.9)
MONOCYTES NFR BLD: 0.19 K/UL (ref 0.1–0.95)
MONOCYTES NFR BLD: 2 % (ref 2–12)
NEUTROPHILS NFR BLD: 94 % (ref 43–80)
NEUTS SEG NFR BLD: 8.46 K/UL (ref 1.8–7.3)
PARTIAL THROMBOPLASTIN TIME: 28.7 SEC (ref 24.5–35.1)
PARTIAL THROMBOPLASTIN TIME: 75.2 SEC (ref 24.5–35.1)
PARTIAL THROMBOPLASTIN TIME: 78.3 SEC (ref 24.5–35.1)
PHOSPHATE SERPL-MCNC: 2.6 MG/DL (ref 2.5–4.5)
PLATELET # BLD AUTO: 208 K/UL (ref 130–450)
PLATELET # BLD AUTO: 226 K/UL (ref 130–450)
PMV BLD AUTO: 10.8 FL (ref 7–12)
PMV BLD AUTO: 11.2 FL (ref 7–12)
POTASSIUM SERPL-SCNC: 4 MMOL/L (ref 3.5–5)
PROT SERPL-MCNC: 6.2 G/DL (ref 6.4–8.3)
PROTHROMBIN TIME: 10.9 SEC (ref 9.3–12.4)
RBC # BLD AUTO: 4.14 M/UL (ref 3.5–5.5)
RBC # BLD AUTO: 4.35 M/UL (ref 3.5–5.5)
RBC # BLD: ABNORMAL 10*6/UL
RBC # BLD: ABNORMAL 10*6/UL
SODIUM SERPL-SCNC: 140 MMOL/L (ref 132–146)
WBC OTHER # BLD: 11.2 K/UL (ref 4.5–11.5)
WBC OTHER # BLD: 9 K/UL (ref 4.5–11.5)

## 2025-02-07 PROCEDURE — 85027 COMPLETE CBC AUTOMATED: CPT

## 2025-02-07 PROCEDURE — 81291 MTHFR GENE: CPT

## 2025-02-07 PROCEDURE — 85730 THROMBOPLASTIN TIME PARTIAL: CPT

## 2025-02-07 PROCEDURE — 84100 ASSAY OF PHOSPHORUS: CPT

## 2025-02-07 PROCEDURE — 85610 PROTHROMBIN TIME: CPT

## 2025-02-07 PROCEDURE — 86147 CARDIOLIPIN ANTIBODY EA IG: CPT

## 2025-02-07 PROCEDURE — 81241 F5 GENE: CPT

## 2025-02-07 PROCEDURE — 84439 ASSAY OF FREE THYROXINE: CPT

## 2025-02-07 PROCEDURE — 2500000003 HC RX 250 WO HCPCS: Performed by: STUDENT IN AN ORGANIZED HEALTH CARE EDUCATION/TRAINING PROGRAM

## 2025-02-07 PROCEDURE — 85025 COMPLETE CBC W/AUTO DIFF WBC: CPT

## 2025-02-07 PROCEDURE — 81400 MOPATH PROCEDURE LEVEL 1: CPT

## 2025-02-07 PROCEDURE — 99222 1ST HOSP IP/OBS MODERATE 55: CPT | Performed by: INTERNAL MEDICINE

## 2025-02-07 PROCEDURE — 6360000002 HC RX W HCPCS: Performed by: PHYSICIAN ASSISTANT

## 2025-02-07 PROCEDURE — 2500000003 HC RX 250 WO HCPCS: Performed by: PHYSICIAN ASSISTANT

## 2025-02-07 PROCEDURE — 94640 AIRWAY INHALATION TREATMENT: CPT

## 2025-02-07 PROCEDURE — 99291 CRITICAL CARE FIRST HOUR: CPT | Performed by: INTERNAL MEDICINE

## 2025-02-07 PROCEDURE — 80053 COMPREHEN METABOLIC PANEL: CPT

## 2025-02-07 PROCEDURE — 6360000002 HC RX W HCPCS: Performed by: INTERNAL MEDICINE

## 2025-02-07 PROCEDURE — 81240 F2 GENE: CPT

## 2025-02-07 PROCEDURE — 2700000000 HC OXYGEN THERAPY PER DAY

## 2025-02-07 PROCEDURE — 6370000000 HC RX 637 (ALT 250 FOR IP): Performed by: PHYSICIAN ASSISTANT

## 2025-02-07 PROCEDURE — 6370000000 HC RX 637 (ALT 250 FOR IP): Performed by: INTERNAL MEDICINE

## 2025-02-07 PROCEDURE — 2000000000 HC ICU R&B

## 2025-02-07 PROCEDURE — 83735 ASSAY OF MAGNESIUM: CPT

## 2025-02-07 RX ORDER — AMLODIPINE BESYLATE 5 MG/1
5 TABLET ORAL DAILY
Status: DISCONTINUED | OUTPATIENT
Start: 2025-02-07 | End: 2025-02-09

## 2025-02-07 RX ORDER — LISINOPRIL 10 MG/1
20 TABLET ORAL DAILY
Status: DISCONTINUED | OUTPATIENT
Start: 2025-02-07 | End: 2025-02-10 | Stop reason: HOSPADM

## 2025-02-07 RX ORDER — HEPARIN SODIUM 10000 [USP'U]/100ML
5-30 INJECTION, SOLUTION INTRAVENOUS CONTINUOUS
Status: DISCONTINUED | OUTPATIENT
Start: 2025-02-07 | End: 2025-02-08

## 2025-02-07 RX ORDER — HEPARIN SODIUM 1000 [USP'U]/ML
80 INJECTION, SOLUTION INTRAVENOUS; SUBCUTANEOUS PRN
Status: DISCONTINUED | OUTPATIENT
Start: 2025-02-07 | End: 2025-02-08

## 2025-02-07 RX ORDER — PANTOPRAZOLE SODIUM 40 MG/1
40 TABLET, DELAYED RELEASE ORAL
Status: DISCONTINUED | OUTPATIENT
Start: 2025-02-08 | End: 2025-02-10 | Stop reason: HOSPADM

## 2025-02-07 RX ORDER — HEPARIN SODIUM 1000 [USP'U]/ML
40 INJECTION, SOLUTION INTRAVENOUS; SUBCUTANEOUS PRN
Status: DISCONTINUED | OUTPATIENT
Start: 2025-02-07 | End: 2025-02-08

## 2025-02-07 RX ADMIN — WATER 80 MG: 1 INJECTION INTRAMUSCULAR; INTRAVENOUS; SUBCUTANEOUS at 09:13

## 2025-02-07 RX ADMIN — PETROLATUM: 420 OINTMENT TOPICAL at 09:14

## 2025-02-07 RX ADMIN — LISINOPRIL 20 MG: 20 TABLET ORAL at 10:08

## 2025-02-07 RX ADMIN — IPRATROPIUM BROMIDE AND ALBUTEROL SULFATE 1 DOSE: 2.5; .5 SOLUTION RESPIRATORY (INHALATION) at 08:21

## 2025-02-07 RX ADMIN — HEPARIN SODIUM 16 UNITS/KG/HR: 10000 INJECTION, SOLUTION INTRAVENOUS at 01:23

## 2025-02-07 RX ADMIN — SODIUM CHLORIDE, PRESERVATIVE FREE 10 ML: 5 INJECTION INTRAVENOUS at 20:45

## 2025-02-07 RX ADMIN — IPRATROPIUM BROMIDE AND ALBUTEROL SULFATE 1 DOSE: 2.5; .5 SOLUTION RESPIRATORY (INHALATION) at 20:22

## 2025-02-07 RX ADMIN — PETROLATUM: 420 OINTMENT TOPICAL at 20:34

## 2025-02-07 RX ADMIN — AMLODIPINE BESYLATE 5 MG: 5 TABLET ORAL at 10:09

## 2025-02-07 RX ADMIN — SODIUM CHLORIDE, PRESERVATIVE FREE 10 ML: 5 INJECTION INTRAVENOUS at 09:14

## 2025-02-07 RX ADMIN — APIXABAN 10 MG: 5 TABLET, FILM COATED ORAL at 10:11

## 2025-02-07 RX ADMIN — HEPARIN SODIUM 18 UNITS/KG/HR: 10000 INJECTION, SOLUTION INTRAVENOUS at 20:47

## 2025-02-07 RX ADMIN — IPRATROPIUM BROMIDE AND ALBUTEROL SULFATE 1 DOSE: 2.5; .5 SOLUTION RESPIRATORY (INHALATION) at 16:37

## 2025-02-07 NOTE — CONSULTS
[facility]  Department of Internal Medicine  Division of Pulmonary, Critical Care and Sleep Medicine  Consult Note    DESMOND Cummins MD, MD, MD, MD, MD    Patient: Chery Hayden  MRN: [id number]  : 1950    Encounter Time: 2:47 PM     Date of Admission: 2025 10:40 AM    Primary Care Physician: Stan Leija DO    Primary Pulmonologist: None    Reason for Consultation: Pulmonary embolism     HISTORY OF PRESENT ILLNESS : Chery Hayden 74 y.o. female was seen in consultation regarding the above chief compliant.  Patient seen and examined.  For me she tells me that she came to the emergency room because her granddaughter \"did not like the way she looked \"she currently denies any symptoms for me of shortness of breath or coughing.  She denies any periods of immobilization.  She denies any recent surgeries.  She denies any former history of blood clots denies any familial history of blood clots she denies any personal history of cancer.      PAST MEDICAL HISTORY:  has a past medical history of Arthritis, Bleeding ulcer, and Hypertension.    SURGICAL HISTORY:  has a past surgical history that includes joint replacement (Left);  section; Hysterectomy; ERCP (2016); and Cholecystectomy, laparoscopic (2016).     SOCIAL HISTORY:  reports that she has never smoked. She has never used smokeless tobacco. She reports that she does not drink alcohol and does not use drugs.     FAMILY  HISTORY: family history is not on file.     MEDICATIONS:    Prior to Admission medications    Medication Sig Start Date End Date Taking? Authorizing Provider   diclofenac sodium (VOLTAREN) 1 % GEL Apply 2 g topically 4 times daily  Patient not taking: Reported on 2025   Araceli Urena

## 2025-02-07 NOTE — PLAN OF CARE
Problem: Discharge Planning  Goal: Discharge to home or other facility with appropriate resources  Outcome: Progressing  Flowsheets (Taken 2/6/2025 2000)  Discharge to home or other facility with appropriate resources: Identify barriers to discharge with patient and caregiver     Problem: Skin/Tissue Integrity  Goal: Skin integrity remains intact  Description: 1.  Monitor for areas of redness and/or skin breakdown  2.  Assess vascular access sites hourly  3.  Every 4-6 hours minimum:  Change oxygen saturation probe site  4.  Every 4-6 hours:  If on nasal continuous positive airway pressure, respiratory therapy assess nares and determine need for appliance change or resting period  2/6/2025 2053 by Patrick Panchal RN  Outcome: Progressing  Flowsheets (Taken 2/6/2025 2000)  Skin Integrity Remains Intact:   Monitor for areas of redness and/or skin breakdown   Every 4-6 hours minimum: Change oxygen saturation probe site   Every 4-6 hours: If on nasal continuous positive airway pressure, respiratory therapy assesses nares and determine need for appliance change or resting period  2/6/2025 1831 by Whitney Christopher RN  Outcome: Not Progressing  Flowsheets (Taken 2/6/2025 1831)  Skin Integrity Remains Intact: Monitor for areas of redness and/or skin breakdown     Problem: Safety - Adult  Goal: Free from fall injury  2/6/2025 2053 by Patrick Panchal RN  Outcome: Progressing  Flowsheets (Taken 2/6/2025 2052)  Free From Fall Injury: Instruct family/caregiver on patient safety  2/6/2025 1831 by Whitney Christopher RN  Outcome: Progressing  Flowsheets (Taken 2/6/2025 1831)  Free From Fall Injury: Instruct family/caregiver on patient safety     Problem: ABCDS Injury Assessment  Goal: Absence of physical injury  2/6/2025 2053 by Patrick Panchal, RN  Outcome: Progressing  Flowsheets (Taken 2/6/2025 2052)  Absence of Physical Injury: Implement safety measures based on patient assessment  2/6/2025 1831 by Whitney Christopher,  positive airway pressure, respiratory therapy assess nares and determine need for appliance change or resting period  2/6/2025 2053 by Patrick Panchal RN  Outcome: Progressing  Flowsheets (Taken 2/6/2025 2000)  Skin Integrity Remains Intact:   Monitor for areas of redness and/or skin breakdown   Every 4-6 hours minimum: Change oxygen saturation probe site   Every 4-6 hours: If on nasal continuous positive airway pressure, respiratory therapy assesses nares and determine need for appliance change or resting period  2/6/2025 1831 by Whitney Christopher RN  Outcome: Not Progressing  Flowsheets (Taken 2/6/2025 1831)  Skin Integrity Remains Intact: Monitor for areas of redness and/or skin breakdown     Problem: Skin/Tissue Integrity - Adult  Goal: Skin integrity remains intact  Description: 1.  Monitor for areas of redness and/or skin breakdown  2.  Assess vascular access sites hourly  3.  Every 4-6 hours minimum:  Change oxygen saturation probe site  4.  Every 4-6 hours:  If on nasal continuous positive airway pressure, respiratory therapy assess nares and determine need for appliance change or resting period  2/6/2025 2053 by Patrick Panchal RN  Outcome: Progressing  Flowsheets (Taken 2/6/2025 2000)  Skin Integrity Remains Intact:   Monitor for areas of redness and/or skin breakdown   Every 4-6 hours minimum: Change oxygen saturation probe site   Every 4-6 hours: If on nasal continuous positive airway pressure, respiratory therapy assesses nares and determine need for appliance change or resting period  2/6/2025 1831 by Whitney Christopher RN  Outcome: Not Progressing  Flowsheets (Taken 2/6/2025 1831)  Skin Integrity Remains Intact: Monitor for areas of redness and/or skin breakdown

## 2025-02-07 NOTE — PROGRESS NOTES
Called patients RN and told her thyroid FNA's are to be scheduled as outpatients only.  RN to pass on to ordering physician.

## 2025-02-07 NOTE — PLAN OF CARE
Problem: Discharge Planning  Goal: Discharge to home or other facility with appropriate resources  Outcome: Progressing     Problem: Skin/Tissue Integrity  Goal: Skin integrity remains intact  Description: 1.  Monitor for areas of redness and/or skin breakdown  2.  Assess vascular access sites hourly  3.  Every 4-6 hours minimum:  Change oxygen saturation probe site  4.  Every 4-6 hours:  If on nasal continuous positive airway pressure, respiratory therapy assess nares and determine need for appliance change or resting period  Outcome: Progressing     Problem: Safety - Adult  Goal: Free from fall injury  Outcome: Progressing     Problem: ABCDS Injury Assessment  Goal: Absence of physical injury  Outcome: Progressing     Problem: Neurosensory - Adult  Goal: Achieves stable or improved neurological status  Outcome: Progressing  Goal: Achieves maximal functionality and self care  Outcome: Progressing     Problem: Respiratory - Adult  Goal: Achieves optimal ventilation and oxygenation  Outcome: Progressing     Problem: Cardiovascular - Adult  Goal: Maintains optimal cardiac output and hemodynamic stability  Outcome: Progressing     Problem: Skin/Tissue Integrity - Adult  Goal: Skin integrity remains intact  Description: 1.  Monitor for areas of redness and/or skin breakdown  2.  Assess vascular access sites hourly  3.  Every 4-6 hours minimum:  Change oxygen saturation probe site  4.  Every 4-6 hours:  If on nasal continuous positive airway pressure, respiratory therapy assess nares and determine need for appliance change or resting period  Outcome: Progressing     Problem: Musculoskeletal - Adult  Goal: Return mobility to safest level of function  Outcome: Progressing     Problem: Gastrointestinal - Adult  Goal: Maintains or returns to baseline bowel function  Outcome: Progressing     Problem: Metabolic/Fluid and Electrolytes - Adult  Goal: Electrolytes maintained within normal limits  Outcome: Progressing

## 2025-02-07 NOTE — PROGRESS NOTES
RBC 4.80 4.59 4.35   HGB 12.7 11.9 11.5   HCT 42.2 39.7 37.1   MCV 87.9 86.5 85.3   MCH 26.5 25.9* 26.4   MCHC 30.1* 30.0* 31.0*   RDW 16.3* 16.2* 15.8*    228 208   MPV 10.2 10.4 10.8     Comprehensive :   Recent Labs     02/06/25  0900 02/07/25  0429    140   K 3.8 4.0    104   CO2 23 20*   BUN 31* 28*   CREATININE 1.0 0.9   GLUCOSE 146* 254*   CALCIUM 8.9 8.8   BILITOT 0.6 0.3   ALKPHOS 145* 128*   AST 37* 14   ALT 30 21     Cardiac :   Lab Results   Component Value Date    TROPONINI <0.01 07/02/2016     Lab Results   Component Value Date    PROBNP 187 02/06/2025    PROBNP 148 (H) 03/03/2022     PRO-BNP : No results for input(s): \"BNP\" in the last 72 hours.   BNP: No results for input(s): \"BNP\" in the last 72 hours.  Lactic Acid : @BRIEFLAB(LACTA:3)    Lipase  : No results for input(s): \"LIPASE\" in the last 72 hours.    Sed rate : No results found for: \"SEDRATE\"  CReactive Protein:   CRP   Date Value Ref Range Status   03/05/2022 3.3 (H) 0.0 - 0.4 mg/dL Final   03/04/2022 6.9 (H) 0.0 - 0.4 mg/dL Final     Globulins :   No results for input(s): \"IGG\" in the last 72 hours.  No results for input(s): \"IGM\" in the last 72 hours.  No results for input(s): \"IGA\" in the last 72 hours.  No results for input(s): \"IGG1\" in the last 72 hours.  No results for input(s): \"IGG2\" in the last 72 hours.  No results for input(s): \"IGG3\" in the last 72 hours.  No results for input(s): \"IGG4\" in the last 72 hours.       Magnesium  Lab Results   Component Value Date/Time    MG 2.3 02/07/2025 04:29 AM     Phosphorus  Lab Results   Component Value Date/Time    PHOS 2.6 02/07/2025 04:29 AM     Ionized CalciumNo results found for: \"CAION\"     COVID-19 Labs:  Lab Results   Component Value Date/Time    COVID19 Not Detected 02/06/2025 09:00 AM       Notable Cultures:    Blood cultures   Blood Culture, Routine   Date Value Ref Range Status   03/03/2022 5 Days no growth  Final    No results for input(s): \"BC\" in the last 72  FINDINGS: There is new prominence of the left thyroid lobe with mild rightward deviation of the trachea at the thoracic inlet. There is satisfactory pulmonary arterial contrast opacification with a saddle embolus in the distal right main pulmonary artery seen on image 77, series 301.  There are multiple occlusive emboli in the proximal portions of right lower lobe segmental arteries. There is nonocclusive embolus within the left lower lobe medial basilar segmental artery. There is no evidence of right ventricular strain.  There are coronary artery calcifications.  Thoracic aortic caliber is normal.  There are no enlarged mediastinal or hilar lymph nodes.  There is mild right basilar atelectasis. There is no pulmonary infarct.  Left lung is clear.     1. Study is positive for pulmonary embolic disease with a saddle embolus in the distal main right pulmonary artery extending into the right upper and right lower lobes as well as multiple right lower lobe segmental emboli. Solitary embolus in the left lower lobe medial basilar segmental artery. 2. No evidence of pulmonary infarct or right ventricular strain. The above findings were called to Lilly Jacques certified nurse practitioner at 10:30 a.m. on 02/06/2025.       All Others since admission  US THYROID    Result Date: 2/6/2025  1.  Heterogeneous and hypervascular thyroid gland.  Please correlate with the patient's thyroid function test. 2.  Bilateral thyroid nodules.  The 43 mm left thyroid TR 3 nodule meets criteria for FNA.  19 mm TR 4 nodule meets criteria for FNA. ACR TI-RADS recommendations: TR5 (>= 7 points):  FNA if >= 1 cm; follow-up if 0.5-0.9 cm in 1, 2, 3, 4, and 5 years TR4 (4-6 points):  FNA if >= 1.5 cm; follow-up if 1.0-1.4 cm in 1, 2, 3, and 5 years TR3 (3 points):  FNA if >= 2.5 cm; follow-up if 1.5-2.4 cm in 1, 3, and 5 years TR2 (2 points):  No FNA or follow-up TR1 (0 points):  No FNA or follow-up ACR TI-RADS recommends that no more than two

## 2025-02-07 NOTE — H&P
Internal Medicine History & Physical     Name: Chery Hayden  : 1950  Chief Complaint: Cold Symptoms (Patient c/o URI x 2 weeks. Still wheezing. Denies feeling short of breath. Per family O2 sat low. Arrives on 2L NC. No O2 at baseline. )  Primary Care Physician: Stan Leija DO  Admission date: 2025  Date of service: 2025     History of Present Illness  Chery is a 74 y.o. year old female with a PMH of who presented with a chief complaint of roughly 2 weeks of URI symptoms.  Patient has been having concerns of shortness of breath, generalized fatigue.  Patient does have multiple recent sick contacts as multiple family members have the flu.  Patient has granddaughter at home who is a nurse who checked patient's SpO2 which was seen to be 84% at home and patient was subsequently brought to the ED.    In the ED patient seemed to be 88% on room air and improved to 92% on 4 L nasal cannula.  Other vitals include BP: 130/56 RR: 22, HR: 79, temp: 97.8.  Overall CMP WNL.  WBC: 13.0,  Troponin 46 > 41, BNP: 187 respiratory panel negative. CTA Chest: Study is positive for pulmonary embolic disease with a saddle embolus in the distal main right pulmonary artery extending into the right upper and right lower lobes as well as multiple right lower lobe segmental emboli. Solitary embolus in the left lower lobe medial basilar segmental artery.    The patient was admitted for saddle PE        Past Medical History:   Diagnosis Date    Arthritis     Bleeding ulcer     Hypertension        Past Surgical History:   Procedure Laterality Date     SECTION      CHOLECYSTECTOMY, LAPAROSCOPIC  2016    ERCP  2016    HYSTERECTOMY (CERVIX STATUS UNKNOWN)      JOINT REPLACEMENT Left     left knee       No family history on file.      Social History  Illicit drugs: Denies   TOBACCO:   reports that she has never smoked. She has never used smokeless tobacco.  ETOH:   reports no history of alcohol

## 2025-02-07 NOTE — CONSULTS
[facility]  Hematology/Oncology  Consult      Patient Name: Chery Hayden  YOB: 1950  PCP: Stan Leija DO   Referring Provider:      Reason for Consultation:   Chief Complaint   Patient presents with    Cold Symptoms     Patient c/o URI x 2 weeks. Still wheezing. Denies feeling short of breath. Per family O2 sat low. Arrives on 2L NC. No O2 at baseline.         History of Present Illness: This is a 74-year-old female patient  with a PMH of HTN and a bleeding ulcer who presented to the ED for evaluation of cold-like symptoms and shortness of breath. CTA pulmonary showed pulmonary embolic disease with a saddle embolus in the distal main right pulmonary artery extending into the right upper and right lower lobes as well as multiple right lower lobe segmental emboli. Solitary embolus in the left lower lobe medial basilar segmental artery. No evidence of pulmonary infarct or right ventricular strain. BL LE dopplers showed no evidence of DVT in either lower extremity. She was started on a heparin drip which has been transitioned to Eliquis and she was admitted to the MICU for further management. ECHO with EF of 65-70%. Vscular surgery consulted for possible need of ECOS. Pulmonology consutled for PE. Endocrinology consulted for L thyroid nodule. US thyroid showed Heterogeneous and hypervascular thyroid gland.  Please correlate with the patient's thyroid function test. Bilateral thyroid nodules.  The 43 mm left thyroid TR 3 nodule meets criteria for FNA.  19 mm TR 4 nodule meets criteria for FNA. Further recommendations pending.  CMP with BUN 28, GFR 65. ProBNP WNL, Troponin 41. LFT's with Alk Phos 128. CBC with ANC 8.46 otherwise unremarkable. Consultation for unprovoked PE.           Diagnostic Data:     Past Medical History:   Diagnosis Date    Arthritis     Bleeding ulcer     Hypertension        Patient Active Problem List    Diagnosis Date Noted    Pulmonary embolism, bilateral  of breath.   - CTA pulmonary showed pulmonary embolic disease with a saddle embolus in the distal main right pulmonary artery extending into the right upper and right lower lobes as well as multiple right lower lobe segmental emboli. Solitary embolus in the left lower lobe medial basilar segmental artery. No evidence of pulmonary infarct or right ventricular strain.   - BL LE dopplers showed no evidence of DVT in either lower extremity.   - Heparin drip which has been transitioned to Eliquis   - Admitted to the MICU for further management.   - ECHO with EF of 65-70%.   - Vascular surgery consulted for possible need of ECOS, recommendations pending  - Pulmonology consutled for PE, recommendations pending.  - Endocrinology consulted for L thyroid nodule. US thyroid showed Heterogeneous and hypervascular thyroid gland.  TSH 1.09. Bilateral thyroid nodules.  The 43 mm left thyroid TR 3 nodule meets criteria for FNA.  19 mm TR 4 nodule meets criteria for FNA. Further recommendations pending.    - CMP with BUN 28, GFR 65. ProBNP WNL, Troponin 41. LFT's with Alk Phos 128. CBC with ANC 8.46 otherwise unremarkable.     Consultation for unprovoked PE.   Attending addendum:  Bilateral pulmonary emboli without right heart strain.  Had been on heparin drip and now transitioned to Eliquis  Hemodynamically stable and doubt that she will need EKOS,  The fact that her PE was unprovoked and she had significant clot burden thrombophilia panel will be done as well as anticardiolipin antibody titers.    Thank you for the consult, will follow.    DESMOND Newman - CNP  Electronically signed 2/7/2025 at 11:17 AM  Patient seen and examined, note edited  Ja Ibrahim MD

## 2025-02-07 NOTE — CARE COORDINATION
Care Coordination: LOS 1 day. Met with pt and Son at bedside to discuss transition of care needs. Lives with brother Haim and Khoi- they are not there 24/7. Grand daughter Helen is RN.  Ramp to home, stays on main floor, uses a walker to ambulate No hx of hhc, or haider. They are interested in hhc, no preference, declined list- options reviewed and referral made to Darline at [facility] to review for SN,ptx and otx. She will let me know if amble to accept. If home 02 needed they have no preference of DME. Plan is home at discharge and family can transport. Follows with Dr Leija. Admitted with B/L PE. Heparin gtt- plan to switch to Eliquis, solumedrol. Eliquis coupon provided to pt and family- will follow    Electronically signed by Clotilde Choudhary RN on 2/7/2025 at 3:35 PM       ADDENDUM: Darline from [facility] can accept, will need orders at discharge  Electronically signed by Clotilde Choudhary RN on 2/7/2025 at 3:54 PM   The Plan for Transition of Care is related to the following treatment goals: dc    The Patient and/or patient representative son was provided with a choice of provider and agrees   with the discharge plan. [x] Yes [] No    Freedom of choice list was provided with basic dialogue that supports the patient's individualized plan of care/goals, treatment preferences and shares the quality data associated with the providers. [x] Yes [] No

## 2025-02-07 NOTE — ACP (ADVANCE CARE PLANNING)
Advance Care Planning   Healthcare Decision Maker:    Primary Decision Maker: Chaim Hayden - Child - [phone]    Secondary Decision Maker: Helen Bailey - Grandchild - [phone]    Click here to complete Healthcare Decision Makers including selection of the Healthcare Decision Maker Relationship (ie \"Primary\").

## 2025-02-08 LAB
ALBUMIN SERPL-MCNC: 3.3 G/DL (ref 3.5–5.2)
ALP SERPL-CCNC: 108 U/L (ref 35–104)
ALT SERPL-CCNC: 16 U/L (ref 0–32)
ANION GAP SERPL CALCULATED.3IONS-SCNC: 12 MMOL/L (ref 7–16)
AST SERPL-CCNC: 11 U/L (ref 0–31)
BASOPHILS # BLD: 0 K/UL (ref 0–0.2)
BASOPHILS NFR BLD: 0 % (ref 0–2)
BILIRUB SERPL-MCNC: <0.2 MG/DL (ref 0–1.2)
BUN SERPL-MCNC: 38 MG/DL (ref 6–23)
CALCIUM SERPL-MCNC: 8.6 MG/DL (ref 8.6–10.2)
CHLORIDE SERPL-SCNC: 103 MMOL/L (ref 98–107)
CO2 SERPL-SCNC: 22 MMOL/L (ref 22–29)
CREAT SERPL-MCNC: 0.9 MG/DL (ref 0.5–1)
EOSINOPHIL # BLD: 0 K/UL (ref 0.05–0.5)
EOSINOPHILS RELATIVE PERCENT: 0 % (ref 0–6)
ERYTHROCYTE [DISTWIDTH] IN BLOOD BY AUTOMATED COUNT: 15.9 % (ref 11.5–15)
GFR, ESTIMATED: 69 ML/MIN/1.73M2
GLUCOSE SERPL-MCNC: 304 MG/DL (ref 74–99)
HCT VFR BLD AUTO: 35.8 % (ref 34–48)
HGB BLD-MCNC: 10.9 G/DL (ref 11.5–15.5)
IMM GRANULOCYTES # BLD AUTO: 0.08 K/UL (ref 0–0.58)
IMM GRANULOCYTES NFR BLD: 1 % (ref 0–5)
LYMPHOCYTES NFR BLD: 0.24 K/UL (ref 1.5–4)
LYMPHOCYTES RELATIVE PERCENT: 2 % (ref 20–42)
MAGNESIUM SERPL-MCNC: 2.3 MG/DL (ref 1.6–2.6)
MCH RBC QN AUTO: 26.3 PG (ref 26–35)
MCHC RBC AUTO-ENTMCNC: 30.4 G/DL (ref 32–34.5)
MCV RBC AUTO: 86.3 FL (ref 80–99.9)
MONOCYTES NFR BLD: 0.25 K/UL (ref 0.1–0.95)
MONOCYTES NFR BLD: 2 % (ref 2–12)
NEUTROPHILS NFR BLD: 94 % (ref 43–80)
NEUTS SEG NFR BLD: 9.64 K/UL (ref 1.8–7.3)
PARTIAL THROMBOPLASTIN TIME: 72.6 SEC (ref 24.5–35.1)
PARTIAL THROMBOPLASTIN TIME: 94.2 SEC (ref 24.5–35.1)
PHOSPHATE SERPL-MCNC: 3 MG/DL (ref 2.5–4.5)
PLATELET # BLD AUTO: 208 K/UL (ref 130–450)
PMV BLD AUTO: 11 FL (ref 7–12)
POTASSIUM SERPL-SCNC: 4.1 MMOL/L (ref 3.5–5)
PROT SERPL-MCNC: 5.8 G/DL (ref 6.4–8.3)
RBC # BLD AUTO: 4.15 M/UL (ref 3.5–5.5)
RBC # BLD: ABNORMAL 10*6/UL
SODIUM SERPL-SCNC: 137 MMOL/L (ref 132–146)
T4 FREE SERPL-MCNC: 1.3 NG/DL (ref 0.9–1.7)
WBC OTHER # BLD: 10.2 K/UL (ref 4.5–11.5)

## 2025-02-08 PROCEDURE — 6370000000 HC RX 637 (ALT 250 FOR IP): Performed by: NURSE PRACTITIONER

## 2025-02-08 PROCEDURE — 6360000002 HC RX W HCPCS: Performed by: INTERNAL MEDICINE

## 2025-02-08 PROCEDURE — 85730 THROMBOPLASTIN TIME PARTIAL: CPT

## 2025-02-08 PROCEDURE — 99232 SBSQ HOSP IP/OBS MODERATE 35: CPT | Performed by: INTERNAL MEDICINE

## 2025-02-08 PROCEDURE — 80053 COMPREHEN METABOLIC PANEL: CPT

## 2025-02-08 PROCEDURE — 51798 US URINE CAPACITY MEASURE: CPT

## 2025-02-08 PROCEDURE — 85025 COMPLETE CBC W/AUTO DIFF WBC: CPT

## 2025-02-08 PROCEDURE — 2700000000 HC OXYGEN THERAPY PER DAY

## 2025-02-08 PROCEDURE — 6370000000 HC RX 637 (ALT 250 FOR IP): Performed by: PHYSICIAN ASSISTANT

## 2025-02-08 PROCEDURE — 2500000003 HC RX 250 WO HCPCS: Performed by: STUDENT IN AN ORGANIZED HEALTH CARE EDUCATION/TRAINING PROGRAM

## 2025-02-08 PROCEDURE — 99291 CRITICAL CARE FIRST HOUR: CPT | Performed by: INTERNAL MEDICINE

## 2025-02-08 PROCEDURE — 83735 ASSAY OF MAGNESIUM: CPT

## 2025-02-08 PROCEDURE — 6370000000 HC RX 637 (ALT 250 FOR IP): Performed by: INTERNAL MEDICINE

## 2025-02-08 PROCEDURE — 99223 1ST HOSP IP/OBS HIGH 75: CPT | Performed by: SURGERY

## 2025-02-08 PROCEDURE — 94640 AIRWAY INHALATION TREATMENT: CPT

## 2025-02-08 PROCEDURE — 84100 ASSAY OF PHOSPHORUS: CPT

## 2025-02-08 PROCEDURE — 2060000000 HC ICU INTERMEDIATE R&B

## 2025-02-08 RX ADMIN — AMLODIPINE BESYLATE 5 MG: 5 TABLET ORAL at 08:24

## 2025-02-08 RX ADMIN — HEPARIN SODIUM 18 UNITS/KG/HR: 10000 INJECTION, SOLUTION INTRAVENOUS at 08:44

## 2025-02-08 RX ADMIN — APIXABAN 10 MG: 5 TABLET, FILM COATED ORAL at 20:34

## 2025-02-08 RX ADMIN — IPRATROPIUM BROMIDE AND ALBUTEROL SULFATE 1 DOSE: 2.5; .5 SOLUTION RESPIRATORY (INHALATION) at 16:06

## 2025-02-08 RX ADMIN — IPRATROPIUM BROMIDE AND ALBUTEROL SULFATE 1 DOSE: 2.5; .5 SOLUTION RESPIRATORY (INHALATION) at 08:04

## 2025-02-08 RX ADMIN — APIXABAN 10 MG: 5 TABLET, FILM COATED ORAL at 10:54

## 2025-02-08 RX ADMIN — IPRATROPIUM BROMIDE AND ALBUTEROL SULFATE 1 DOSE: 2.5; .5 SOLUTION RESPIRATORY (INHALATION) at 21:22

## 2025-02-08 RX ADMIN — PETROLATUM: 420 OINTMENT TOPICAL at 08:24

## 2025-02-08 RX ADMIN — LISINOPRIL 20 MG: 20 TABLET ORAL at 08:24

## 2025-02-08 RX ADMIN — SODIUM CHLORIDE, PRESERVATIVE FREE 10 ML: 5 INJECTION INTRAVENOUS at 20:34

## 2025-02-08 RX ADMIN — PANTOPRAZOLE SODIUM 40 MG: 40 TABLET, DELAYED RELEASE ORAL at 06:00

## 2025-02-08 RX ADMIN — IPRATROPIUM BROMIDE AND ALBUTEROL SULFATE 1 DOSE: 2.5; .5 SOLUTION RESPIRATORY (INHALATION) at 13:41

## 2025-02-08 RX ADMIN — PETROLATUM: 420 OINTMENT TOPICAL at 20:34

## 2025-02-08 ASSESSMENT — PAIN SCALES - GENERAL
PAINLEVEL_OUTOF10: 0

## 2025-02-08 NOTE — PROGRESS NOTES
4 Eyes Skin Assessment     NAME:  Chery Hayden  YOB: 1950  MEDICAL RECORD NUMBER:  [id number]    The patient is being assessed for  Transfer to New Unit    I agree that at least one RN has performed a thorough Head to Toe Skin Assessment on the patient. ALL assessment sites listed below have been assessed.      Areas assessed by both nurses:    Head, Face, Ears, Shoulders, Back, Chest, Arms, Elbows, Hands, Sacrum. Buttock, Coccyx, Ischium, Legs. Feet and Heels, and Under Medical Devices         Does the Patient have a Wound? Yes wound(s) were present on assessment. LDA wound assessment was Initiated and completed by RN Wound noted to L buttocks (see documentation)       Moose Prevention initiated by RN: Yes  Wound Care Orders initiated by RN: Yes    Pressure Injury (Stage 3,4, Unstageable, DTI, NWPT, and Complex wounds) if present, place Wound referral order by RN under : Yes    New Ostomies, if present place, Ostomy referral order under : No     Nurse 1 eSignature: Electronically signed by Tara M Wilms, RN on 2/8/25 at 6:26 PM EST    **SHARE this note so that the co-signing nurse can place an eSignature**    Nurse 2 eSignature: {Esignature:[id number]}

## 2025-02-08 NOTE — PROGRESS NOTES
Internal Medicine Progress Note    Patient's name: Chery Hayden  : 1950  Chief complaints (on day of admission): Cold Symptoms (Patient c/o URI x 2 weeks. Still wheezing. Denies feeling short of breath. Per family O2 sat low. Arrives on 2L NC. No O2 at baseline. )  Admission date: 2025  Date of service: 2025   Room: [address]  Primary care physician: Stan Leija DO  Reason for visit: Follow-up for:  SOB    Subjective  Chery was seen and examined.    Review of Systems NEG x 10. NO SOB  There are no new complaints of chest pain, shortness of breath, abdominal pain, nausea, vomiting, diarrhea, constipation.    Hospital Medications  Current Facility-Administered Medications   Medication Dose Route Frequency Provider Last Rate Last Admin    apixaban (ELIQUIS) tablet 10 mg  10 mg Oral BID Juanpablo Hansen MD   10 mg at 25 1054    Followed by    [START ON 2/15/2025] apixaban (ELIQUIS) tablet 5 mg  5 mg Oral BID Juanpablo Hansen MD        amLODIPine (NORVASC) tablet 5 mg  5 mg Oral Daily Juanpablo Hansen MD   5 mg at 25 0824    lisinopril (PRINIVIL;ZESTRIL) tablet 20 mg  20 mg Oral Daily Juanpablo Hansen MD   20 mg at 25 0824    pantoprazole (PROTONIX) tablet 40 mg  40 mg Oral QAM AC Uriarte, Michelle, APRN - CNP   40 mg at 25 0600    ipratropium 0.5 mg-albuterol 2.5 mg (DUONEB) nebulizer solution 1 Dose  1 Dose Inhalation Q4H WA RT Lilly Jacques PA-C   1 Dose at 25 1341    sodium chloride flush 0.9 % injection 5-40 mL  5-40 mL IntraVENous 2 times per day Markel Kelly MD   10 mL at 25 2045    sodium chloride flush 0.9 % injection 5-40 mL  5-40 mL IntraVENous PRN Markel Kelly MD        0.9 % sodium chloride infusion   IntraVENous PRN Markel Kelly MD        potassium chloride (KLOR-CON M) extended release tablet 40 mEq  40 mEq Oral PRN Markel Kelly MD        Or    potassium bicarb-citric acid (EFFER-K) effervescent tablet 40 mEq  40 mEq Oral PRN Leticia  well as multiple right lower lobe segmental emboli.   Solitary embolus in the left lower lobe medial basilar segmental artery.   2. No evidence of pulmonary infarct or right ventricular strain.   The above findings were called to Lilly Jacques certified nurse   practitioner at 10:30 a.m. on 02/06/2025.         IR INTERVENTIONAL RADIOLOGY PROCEDURE REQUEST    (Results Pending)       Echocardiogram      Assessment   Active Hospital Problems    Diagnosis     Multinodular goiter (nontoxic) [E04.2]     Primary hypertension [I10]     Pulmonary embolism, bilateral (HCC) [I26.99]          CONSULTS:  IP CONSULT TO CRITICAL CARE  IP CONSULT TO INTERNAL MEDICINE  IP CONSULT TO ENDOCRINOLOGY  IP CONSULT TO PULMONOLOGY  IP CONSULT TO VASCULAR SURGERY  IP CONSULT TO HEM/ONC  IP CONSULT TO HOME CARE NEEDS  Plan  BL PE(no saddle)   Low risk PE, on loading eliquis   Cont O2   Crit care, vasc,Pulm input noted   LEs neg us for DVT  PT AM-PAC--   OT AM- PAC--   Pulmonary hypertension   Monitor  Bilateral thyroid Nodule    Can w/u as OP(ENDO CONSULTED)  Morbid obesity  Obesity hypoventilation   ?bipap per Pulm, OP sleep study  Follow labs   DVT prophylaxis-eliquis  Please see orders for further management and care.  Discharge plan: TBD      Electronically signed by Geronimo Rushing MD on 2/8/2025 at 2:08 PM    I can be reached through [employer].

## 2025-02-08 NOTE — CONSULTS
Vascular Surgery Consultation Note    Reason for Consult: Bilateral pulmonary embolism    HPI:    This is a 74 y.o. female who is admitted to the hospital for treatment of bilateral PE.  The patient presented to the emergency department on 2/6/2025 for evaluation of 2 weeks of shortness of breath and fatigue.  She had recent contact with multiple family members with influenza.  The patient was apparently hypoxic at home, and sent into the emergency department for further evaluation.  She was found to have bilateral unprovoked pulmonary embolism.  The patient was started on a heparin drip, and has since been transitioned to Eliquis.  Echocardiogram showed mildly dilated right ventricle without other sign of heart strain.  Troponin has been stable in the 40s.  Bilateral lower extremity ultrasounds have been negative for DVT.  The patient has remained hemodynamically stable.  She is now on 2 L of oxygen.  She denies additional symptoms of shortness of breath or lower extremity pain.    The patient uses a walker to ambulate.  She denies prior vascular intervention.  She does have lymphedema in both lower extremities.  She has not been on a blood thinner recently.  She has remote history of peptic ulcer, but denies recent issues regarding this.      ROS: Negative if blank [], Positive if [x]  General Vascular   [] Fevers [] Claudication (Blocks)   [] Chills [] Rest Pain   [] Weight Loss [] Tissue Loss   [] Chest Pain [] Clotting Disorder   [x] SOB at rest [x] Leg Swelling   [x] SOB with exertion [x] DVT/PE      [] Nausea    [] Vomiting [] Stroke/TIA   [] Abdominal Pain [] Focal weakness   [] Melena [] Slurred Speech   [] Hematochezia [] Vision Changes   [] Hematuria    [] Dysuria [] Hx of Central Catheters   [] Wears Glasses/Contacts [] Dialysis: never   [] Blindness    [x] right Hand Dominant   [] Difficulty swallowing        Past Medical History:   Diagnosis Date    Arthritis     Bleeding ulcer     Hypertension        left lower lobe medial basilar segmental artery. There is no evidence of right ventricular strain.  There are coronary artery calcifications.  Thoracic aortic caliber is normal.  There are no enlarged mediastinal or hilar lymph nodes.  There is mild right basilar atelectasis. There is no pulmonary infarct.  Left lung is clear.     1. Study is positive for pulmonary embolic disease with a saddle embolus in the distal main right pulmonary artery extending into the right upper and right lower lobes as well as multiple right lower lobe segmental emboli. Solitary embolus in the left lower lobe medial basilar segmental artery. 2. No evidence of pulmonary infarct or right ventricular strain. The above findings were called to Lilly Jacques certified nurse practitioner at 10:30 a.m. on 02/06/2025.         Assesment/Plan  74 y.o. female with bilateral pulmonary embolism.  No evidence of hemodynamic instability.  No evidence for lower extremity DVT on ultrasound.    -No plans for intervention from vascular surgery standpoint at this time  -Continue anticoagulation per consultant teams  -Okay for diet from vascular surgery standpoint  -Please call with any further questions or concerns    Discussed with Dr. Mauricio Garrison DO  2/8/25  11:02 AM EST    I saw and examined the patient.  I reviewed the record and radiology studies.  I discussed the patient with Dr. Garrison and agree with the report.  No strong indication for CDT.  Agree with [facility].  OK for transfer from vascular standpoint.    Kaveh Martínez MD    Approximately spent 75 minutes treating this patient including  reviewing the laboratory, imaging, and clinical findings electronic documentation.  I have discussed the clinical implications and recommendations. More than 50% of time was spent counseling patient. The patient verbalized understanding.

## 2025-02-08 NOTE — PROGRESS NOTES
[facility]  PULMONARY/CRITICAL CARE PROGRESS NOTE    Patient: Chery Hayden  MRN: [id number]  : 1950    Encounter Date: 2025  Encounter Time: 10:23 AM     Date of Admission: .2025 10:40 AM    Consulting Physician:  Primary Care Physician:     Stan Leija DO     [phone]    Reason for Consultation: Pulmonary Embolism     Problem List:  Patient Active Problem List   Diagnosis    Pancreatitis, acute    Non-pressure chronic ulcer of other part of right lower leg with fat layer exposed (HCC)    Non-pressure chronic ulcer of lower leg, left, with fat layer exposed (HCC)    Venous stasis ulcer with edema of lower leg (HCC)    Acute hypoxemic respiratory failure due to COVID-19    Hypokalemia    Morbid obesity with BMI of 45.0-49.9, adult    Abnormal urinalysis    Acute bronchospasm due to viral infection    Pulmonary embolism, bilateral (HCC)    Multinodular goiter (nontoxic)    Primary hypertension     SUBJECTIVE: Patient seen and examined.  Overnight the patient had no shortness of breath, cough, increased work of breathing.    HOME MEDICATIONS:  Prior to Admission medications    Medication Sig Start Date End Date Taking? Authorizing Provider   diclofenac sodium (VOLTAREN) 1 % GEL Apply 2 g topically 4 times daily  Patient not taking: Reported on 2025   Araceli Urena, APRN - CNP   albuterol sulfate HFA (VENTOLIN HFA) 108 (90 Base) MCG/ACT inhaler Please use 4 times daily scheduled for first 3-4 days after discharge, and then every 4 hours as needed for wheezing afterwards.  USE WITH SPACER.  Pharmacy: Please dispense with spacer.  Patient not taking: Reported on 2025 3/5/22   Shefali Jackson MD   amLODIPine (NORVASC) 5 MG tablet Take 1 tablet by mouth daily    ProviderGaviota MD   allopurinol (ZYLOPRIM) 300 MG tablet Take by mouth 21   Provider, MD Gaviota   HYDROcodone-acetaminophen (NORCO) 5-325 MG per tablet Take 1 tablet by  mouth every 8 hours as needed for Pain.  Patient not taking: Reported on 2/6/2025    Gaviota Coulter MD   furosemide (LASIX) 40 MG tablet Take 1 tablet by mouth daily Indications: taking as needed    Gaviota Coulter MD   potassium chloride (KLOR-CON) 20 MEQ packet Take 20 mEq by mouth daily Indications: taking as needed    Gaviota Coulter MD   lisinopril-hydroCHLOROthiazide (PRINZIDE;ZESTORETIC) 20-25 MG per tablet Take 1 tablet by mouth daily    Gaviota Coulter MD   acetaminophen (TYLENOL) 500 MG tablet Take 1 tablet by mouth every 6 hours as needed for Pain    Gaviota Coulter MD   pantoprazole (PROTONIX) 40 MG tablet Take 1 tablet by mouth every morning (before breakfast)  Patient not taking: Reported on 2/6/2025 7/5/16   Rabia Ahn DO   vitamin B-12 (CYANOCOBALAMIN) 100 MCG tablet Take 0.5 tablets by mouth daily    Gaviota Coulter MD   calcium carbonate (OSCAL) 500 MG TABS tablet Take 2 tablets by mouth daily    ProviderGaviota MD       CURRENT MEDICATIONS:  Current Facility-Administered Medications: apixaban (ELIQUIS) tablet 10 mg, 10 mg, Oral, BID **FOLLOWED BY** [START ON 2/15/2025] apixaban (ELIQUIS) tablet 5 mg, 5 mg, Oral, BID  amLODIPine (NORVASC) tablet 5 mg, 5 mg, Oral, Daily  lisinopril (PRINIVIL;ZESTRIL) tablet 20 mg, 20 mg, Oral, Daily  pantoprazole (PROTONIX) tablet 40 mg, 40 mg, Oral, QAM AC  ipratropium 0.5 mg-albuterol 2.5 mg (DUONEB) nebulizer solution 1 Dose, 1 Dose, Inhalation, Q4H WA RT  sodium chloride flush 0.9 % injection 5-40 mL, 5-40 mL, IntraVENous, 2 times per day  sodium chloride flush 0.9 % injection 5-40 mL, 5-40 mL, IntraVENous, PRN  0.9 % sodium chloride infusion, , IntraVENous, PRN  potassium chloride (KLOR-CON M) extended release tablet 40 mEq, 40 mEq, Oral, PRN **OR** potassium bicarb-citric acid (EFFER-K) effervescent tablet 40 mEq, 40 mEq, Oral, PRN **OR** potassium chloride 10 mEq/100 mL IVPB (Peripheral Line), 10 mEq,

## 2025-02-08 NOTE — CONSULTS
ENDOCRINOLOGY INITIAL CONSULTATION NOTE    Date of Admission: 2025  Date of Service: 2025  Admitting Physician: Markel Kelly MD   Primary Care Physician: Stan Leija DO  Consultant physician: David Hill MD     Reason for the consultation:  Multinodular goiter    History of Present Illness:  The history is provided by the patient. Accuracy of the patient data is excellent.    Chery Hayden is a very pleasant 74 y.o. old female with PMH of obesity, hypertension and other listed below admitted to [facility] on 2025 because of shortness of breath and found to have PE, endocrine service was consulted for evaluation and management of multinodular goiter  Patient denies any history of fever, chills, nausea vomiting or diarrhea.  Upon arrival to the emergency department the patient was found to have a pulse ox of 88%, blood pressure 130/56, respiratory 22, heart rate of 79.  Respiratory panel was negative, CTA study was positive for pulmonary embolic disease with a saddle embolus in the distal main right pulmonary artery extending into the right upper and the lower lobe as well as multiple right lower lobe segmental emboli.  The patient was admitted to medical ICU and started on anticoagulation    CTA of the chest also incidentally showed multinodular goiter    A dedicated thyroid ultrasound on 2025 showed right thyroid lobe measuring 4.4 x 1.4 x 2.2 cm, left thyroid lobe measuring 5.3 x 3.2 x 3.4 cm, isthmus measuring 0.7 cm.  In the right thyroid lobe there is 1.9 cm solid hypoechoic thyroid nodule.  In the left thyroid lobe there is 4.3 cm solid isoechoic thyroid nodule.    Lab Results   Component Value Date/Time    TSH 1.09 2025 11:20 AM       Past Medical History   Past Medical History:   Diagnosis Date    Arthritis     Bleeding ulcer     Hypertension        Past Surgical History   Past Surgical History:   Procedure Laterality Date     SECTION      CHOLECYSTECTOMY, LAPAROSCOPIC

## 2025-02-08 NOTE — PLAN OF CARE
Problem: Skin/Tissue Integrity  Goal: Skin integrity remains intact  Description: 1.  Monitor for areas of redness and/or skin breakdown  2.  Assess vascular access sites hourly  3.  Every 4-6 hours minimum:  Change oxygen saturation probe site  4.  Every 4-6 hours:  If on nasal continuous positive airway pressure, respiratory therapy assess nares and determine need for appliance change or resting period  2/7/2025 2223 by Carlos Nash RN  Outcome: Progressing  Flowsheets (Taken 2/7/2025 2000)  Skin Integrity Remains Intact: Monitor for areas of redness and/or skin breakdown  2/7/2025 1118 by Kimberly Nelson RN  Outcome: Progressing     Problem: Neurosensory - Adult  Goal: Achieves stable or improved neurological status  2/7/2025 2223 by Carlos Nash RN  Outcome: Progressing  Flowsheets (Taken 2/7/2025 2000)  Achieves stable or improved neurological status:   Assess for and report changes in neurological status   Initiate measures to prevent increased intracranial pressure   Maintain blood pressure and fluid volume within ordered parameters to optimize cerebral perfusion and minimize risk of hemorrhage   Monitor temperature, glucose, and sodium. Initiate appropriate interventions as ordered  2/7/2025 1118 by Kimberly Nelson RN  Outcome: Progressing  Flowsheets (Taken 2/7/2025 0800)  Achieves stable or improved neurological status:   Assess for and report changes in neurological status   Maintain blood pressure and fluid volume within ordered parameters to optimize cerebral perfusion and minimize risk of hemorrhage   Monitor temperature, glucose, and sodium. Initiate appropriate interventions as ordered     Problem: Respiratory - Adult  Goal: Achieves optimal ventilation and oxygenation  2/7/2025 2223 by Carlos Nash RN  Outcome: Progressing  Flowsheets (Taken 2/7/2025 2000)  Achieves optimal ventilation and oxygenation:   Assess for changes in respiratory status   Assess for changes in mentation and  behavior  2/7/2025 1118 by Kimberly Nelson, RN  Outcome: Progressing

## 2025-02-08 NOTE — PROGRESS NOTES
Patient and patient's daughter and granddaughter at bedside notified of room change to 8514.        Nurse to nurse called to 85- all questions answered.

## 2025-02-08 NOTE — PROGRESS NOTES
[facility]             Pulmonary & Critical Care Medicine                MICU Progress Note                 Written by: Juanpablo Hansen MD  Name: Chery Hayden : 1950       Age: 74 y.o. MR/Act #    : [id number],  Billing  #    : [id number]   Admit Date: 2025 10:40 AM LOS: 2,   Hospital Day: 3 Room #      : 4404/4404-A   PCP            : Stan eLija DO,   Referred by: Geronimo Rushing MD ICU Attending: MD Mahendra Damico date: 2025 11:59 AM   ICU Days:       2 Vent Days:    0 LOS: 2,                          Reason for ICU admission           Chief Complaint   Patient presents with    Cold Symptoms     Patient c/o URI x 2 weeks. Still wheezing. Denies feeling short of breath. Per family O2 sat low. Arrives on 2L NC. No O2 at baseline.                           Brief HPI, Presentation & Synopsis                       73 yo F with Pmx of Arthritis,Bleeding ulcer, HTN presented with SOB. CTA chest is consistent with B/L PE. Heparin rip started. Troponin & BNP not elevated. Admitted in MICU for B/L PE.     Active problem list of yesterday:  Active Hospital Problems    Diagnosis Date Noted    Multinodular goiter (nontoxic) [E04.2] 2025    Primary hypertension [I10] 2025    Pulmonary embolism, bilateral (HCC) [I26.99] 2025                           Events of last night                      Continues to be on nasal cannula                      Subjective   2025               Hemodynamically stable                      Objective  2025               Vitals:    25 0900   BP: (!) 139/59   Pulse: 77   Resp: 19   Temp:    SpO2: 96%     Temperature range : Temp  Av °F (36.7 °C)  Min: 97.8 °F (36.6 °C)  Max: 98.1 °F (36.7 °C)  Pulse rate range      : Pulse  Av.4  Min: 59  Max: 99  Respiration range   : Resp  Av.2  Min: 15  Max: 29  BP range                  : Systolic (24hrs), Av , Min:83 , Max:169   ; Diastolic

## 2025-02-09 LAB
ALBUMIN SERPL-MCNC: 3.1 G/DL (ref 3.5–5.2)
ALP SERPL-CCNC: 93 U/L (ref 35–104)
ALT SERPL-CCNC: 10 U/L (ref 0–32)
ANION GAP SERPL CALCULATED.3IONS-SCNC: 12 MMOL/L (ref 7–16)
AST SERPL-CCNC: 18 U/L (ref 0–31)
BASOPHILS # BLD: 0.01 K/UL (ref 0–0.2)
BASOPHILS NFR BLD: 0 % (ref 0–2)
BILIRUB SERPL-MCNC: 0.2 MG/DL (ref 0–1.2)
BUN SERPL-MCNC: 43 MG/DL (ref 6–23)
CALCIUM SERPL-MCNC: 8 MG/DL (ref 8.6–10.2)
CHLORIDE SERPL-SCNC: 109 MMOL/L (ref 98–107)
CO2 SERPL-SCNC: 18 MMOL/L (ref 22–29)
CREAT SERPL-MCNC: 0.9 MG/DL (ref 0.5–1)
EOSINOPHIL # BLD: 0 K/UL (ref 0.05–0.5)
EOSINOPHILS RELATIVE PERCENT: 0 % (ref 0–6)
ERYTHROCYTE [DISTWIDTH] IN BLOOD BY AUTOMATED COUNT: 16.2 % (ref 11.5–15)
GFR, ESTIMATED: 70 ML/MIN/1.73M2
GLUCOSE SERPL-MCNC: 182 MG/DL (ref 74–99)
HCT VFR BLD AUTO: 35.9 % (ref 34–48)
HGB BLD-MCNC: 10.5 G/DL (ref 11.5–15.5)
IMM GRANULOCYTES # BLD AUTO: 0.07 K/UL (ref 0–0.58)
IMM GRANULOCYTES NFR BLD: 1 % (ref 0–5)
INR PPP: 1.7
LYMPHOCYTES NFR BLD: 0.32 K/UL (ref 1.5–4)
LYMPHOCYTES RELATIVE PERCENT: 3 % (ref 20–42)
MAGNESIUM SERPL-MCNC: 2.3 MG/DL (ref 1.6–2.6)
MCH RBC QN AUTO: 26.5 PG (ref 26–35)
MCHC RBC AUTO-ENTMCNC: 29.2 G/DL (ref 32–34.5)
MCV RBC AUTO: 90.7 FL (ref 80–99.9)
MONOCYTES NFR BLD: 0.53 K/UL (ref 0.1–0.95)
MONOCYTES NFR BLD: 5 % (ref 2–12)
NEUTROPHILS NFR BLD: 91 % (ref 43–80)
NEUTS SEG NFR BLD: 9.21 K/UL (ref 1.8–7.3)
PARTIAL THROMBOPLASTIN TIME: 44.7 SEC (ref 24.5–35.1)
PHOSPHATE SERPL-MCNC: 3.2 MG/DL (ref 2.5–4.5)
PLATELET CONFIRMATION: NORMAL
PLATELET, FLUORESCENCE: 175 K/UL (ref 130–450)
PMV BLD AUTO: 11.7 FL (ref 7–12)
POTASSIUM SERPL-SCNC: 4.8 MMOL/L (ref 3.5–5)
PROT SERPL-MCNC: 5.5 G/DL (ref 6.4–8.3)
PROTHROMBIN TIME: 19 SEC (ref 9.3–12.4)
RBC # BLD AUTO: 3.96 M/UL (ref 3.5–5.5)
RBC # BLD: ABNORMAL 10*6/UL
SODIUM SERPL-SCNC: 139 MMOL/L (ref 132–146)
WBC OTHER # BLD: 10.1 K/UL (ref 4.5–11.5)

## 2025-02-09 PROCEDURE — 6370000000 HC RX 637 (ALT 250 FOR IP): Performed by: PHYSICIAN ASSISTANT

## 2025-02-09 PROCEDURE — 36415 COLL VENOUS BLD VENIPUNCTURE: CPT

## 2025-02-09 PROCEDURE — 85610 PROTHROMBIN TIME: CPT

## 2025-02-09 PROCEDURE — 83735 ASSAY OF MAGNESIUM: CPT

## 2025-02-09 PROCEDURE — 85730 THROMBOPLASTIN TIME PARTIAL: CPT

## 2025-02-09 PROCEDURE — 6360000002 HC RX W HCPCS: Performed by: PHYSICIAN ASSISTANT

## 2025-02-09 PROCEDURE — 94640 AIRWAY INHALATION TREATMENT: CPT

## 2025-02-09 PROCEDURE — 2500000003 HC RX 250 WO HCPCS: Performed by: STUDENT IN AN ORGANIZED HEALTH CARE EDUCATION/TRAINING PROGRAM

## 2025-02-09 PROCEDURE — 2060000000 HC ICU INTERMEDIATE R&B

## 2025-02-09 PROCEDURE — 84100 ASSAY OF PHOSPHORUS: CPT

## 2025-02-09 PROCEDURE — 6370000000 HC RX 637 (ALT 250 FOR IP): Performed by: INTERNAL MEDICINE

## 2025-02-09 PROCEDURE — 99232 SBSQ HOSP IP/OBS MODERATE 35: CPT | Performed by: INTERNAL MEDICINE

## 2025-02-09 PROCEDURE — 85025 COMPLETE CBC W/AUTO DIFF WBC: CPT

## 2025-02-09 PROCEDURE — 80053 COMPREHEN METABOLIC PANEL: CPT

## 2025-02-09 RX ORDER — HYDRALAZINE HYDROCHLORIDE 20 MG/ML
10 INJECTION INTRAMUSCULAR; INTRAVENOUS EVERY 6 HOURS PRN
Status: DISCONTINUED | OUTPATIENT
Start: 2025-02-09 | End: 2025-02-10 | Stop reason: HOSPADM

## 2025-02-09 RX ORDER — AMLODIPINE BESYLATE 5 MG/1
5 TABLET ORAL 2 TIMES DAILY
Status: DISCONTINUED | OUTPATIENT
Start: 2025-02-09 | End: 2025-02-10 | Stop reason: HOSPADM

## 2025-02-09 RX ORDER — CALCIUM GLUCONATE 20 MG/ML
1000 INJECTION, SOLUTION INTRAVENOUS ONCE
Status: COMPLETED | OUTPATIENT
Start: 2025-02-09 | End: 2025-02-09

## 2025-02-09 RX ORDER — CALCIUM CARBONATE 500 MG/1
500 TABLET, CHEWABLE ORAL 3 TIMES DAILY
Status: DISCONTINUED | OUTPATIENT
Start: 2025-02-09 | End: 2025-02-10 | Stop reason: HOSPADM

## 2025-02-09 RX ADMIN — IPRATROPIUM BROMIDE AND ALBUTEROL SULFATE 1 DOSE: 2.5; .5 SOLUTION RESPIRATORY (INHALATION) at 20:49

## 2025-02-09 RX ADMIN — IPRATROPIUM BROMIDE AND ALBUTEROL SULFATE 1 DOSE: 2.5; .5 SOLUTION RESPIRATORY (INHALATION) at 13:09

## 2025-02-09 RX ADMIN — IPRATROPIUM BROMIDE AND ALBUTEROL SULFATE 1 DOSE: 2.5; .5 SOLUTION RESPIRATORY (INHALATION) at 08:32

## 2025-02-09 RX ADMIN — SODIUM CHLORIDE, PRESERVATIVE FREE 10 ML: 5 INJECTION INTRAVENOUS at 21:13

## 2025-02-09 RX ADMIN — AMLODIPINE BESYLATE 5 MG: 5 TABLET ORAL at 09:40

## 2025-02-09 RX ADMIN — CALCIUM CARBONATE (ANTACID) CHEW TAB 500 MG 500 MG: 500 CHEW TAB at 21:13

## 2025-02-09 RX ADMIN — CALCIUM GLUCONATE 1000 MG: 20 INJECTION, SOLUTION INTRAVENOUS at 06:33

## 2025-02-09 RX ADMIN — APIXABAN 10 MG: 5 TABLET, FILM COATED ORAL at 21:13

## 2025-02-09 RX ADMIN — PETROLATUM: 420 OINTMENT TOPICAL at 09:41

## 2025-02-09 RX ADMIN — APIXABAN 10 MG: 5 TABLET, FILM COATED ORAL at 09:39

## 2025-02-09 RX ADMIN — PETROLATUM: 420 OINTMENT TOPICAL at 21:14

## 2025-02-09 RX ADMIN — SODIUM CHLORIDE, PRESERVATIVE FREE 10 ML: 5 INJECTION INTRAVENOUS at 09:41

## 2025-02-09 RX ADMIN — AMLODIPINE BESYLATE 5 MG: 5 TABLET ORAL at 21:13

## 2025-02-09 RX ADMIN — LISINOPRIL 20 MG: 20 TABLET ORAL at 09:40

## 2025-02-09 RX ADMIN — IPRATROPIUM BROMIDE AND ALBUTEROL SULFATE 1 DOSE: 2.5; .5 SOLUTION RESPIRATORY (INHALATION) at 15:49

## 2025-02-09 NOTE — PROGRESS NOTES
Internal Medicine Progress Note    Patient's name: Chery Hayden  : 1950  Chief complaints (on day of admission): Cold Symptoms (Patient c/o URI x 2 weeks. Still wheezing. Denies feeling short of breath. Per family O2 sat low. Arrives on 2L NC. No O2 at baseline. )  Admission date: 2025  Date of service: 2025   Room: [address] [place] [address]  Primary care physician: Stan Leija DO  Reason for visit: Follow-up for:  SOB    Subjective  Chery was seen and examined.sitting in chair    Review of Systems NEG x 10. NO SOB. Wants to go home  There are no new complaints of chest pain, shortness of breath, abdominal pain, nausea, vomiting, diarrhea, constipation.    Hospital Medications  Current Facility-Administered Medications   Medication Dose Route Frequency Provider Last Rate Last Admin    apixaban (ELIQUIS) tablet 10 mg  10 mg Oral BID Juanpablo Hansen MD   10 mg at 25 0939    Followed by    [START ON 2/15/2025] apixaban (ELIQUIS) tablet 5 mg  5 mg Oral BID Juanpablo Hansen MD        amLODIPine (NORVASC) tablet 5 mg  5 mg Oral Daily Juanpablo Hansen MD   5 mg at 25 0940    lisinopril (PRINIVIL;ZESTRIL) tablet 20 mg  20 mg Oral Daily Juanpablo Hansen MD   20 mg at 25 0940    pantoprazole (PROTONIX) tablet 40 mg  40 mg Oral QAM AC Michelle Uriarte, APRN - CNP   40 mg at 25 0600    ipratropium 0.5 mg-albuterol 2.5 mg (DUONEB) nebulizer solution 1 Dose  1 Dose Inhalation Q4H WA RT Lilly Jacques PA-C   1 Dose at 25 0832    sodium chloride flush 0.9 % injection 5-40 mL  5-40 mL IntraVENous 2 times per day Markel Kelly MD   10 mL at 25 0941    sodium chloride flush 0.9 % injection 5-40 mL  5-40 mL IntraVENous PRN Markel Kelly MD        0.9 % sodium chloride infusion   IntraVENous PRN Markel Kelly MD        potassium chloride (KLOR-CON M) extended release tablet 40 mEq  40 mEq Oral PRN Markel Kelly MD        Or    potassium bicarb-citric acid (EFFER-K) effervescent tablet  WBC 10.1 02/09/2025    HGB 10.5 (L) 02/09/2025    HCT 35.9 02/09/2025     02/08/2025     02/09/2025    K 4.8 02/09/2025     (H) 02/09/2025    CREATININE 0.9 02/09/2025    BUN 43 (H) 02/09/2025    CO2 18 (L) 02/09/2025    GLUCOSE 182 (H) 02/09/2025    ALT 10 02/09/2025    AST 18 02/09/2025    INR 1.7 02/09/2025    APTT 44.7 (H) 02/09/2025    TSH 1.09 02/06/2025       US THYROID   Final Result   1.  Heterogeneous and hypervascular thyroid gland.  Please correlate with the   patient's thyroid function test.      2.  Bilateral thyroid nodules.  The 43 mm left thyroid TR 3 nodule meets   criteria for FNA.  19 mm TR 4 nodule meets criteria for FNA.      ACR TI-RADS recommendations:      TR5 (>= 7 points):  FNA if >= 1 cm; follow-up if 0.5-0.9 cm in 1, 2, 3, 4,   and 5 years      TR4 (4-6 points):  FNA if >= 1.5 cm; follow-up if 1.0-1.4 cm in 1, 2, 3, and   5 years      TR3 (3 points):  FNA if >= 2.5 cm; follow-up if 1.5-2.4 cm in 1, 3, and 5   years      TR2 (2 points):  No FNA or follow-up      TR1 (0 points):  No FNA or follow-up      ACR TI-RADS recommends that no more than two nodules with the highest ACR   TI-RADS point total should be biopsied and no more than four nodules should   be followed.      RECOMMENDATIONS:   Please correlate with patient's thyroid function test.      Recommend FNA of the thyroid nodules described.  Left thyroid nodule seen on   image 56 and right thyroid nodule seen on image 26.  If those pathologies are   benign, follow-up thyroid ultrasound in 1 year would be recommended.         Vascular duplex lower extremity venous bilateral   Final Result   No evidence of DVT in either lower extremity.         XR CHEST (2 VW)   Final Result   1. COPD.   2. No acute intrathoracic process.         CTA PULMONARY W CONTRAST   Final Result   1. Study is positive for pulmonary embolic disease with a saddle embolus in   the distal main right pulmonary artery extending into the right upper

## 2025-02-09 NOTE — PROGRESS NOTES
[facility]  Department of Internal Medicine  Division of Pulmonary, Critical Care and Sleep Medicine  Progress Note    Patient: Chery Hayden  MRN: [id number]  : 1950    Encounter Time: 11:49 AM     Date of Admission: 2025 10:40 AM    Primary Care Physician: Stan Leija DO    Primary Pulmonologist: None    Reason for Consultation: Pulmonary embolism  SUBJECTIVE\"    ok    OBJECTIVE:     PHYSICAL EXAM:   VITALS:   Vitals:    25 1930 25 2330 25 0815 25 0834   BP: (!) 151/63 (!) 135/55 (!) 184/83    Pulse: 74 76 73 70   Resp:    Temp: 97.8 °F (36.6 °C)  98.4 °F (36.9 °C)    TempSrc: Temporal  Oral    SpO2: 92% 92% 94% 93%   Weight:       Height:            Intake/Output Summary (Last 24 hours) at 2025 1149  Last data filed at 2025 1700  Gross per 24 hour   Intake 360 ml   Output 350 ml   Net 10 ml        CONSTITUTIONAL:    A&O x 3, NAD.  Patient is hirsute  SKIN:     No rash, No suspicious lesions, No skin discoloration  HEENT:     EOMI, MMM, No thrush  NECK:    No bruits, No JVP appreciated  CV:      Sinus,  No murmur, No rubs, No gallops  PULMONARY:   Breath sounds clear to auscultation bilaterally  ABDOMEN:     Soft, non-tender. BS normal. No R/R/G  EXT:    No deformities .  No clubbing.       2+ lower extremity edema, No venous stasis  PULSE:   Appears equal and palpable.  PSYCHIATRIC:  Seems appropriate, No acute psychosis  MS:    No fractures, No gross weakness  NEUROLOGIC:   The clinical assessment is non-focal     DATA: IMAGING & TESTING:     LABORATORY TESTS:    CBC with Differential:    Lab Results   Component Value Date/Time    WBC 10.1 2025 05:10 AM    RBC 3.96 2025 05:10 AM    HGB 10.5 2025 05:10 AM    HCT 35.9 2025 05:10 AM     2025 03:10 AM    MCV 90.7 2025 05:10 AM    MCH 26.5 2025 05:10 AM    MCHC 29.2 2025 05:10 AM    RDW  Composition:  Solid (2) 2. Echogenicity:  Hypoechoic (2) 3. Shape:  Wider-than-tall (0) 4. Margins:  Ill-defined (0) 5. Echogenic foci:  None (0) ACR TI-RADS total points:  4 ACR TI-RADS risk category: TR 4 Prior biopsy: No NODULE: Left 1 Size: 43 x 37 x 32 mm Location: Left thyroid 1. Composition:  Solid (2) 2. Echogenicity:  Isoechoic (1) 3. Shape:  Wider-than-tall (0) 4. Margins:  Ill-defined (0) 5. Echogenic foci:  None (0) ACR TI-RADS total points:  3 ACR TI-RADS risk category: TR 3 Prior biopsy: No Soft tissues: No visualized lymphadenopathy     1.  Heterogeneous and hypervascular thyroid gland.  Please correlate with the patient's thyroid function test. 2.  Bilateral thyroid nodules.  The 43 mm left thyroid TR 3 nodule meets criteria for FNA.  19 mm TR 4 nodule meets criteria for FNA. ACR TI-RADS recommendations: TR5 (>= 7 points):  FNA if >= 1 cm; follow-up if 0.5-0.9 cm in 1, 2, 3, 4, and 5 years TR4 (4-6 points):  FNA if >= 1.5 cm; follow-up if 1.0-1.4 cm in 1, 2, 3, and 5 years TR3 (3 points):  FNA if >= 2.5 cm; follow-up if 1.5-2.4 cm in 1, 3, and 5 years TR2 (2 points):  No FNA or follow-up TR1 (0 points):  No FNA or follow-up ACR TI-RADS recommends that no more than two nodules with the highest ACR TI-RADS point total should be biopsied and no more than four nodules should be followed. RECOMMENDATIONS: Please correlate with patient's thyroid function test. Recommend FNA of the thyroid nodules described.  Left thyroid nodule seen on image 56 and right thyroid nodule seen on image 26.  If those pathologies are benign, follow-up thyroid ultrasound in 1 year would be recommended.     Echo (TTE) complete (PRN contrast/bubble/strain/3D)    Result Date: 2/6/2025    Left Ventricle: Normal left ventricular systolic function with a visually estimated EF of 65 - 70%. Left ventricle size is normal. Normal wall thickness. Ventricular mass is normal. Findings consistent with concentric remodeling. Normal wall

## 2025-02-09 NOTE — PROGRESS NOTES
Patient sitting on edge of bed, patient states that she prefers to sit up. Patient assisted by nurse to sit in a chair with a chair alarm.

## 2025-02-09 NOTE — PLAN OF CARE
Problem: Discharge Planning  Goal: Discharge to home or other facility with appropriate resources  Outcome: Progressing     Problem: Skin/Tissue Integrity  Goal: Skin integrity remains intact  Description: 1.  Monitor for areas of redness and/or skin breakdown  2.  Assess vascular access sites hourly  3.  Every 4-6 hours minimum:  Change oxygen saturation probe site  4.  Every 4-6 hours:  If on nasal continuous positive airway pressure, respiratory therapy assess nares and determine need for appliance change or resting period  Outcome: Progressing     Problem: Safety - Adult  Goal: Free from fall injury  Outcome: Progressing     Problem: ABCDS Injury Assessment  Goal: Absence of physical injury  Outcome: Progressing     Problem: Neurosensory - Adult  Goal: Achieves stable or improved neurological status  Outcome: Progressing  Goal: Achieves maximal functionality and self care  Outcome: Progressing     Problem: Respiratory - Adult  Goal: Achieves optimal ventilation and oxygenation  Outcome: Progressing     Problem: Cardiovascular - Adult  Goal: Maintains optimal cardiac output and hemodynamic stability  Outcome: Progressing  Goal: Absence of cardiac dysrhythmias or at baseline  Outcome: Progressing     Problem: Skin/Tissue Integrity - Adult  Goal: Skin integrity remains intact  Description: 1.  Monitor for areas of redness and/or skin breakdown  2.  Assess vascular access sites hourly  3.  Every 4-6 hours minimum:  Change oxygen saturation probe site  4.  Every 4-6 hours:  If on nasal continuous positive airway pressure, respiratory therapy assess nares and determine need for appliance change or resting period  Outcome: Progressing  Goal: Incisions, wounds, or drain sites healing without S/S of infection  Outcome: Progressing  Goal: Oral mucous membranes remain intact  Outcome: Progressing     Problem: Musculoskeletal - Adult  Goal: Return mobility to safest level of function  Outcome: Progressing  Goal: Return  ADL status to a safe level of function  Outcome: Progressing     Problem: Gastrointestinal - Adult  Goal: Maintains or returns to baseline bowel function  Outcome: Progressing  Goal: Maintains adequate nutritional intake  Outcome: Progressing     Problem: Genitourinary - Adult  Goal: Absence of urinary retention  Outcome: Progressing     Problem: Infection - Adult  Goal: Absence of infection at discharge  Outcome: Progressing  Goal: Absence of infection during hospitalization  Outcome: Progressing  Goal: Absence of fever/infection during anticipated neutropenic period  Outcome: Progressing     Problem: Metabolic/Fluid and Electrolytes - Adult  Goal: Electrolytes maintained within normal limits  Outcome: Progressing  Goal: Hemodynamic stability and optimal renal function maintained  Outcome: Progressing  Goal: Glucose maintained within prescribed range  Outcome: Progressing     Problem: Hematologic - Adult  Goal: Maintains hematologic stability  Outcome: Progressing     Problem: Pain  Goal: Verbalizes/displays adequate comfort level or baseline comfort level  Outcome: Progressing

## 2025-02-10 VITALS
WEIGHT: 231.9 LBS | RESPIRATION RATE: 19 BRPM | HEIGHT: 64 IN | SYSTOLIC BLOOD PRESSURE: 151 MMHG | BODY MASS INDEX: 39.59 KG/M2 | OXYGEN SATURATION: 95 % | TEMPERATURE: 98.4 F | DIASTOLIC BLOOD PRESSURE: 68 MMHG | HEART RATE: 80 BPM

## 2025-02-10 LAB
ALBUMIN SERPL-MCNC: 3.4 G/DL (ref 3.5–5.2)
ALP SERPL-CCNC: 110 U/L (ref 35–104)
ALT SERPL-CCNC: 22 U/L (ref 0–32)
ANION GAP SERPL CALCULATED.3IONS-SCNC: 10 MMOL/L (ref 7–16)
AST SERPL-CCNC: 24 U/L (ref 0–31)
BASOPHILS # BLD: 0 K/UL (ref 0–0.2)
BASOPHILS NFR BLD: 0 % (ref 0–2)
BILIRUB SERPL-MCNC: 0.3 MG/DL (ref 0–1.2)
BUN SERPL-MCNC: 35 MG/DL (ref 6–23)
CA-I BLD-SCNC: 1.18 MMOL/L (ref 1.15–1.33)
CALCIUM SERPL-MCNC: 8.3 MG/DL (ref 8.6–10.2)
CARDIOLIPIN IGA SER IA-ACNC: 0.7 APL (ref 0–14)
CARDIOLIPIN IGG SER IA-ACNC: <0.5 GPL (ref 0–10)
CARDIOLIPIN IGM SER IA-ACNC: 2.8 MPL (ref 0–10)
CHLORIDE SERPL-SCNC: 108 MMOL/L (ref 98–107)
CO2 SERPL-SCNC: 24 MMOL/L (ref 22–29)
CREAT SERPL-MCNC: 0.8 MG/DL (ref 0.5–1)
EOSINOPHIL # BLD: 0 K/UL (ref 0.05–0.5)
EOSINOPHILS RELATIVE PERCENT: 0 % (ref 0–6)
ERYTHROCYTE [DISTWIDTH] IN BLOOD BY AUTOMATED COUNT: 16.1 % (ref 11.5–15)
GFR, ESTIMATED: 74 ML/MIN/1.73M2
GLUCOSE SERPL-MCNC: 135 MG/DL (ref 74–99)
HCT VFR BLD AUTO: 36.5 % (ref 34–48)
HGB BLD-MCNC: 10.8 G/DL (ref 11.5–15.5)
LYMPHOCYTES NFR BLD: 0.77 K/UL (ref 1.5–4)
LYMPHOCYTES RELATIVE PERCENT: 11 % (ref 20–42)
MAGNESIUM SERPL-MCNC: 2.1 MG/DL (ref 1.6–2.6)
MCH RBC QN AUTO: 26 PG (ref 26–35)
MCHC RBC AUTO-ENTMCNC: 29.6 G/DL (ref 32–34.5)
MCV RBC AUTO: 88 FL (ref 80–99.9)
MONOCYTES NFR BLD: 0.47 K/UL (ref 0.1–0.95)
MONOCYTES NFR BLD: 7 % (ref 2–12)
NEUTROPHILS NFR BLD: 82 % (ref 43–80)
NEUTS SEG NFR BLD: 5.56 K/UL (ref 1.8–7.3)
PHOSPHATE SERPL-MCNC: 3.2 MG/DL (ref 2.5–4.5)
PLATELET # BLD AUTO: 173 K/UL (ref 130–450)
PMV BLD AUTO: 11.1 FL (ref 7–12)
POTASSIUM SERPL-SCNC: 3.8 MMOL/L (ref 3.5–5)
PROT SERPL-MCNC: 5.5 G/DL (ref 6.4–8.3)
RBC # BLD AUTO: 4.15 M/UL (ref 3.5–5.5)
RBC # BLD: ABNORMAL 10*6/UL
SODIUM SERPL-SCNC: 142 MMOL/L (ref 132–146)
WBC OTHER # BLD: 6.8 K/UL (ref 4.5–11.5)

## 2025-02-10 PROCEDURE — 94640 AIRWAY INHALATION TREATMENT: CPT

## 2025-02-10 PROCEDURE — 85025 COMPLETE CBC W/AUTO DIFF WBC: CPT

## 2025-02-10 PROCEDURE — 6370000000 HC RX 637 (ALT 250 FOR IP): Performed by: INTERNAL MEDICINE

## 2025-02-10 PROCEDURE — 99232 SBSQ HOSP IP/OBS MODERATE 35: CPT | Performed by: INTERNAL MEDICINE

## 2025-02-10 PROCEDURE — 80053 COMPREHEN METABOLIC PANEL: CPT

## 2025-02-10 PROCEDURE — 82330 ASSAY OF CALCIUM: CPT

## 2025-02-10 PROCEDURE — 6370000000 HC RX 637 (ALT 250 FOR IP): Performed by: PHYSICIAN ASSISTANT

## 2025-02-10 PROCEDURE — 99231 SBSQ HOSP IP/OBS SF/LOW 25: CPT | Performed by: INTERNAL MEDICINE

## 2025-02-10 PROCEDURE — 97161 PT EVAL LOW COMPLEX 20 MIN: CPT

## 2025-02-10 PROCEDURE — 36415 COLL VENOUS BLD VENIPUNCTURE: CPT

## 2025-02-10 PROCEDURE — 83735 ASSAY OF MAGNESIUM: CPT

## 2025-02-10 PROCEDURE — 84100 ASSAY OF PHOSPHORUS: CPT

## 2025-02-10 PROCEDURE — 97530 THERAPEUTIC ACTIVITIES: CPT

## 2025-02-10 RX ADMIN — CALCIUM CARBONATE (ANTACID) CHEW TAB 500 MG 500 MG: 500 CHEW TAB at 10:00

## 2025-02-10 RX ADMIN — APIXABAN 10 MG: 5 TABLET, FILM COATED ORAL at 10:00

## 2025-02-10 RX ADMIN — PETROLATUM: 420 OINTMENT TOPICAL at 10:35

## 2025-02-10 RX ADMIN — LISINOPRIL 20 MG: 20 TABLET ORAL at 10:00

## 2025-02-10 RX ADMIN — AMLODIPINE BESYLATE 5 MG: 5 TABLET ORAL at 10:00

## 2025-02-10 RX ADMIN — IPRATROPIUM BROMIDE AND ALBUTEROL SULFATE 1 DOSE: 2.5; .5 SOLUTION RESPIRATORY (INHALATION) at 09:05

## 2025-02-10 NOTE — PROGRESS NOTES
Pulse ox was 97% on room air at rest.  Patient stood up on room air.  Oxygen saturation was 94% on room air while ambulating.  Recovery pulse ox was 95% on room air.

## 2025-02-10 NOTE — PROGRESS NOTES
Physician Progress Note      PATIENT:               SHERWIN MARTIN  [facility] #:                  [id number]  :                       1950  ADMIT DATE:       2025 10:40 AM  DISCH DATE:        2/10/2025 3:40 PM  RESPONDING  PROVIDER #:        Toni Quiñones MD          QUERY TEXT:    Patient admitted with SOB, found to have acute PE. Also noted troponin 46 -   41. If possible, please document in the progress notes and discharge summary   if you are evaluating and/or treating any of the following:    The medical record reflects the following:  Risk Factors: acute pulmonary emboli, HTN, Obesity, HL  Clinical Indicators: trop 46 - 41 Per ED \"Today's ECG is unchanged from   previous tracings\"  Treatment: ECG, Troponin levels, critical care monitoring, NC O2, Heparin gtt  Options provided:  -- This patient has non-ischemic myocardial injury due to acute pulmonary   emboli.  -- Other - I will add my own diagnosis  -- Disagree - Not applicable / Not valid  -- Disagree - Clinically unable to determine / Unknown  -- Refer to Clinical Documentation Reviewer    PROVIDER RESPONSE TEXT:    This patient has non-ischemic myocardial injury due to acute pulmonary emboli.    Query created by: Minal Cancino on 2/10/2025 3:00 PM      Electronically signed by:  Toni Quiñones MD 2/10/2025 4:02 PM

## 2025-02-10 NOTE — PLAN OF CARE
Problem: Discharge Planning  Goal: Discharge to home or other facility with appropriate resources  Outcome: Progressing     Problem: Skin/Tissue Integrity  Goal: Skin integrity remains intact  Description: 1.  Monitor for areas of redness and/or skin breakdown  2.  Assess vascular access sites hourly  3.  Every 4-6 hours minimum:  Change oxygen saturation probe site  4.  Every 4-6 hours:  If on nasal continuous positive airway pressure, respiratory therapy assess nares and determine need for appliance change or resting period  Outcome: Progressing     Problem: Safety - Adult  Goal: Free from fall injury  Outcome: Progressing     Problem: ABCDS Injury Assessment  Goal: Absence of physical injury  Outcome: Progressing     Problem: Neurosensory - Adult  Goal: Achieves stable or improved neurological status  Outcome: Progressing  Goal: Achieves maximal functionality and self care  Outcome: Progressing     Problem: Respiratory - Adult  Goal: Achieves optimal ventilation and oxygenation  Outcome: Progressing     Problem: Cardiovascular - Adult  Goal: Maintains optimal cardiac output and hemodynamic stability  Outcome: Progressing  Goal: Absence of cardiac dysrhythmias or at baseline  Outcome: Progressing     Problem: Skin/Tissue Integrity - Adult  Goal: Skin integrity remains intact  Description: 1.  Monitor for areas of redness and/or skin breakdown  2.  Assess vascular access sites hourly  3.  Every 4-6 hours minimum:  Change oxygen saturation probe site  4.  Every 4-6 hours:  If on nasal continuous positive airway pressure, respiratory therapy assess nares and determine need for appliance change or resting period  Outcome: Progressing  Goal: Incisions, wounds, or drain sites healing without S/S of infection  Outcome: Progressing  Goal: Oral mucous membranes remain intact  Outcome: Progressing     Problem: Musculoskeletal - Adult  Goal: Return mobility to safest level of function  Outcome: Progressing  Goal: Return

## 2025-02-10 NOTE — PROGRESS NOTES
Physical Therapy  Physical Therapy Initial Assessment       Name: Chery Hayden  : 1950  MRN: [id number]      Date of Service: 2/10/2025    Evaluating PT:  Keya Vasquez PT, DPT [id number]    Room #:  8502/8502-B  Diagnosis:  Pulmonary embolism, bilateral (HCC) [I26.99]  Acute respiratory failure with hypoxia [J96.01]  Acute saddle pulmonary embolism without acute cor pulmonale (HCC) [I26.92]  PMHx/PSHx:   has a past medical history of Arthritis, Bleeding ulcer, and Hypertension.    Procedure/Surgery:  none this admission  Precautions: Falls,  alarms, TSM, monitor Spo2%     SUBJECTIVE:    Pt lives with her two sons in a 1 story home with ramp entry. Pt sleeps in a regular bed, has a lift chair, and ambulates with a rollator short distances. Pt's granddaughter lives ~ 10 minutes away. Pt does report someone is in the house .    Equipment Owned: rollator, lift chair, WC  Equipment Needs:  possible WW if DC home    OBJECTIVE:   Initial Evaluation  Date: 2/10/25 Treatment  Date:  Short Term/ Long Term   Goals   AM-PAC 6 Clicks      Was pt agreeable to Eval/treatment? Yes      Does pt have pain? No current pain  Reports global arthritic pain at times     Bed Mobility  Rolling: Beverly  Supine to sit: Beverly  Sit to supine: NT  Scooting: Beverly to EOB  Rolling: Independent  Supine to sit: Independent  Sit to supine: Independent  Scooting: Independent   Transfers Sit to stand: Beverly  Stand to sit: Beverly  Stand pivot: ModA with WW  Sit to stand: SBA  Stand to sit: SBA  Stand pivot: SBA  AAD   Ambulation    5 feet FW/BW with WW and ModA  150 feet with AAD SBA   Stair negotiation: ascended and descended  NT  NA - ramp entry.      ROM BUE:  Defer to OT  BLE:  WFL     Strength BUE:  Defer to OT  BLE:  4-/5  Improve 1 MMT   Balance Sitting EOB:  SBA  Dynamic Standing:  ModA WW  Sitting EOB:  Independent  Dynamic Standing:  SBA with AAD     Pt is A & O x 3-4 with increased time. Pt has reduced insight to safety and  treatment:    Therapeutic activities:   Bed Mobility: Verbal cues for proper positioning and sequencing to perform bed mobility to facilitate maximum independence.   Transfers: Verbal cues for proper positioning and sequencing to perform transfers safely with maximum independence.   Gait Training: Verbal cues for safety and pacing to maximize functional mobility independence.   Pt education for safety with all mobility, activity pacing, sequencing with gait and transfers.   Vitals and symptoms monitored during session.     Pt's/ family goals   1. Get better, return to PLOF    Prognosis is good for reaching above PT goals.    Patient and or family understand(s) diagnosis, prognosis, and plan of care.  Yes     PHYSICAL THERAPY PLAN OF CARE:    PT POC is established based on physician order and patient diagnosis     Referring provider/PT Order:    02/09/25 1015  PT eval and treat  Start:  02/09/25 1015,   End:  02/09/25 1015,   ONE TIME,   Standing Count:  1 Occurrences,   R         Geronimo Rushing MD     Diagnosis:  Pulmonary embolism, bilateral (HCC) [I26.99]  Acute respiratory failure with hypoxia [J96.01]  Acute saddle pulmonary embolism without acute cor pulmonale (HCC) [I26.92]  Specific instructions for next treatment:  progress functional mobility as tolerated.     Current Treatment Recommendations:     [x] Strengthening to improve independence with functional mobility   [x] ROM to improve independence with functional mobility   [x] Balance Training to improve static/dynamic balance and to reduce fall risk  [x] Endurance Training to improve activity tolerance during functional mobility   [x] Transfer Training to improve safety and independence with all functional transfers   [x] Gait Training to improve gait mechanics, endurance and assess need for appropriate assistive device  [] Stair Training in preparation for safe discharge home and/or into the community   [x] Positioning to prevent skin breakdown and

## 2025-02-10 NOTE — PROGRESS NOTES
[facility]  Department of Internal Medicine  Division of Pulmonary, Critical Care and Sleep Medicine  Progress Note    SUBJECTIVE:  Patient seen and examined.  She was seen on room air, in mild acute resp distress with transferring from wheelchair to bed.  Audible wheezes noted.  She denies chest pain, cough or wheezing.  +STACK.  Granddaughter present at bedside.      OBJECTIVE:     PHYSICAL EXAM:   VITALS:   Vitals:    02/09/25 2100 02/10/25 0045 02/10/25 0800 02/10/25 0905   BP: (!) 145/53 (!) 148/65 (!) 151/68    Pulse: 84 77 80    Resp: 20  19    Temp: 98.2 °F (36.8 °C)  98.4 °F (36.9 °C)    TempSrc: Oral      SpO2: 95% 91% 94% 95%   Weight:       Height:            Intake/Output Summary (Last 24 hours) at 2/10/2025 1226  Last data filed at 2/10/2025 0107  Gross per 24 hour   Intake --   Output 500 ml   Net -500 ml        CONSTITUTIONAL:    Obese, A&O x 3, NAD.  Patient is hirsute  SKIN:     No rash, No suspicious lesions, No skin discoloration  HEENT:     EOMI, MMM, No thrush  NECK:    No bruits, No JVP appreciated  CV:      Sinus,  No murmur, No rubs, No gallops  PULMONARY:   Diminished breath sounds due to body habitus with audible wheezes noted   ABDOMEN:     Soft, non-tender. BS normal. No R/R/G  EXT:    No deformities .  No clubbing.       2+ lower extremity edema, No venous stasis  PULSE:   Appears equal and palpable.  PSYCHIATRIC:  Seems appropriate, No acute psychosis  MS:    No fractures, No gross weakness  NEUROLOGIC:   The clinical assessment is non-focal     DATA: IMAGING & TESTING:     LABORATORY TESTS:    CBC with Differential:    Lab Results   Component Value Date/Time    WBC 6.8 02/10/2025 06:15 AM    RBC 4.15 02/10/2025 06:15 AM    HGB 10.8 02/10/2025 06:15 AM    HCT 36.5 02/10/2025 06:15 AM     02/10/2025 06:15 AM    MCV 88.0 02/10/2025 06:15 AM    MCH 26.0 02/10/2025 06:15 AM    MCHC 29.6 02/10/2025 06:15 AM    RDW 16.1  1000    apixaban (ELIQUIS) tablet 10 mg  10 mg Oral BID Juanpablo Hansen MD   10 mg at 02/10/25 1000    Followed by    [START ON 2/15/2025] apixaban (ELIQUIS) tablet 5 mg  5 mg Oral BID Juanpablo Hansen MD        lisinopril (PRINIVIL;ZESTRIL) tablet 20 mg  20 mg Oral Daily Juanpablo Hansen MD   20 mg at 02/10/25 1000    pantoprazole (PROTONIX) tablet 40 mg  40 mg Oral QAM AC Uriarte, Michelle, APRN - CNP   40 mg at 02/08/25 0600    ipratropium 0.5 mg-albuterol 2.5 mg (DUONEB) nebulizer solution 1 Dose  1 Dose Inhalation Q4H WA RT Lilly Jacques PA-C   1 Dose at 02/10/25 0905    sodium chloride flush 0.9 % injection 5-40 mL  5-40 mL IntraVENous 2 times per day Markel Kelly MD   10 mL at 02/09/25 2113    sodium chloride flush 0.9 % injection 5-40 mL  5-40 mL IntraVENous PRN Markel Kelly MD        0.9 % sodium chloride infusion   IntraVENous PRN Markel Kelly MD        potassium chloride (KLOR-CON M) extended release tablet 40 mEq  40 mEq Oral PRN Markel Kelly MD        Or    potassium bicarb-citric acid (EFFER-K) effervescent tablet 40 mEq  40 mEq Oral PRN Markel Kelly MD        Or    potassium chloride 10 mEq/100 mL IVPB (Peripheral Line)  10 mEq IntraVENous PRN Markel Kelly MD        magnesium sulfate 2000 mg in 50 mL IVPB premix  2,000 mg IntraVENous PRN Markel Kelly MD        ondansetron (ZOFRAN-ODT) disintegrating tablet 4 mg  4 mg Oral Q8H PRN Markel Kelly MD        Or    ondansetron (ZOFRAN) injection 4 mg  4 mg IntraVENous Q6H PRN Markel Kelly MD        polyethylene glycol (GLYCOLAX) packet 17 g  17 g Oral Daily PRN Markel Kelly MD        acetaminophen (TYLENOL) tablet 650 mg  650 mg Oral Q6H PRN Markel Kelly MD        Or    acetaminophen (TYLENOL) suppository 650 mg  650 mg Rectal Q6H PRN Markel Kelly MD        white petrolatum ointment   Topical BID Juanpablo Hansen MD   Given at 02/10/25 1035     Current Outpatient Medications   Medication Sig Dispense Refill    apixaban (ELIQUIS) 5 MG TABS

## 2025-02-10 NOTE — CARE COORDINATION
reached out to patients PCP office Dr Stan Leija at [phone] to schedule follow up D/C appointment.  spoke to office staff and scheduled an appointment on 2/18 at 11:30 AM in office.     Please bring your discharge paperwork, new medications, ID, insurance card and co-pay if you have one.    Information added to D/C summary.

## 2025-02-10 NOTE — PROGRESS NOTES
Notified Dr. Hansen, FNA of bilateral thyroid nodules needs to be order for outpatient per IR department.   none

## 2025-02-10 NOTE — CARE COORDINATION
Care Coordination  Noted discharge order. Plan remains home with his brothers.  [facility] [place] has accepted the patient and will see upon discharge. Pt Ot are still pending. The patient is currently on room air. Dietitian was also consulted to see the patient prior to discharge.Notified [facility] or the discharge order and home care orders are in the chart.    0 = independent

## 2025-02-10 NOTE — PROGRESS NOTES
CLINICAL PHARMACY NOTE: MEDS TO BEDS    Total # of Prescriptions Filled: 1   The following medications were delivered to the patient:  Eliquis 5mg    Additional Documentation:  Delivered to patient 2-10-25

## 2025-02-10 NOTE — CARE COORDINATION
Care Coordination  Noted discharge order. Plan remains home with his brothers.  [facility] has accepted the patient and will see upon discharge. Pt Ot are still pending. The patient is currently on room air. Dietitian was also consulted to see the patient prior to discharge.Notified [facility] or the discharge order and home care orders are in the chart.         1219-  Pt [facility] score is 13/24 and spoke to the patient and her family member at bedside to discuss her low therapy score. The plan remains to go home with [facility] care. They have a ramp to get into the home. A Rolator, wheelchair and a chair lift if needed. They are declining skilled care at this time. [facility] is aware of this and home care orders are in the chart.

## 2025-02-10 NOTE — DISCHARGE SUMMARY
Internal Medicine Discharge Summary    NAME: Chery Hayden :  1950  MRN:  [id number] PCP:Stan Leija DO    ADMITTED: 2025   DISCHARGED: 2/10/2025  3:40 PM    ADMITTING PHYSICIAN: Rosa att. providers found    PCP: Stan Leija DO    CONSULTANT(S):   IP CONSULT TO CRITICAL CARE  IP CONSULT TO INTERNAL MEDICINE  IP CONSULT TO ENDOCRINOLOGY  IP CONSULT TO PULMONOLOGY  IP CONSULT TO VASCULAR SURGERY  IP CONSULT TO HEM/ONC  IP CONSULT TO HOME CARE NEEDS  IP CONSULT TO DIETITIAN     ADMITTING DIAGNOSIS:   Pulmonary embolism, bilateral (HCC) [I26.99]  Acute respiratory failure with hypoxia [J96.01]  Acute saddle pulmonary embolism without acute cor pulmonale (HCC) [I26.92]     Please see H&P for further details    DISCHARGE DIAGNOSES:   Active Hospital Problems    Diagnosis     Multinodular goiter (nontoxic) [E04.2]     Primary hypertension [I10]     Pulmonary embolism, bilateral (HCC) [I26.99]        BRIEF HISTORY OF PRESENT ILLNESS: Chery Hayden is a 74 y.o. female patient of Stan Leija DO who  has a past medical history of Arthritis, Bleeding ulcer, and Hypertension. who originally had concerns including Cold Symptoms (Patient c/o URI x 2 weeks. Still wheezing. Denies feeling short of breath. Per family O2 sat low. Arrives on 2L NC. No O2 at baseline. ). at presentation on 2025, and was found to have Pulmonary embolism, bilateral (HCC) [I26.99]  Acute respiratory failure with hypoxia [J96.01]  Acute saddle pulmonary embolism without acute cor pulmonale (HCC) [I26.92] after workup.    Please see H&P for further details.    HOSPITAL COURSE:   The patient presented to the hospital with the chief complaint of Cold Symptoms (Patient c/o URI x 2 weeks. Still wheezing. Denies feeling short of breath. Per family O2 sat low. Arrives on 2L NC. No O2 at baseline. )  . The patient was admitted to the hospital.     CTA Chest: Study is positive for pulmonary embolic disease with a saddle embolus in the  these medications    Details   apixaban (ELIQUIS) 5 MG TABS tablet Take 2 tablets by mouth 2 times daily for 5 days, THEN 1 tablet 2 times daily., Disp-60 tablet, R-3Normal             INTERNAL MEDICINE FOLLOW UP/INSTRUCTIONS:  Follow-up with primary care physician within 1 week of discharge from hospital  Please review changes to pre-hospital admission medications and prescriptions for new medications upon discharge from the hospital with PCP  Please review results of labs and imaging studies with PCP  Follow-up with consultants as directed by them   If recurrence or worsening of symptoms please call primary care physician or return to the ER immediately  Diet: ADULT DIET; Regular    Preparing for this patient's discharge, including paperwork, orders, instructions, and meeting with patient did required >35 minutes.    Electronically signed by Toni Quiñones MD on 2/10/2025 at 4:02 PM

## 2025-02-10 NOTE — PROGRESS NOTES
ENDOCRINOLOGY  PROGRESS NOTE    Date of Admission: 2/6/2025  Date of Service: 2/10/2025  Admitting Physician: Markel Kelly MD   Primary Care Physician: Stan Leija DO  Consultant physician: David Hill MD     Reason for the consultation:  Multinodular goiter    History of Present Illness:  The history is provided by the patient. Accuracy of the patient data is excellent.    Chery Hayden is a very pleasant 74 y.o. old female with PMH of obesity, hypertension and other listed below admitted to [facility] on 2/6/2025 because of shortness of breath and found to have PE, endocrine service was consulted for evaluation and management of multinodular goiter     Subjective   The patient was seen today, no acute events overnight.      Allergies and Drug reactions  No Known Allergies    Scheduled Meds:   amLODIPine  5 mg Oral BID    calcium carbonate  500 mg Oral TID    apixaban  10 mg Oral BID    Followed by    [START ON 2/15/2025] apixaban  5 mg Oral BID    lisinopril  20 mg Oral Daily    pantoprazole  40 mg Oral QAM AC    ipratropium 0.5 mg-albuterol 2.5 mg  1 Dose Inhalation Q4H WA RT    sodium chloride flush  5-40 mL IntraVENous 2 times per day    white petrolatum   Topical BID     PRN Meds:   hydrALAZINE, 10 mg, Q6H PRN  sodium chloride flush, 5-40 mL, PRN  sodium chloride, , PRN  potassium chloride, 40 mEq, PRN   Or  potassium alternative oral replacement, 40 mEq, PRN   Or  potassium chloride, 10 mEq, PRN  magnesium sulfate, 2,000 mg, PRN  ondansetron, 4 mg, Q8H PRN   Or  ondansetron, 4 mg, Q6H PRN  polyethylene glycol, 17 g, Daily PRN  acetaminophen, 650 mg, Q6H PRN   Or  acetaminophen, 650 mg, Q6H PRN      Continuous Infusions:   sodium chloride         Review of Systems  All systems reviewed. All negative except for symptoms mentioned in HPI     OBJECTIVE    BP (!) 151/68   Pulse 80   Temp 98.4 °F (36.9 °C)   Resp 19   Ht 1.626 m (5' 4\")   Wt 105.2 kg (231 lb 14.4 oz)   SpO2 95%   BMI 39.81 kg/m²   Wt

## 2025-02-13 LAB
F2 C.20210G>A GENO BLD/T: NEGATIVE
F5 P.R506Q BLD/T QL: NEGATIVE
MTHFR INTERPRETATION: NORMAL
MTHFR MUTATION A1286C: NEGATIVE
MTHFR MUTATION C665T: NORMAL
SPECIMEN SOURCE: NORMAL
SPECIMEN SOURCE: NORMAL
SPECIMEN: NORMAL

## 2025-02-14 LAB
PAI-1 INTERPRETATION: ABNORMAL
PLASMINOGEN ACT. INHIBITOR-1 (PAI-1) SPECIMEN: ABNORMAL

## 2025-03-10 NOTE — PROGRESS NOTES
saturations 88% on room air as   noted on admission.    Query created by: Minal Cancino on 3/10/2025 11:55 AM      Electronically signed by:  Toni Quiñones MD 3/10/2025 2:46 PM

## 2025-03-12 ENCOUNTER — PROCEDURE VISIT (OUTPATIENT)
Dept: AUDIOLOGY | Age: 75
End: 2025-03-12
Payer: MEDICARE

## 2025-03-12 ENCOUNTER — OFFICE VISIT (OUTPATIENT)
Dept: ENT CLINIC | Age: 75
End: 2025-03-12
Payer: MEDICARE

## 2025-03-12 ENCOUNTER — TELEPHONE (OUTPATIENT)
Dept: ENT CLINIC | Age: 75
End: 2025-03-12

## 2025-03-12 VITALS
HEART RATE: 71 BPM | TEMPERATURE: 97.3 F | OXYGEN SATURATION: 96 % | DIASTOLIC BLOOD PRESSURE: 75 MMHG | HEIGHT: 64 IN | SYSTOLIC BLOOD PRESSURE: 155 MMHG | BODY MASS INDEX: 39.27 KG/M2 | WEIGHT: 230 LBS | RESPIRATION RATE: 21 BRPM

## 2025-03-12 DIAGNOSIS — H91.8X3 ASYMMETRICAL HEARING LOSS: ICD-10-CM

## 2025-03-12 DIAGNOSIS — Z01.812 PRE-PROCEDURE LAB EXAM: ICD-10-CM

## 2025-03-12 DIAGNOSIS — H90.3 ASYMMETRICAL SENSORINEURAL HEARING LOSS: Primary | ICD-10-CM

## 2025-03-12 DIAGNOSIS — H61.21 RIGHT EAR IMPACTED CERUMEN: ICD-10-CM

## 2025-03-12 DIAGNOSIS — H90.3 BILATERAL SENSORINEURAL HEARING LOSS: ICD-10-CM

## 2025-03-12 DIAGNOSIS — H90.3 SENSORINEURAL HEARING LOSS (SNHL) OF BOTH EARS: Primary | ICD-10-CM

## 2025-03-12 PROCEDURE — 1090F PRES/ABSN URINE INCON ASSESS: CPT | Performed by: NURSE PRACTITIONER

## 2025-03-12 PROCEDURE — 1160F RVW MEDS BY RX/DR IN RCRD: CPT | Performed by: NURSE PRACTITIONER

## 2025-03-12 PROCEDURE — 1123F ACP DISCUSS/DSCN MKR DOCD: CPT | Performed by: NURSE PRACTITIONER

## 2025-03-12 PROCEDURE — 92567 TYMPANOMETRY: CPT | Performed by: AUDIOLOGIST

## 2025-03-12 PROCEDURE — G8427 DOCREV CUR MEDS BY ELIG CLIN: HCPCS | Performed by: NURSE PRACTITIONER

## 2025-03-12 PROCEDURE — 1159F MED LIST DOCD IN RCRD: CPT | Performed by: NURSE PRACTITIONER

## 2025-03-12 PROCEDURE — 3078F DIAST BP <80 MM HG: CPT | Performed by: NURSE PRACTITIONER

## 2025-03-12 PROCEDURE — 99203 OFFICE O/P NEW LOW 30 MIN: CPT | Performed by: NURSE PRACTITIONER

## 2025-03-12 PROCEDURE — 92557 COMPREHENSIVE HEARING TEST: CPT | Performed by: AUDIOLOGIST

## 2025-03-12 PROCEDURE — G8417 CALC BMI ABV UP PARAM F/U: HCPCS | Performed by: NURSE PRACTITIONER

## 2025-03-12 PROCEDURE — G8400 PT W/DXA NO RESULTS DOC: HCPCS | Performed by: NURSE PRACTITIONER

## 2025-03-12 PROCEDURE — 1036F TOBACCO NON-USER: CPT | Performed by: NURSE PRACTITIONER

## 2025-03-12 PROCEDURE — 1111F DSCHRG MED/CURRENT MED MERGE: CPT | Performed by: NURSE PRACTITIONER

## 2025-03-12 PROCEDURE — 69210 REMOVE IMPACTED EAR WAX UNI: CPT | Performed by: NURSE PRACTITIONER

## 2025-03-12 PROCEDURE — 3077F SYST BP >= 140 MM HG: CPT | Performed by: NURSE PRACTITIONER

## 2025-03-12 ASSESSMENT — ENCOUNTER SYMPTOMS
EYES NEGATIVE: 1
SINUS PRESSURE: 0
RESPIRATORY NEGATIVE: 1
SHORTNESS OF BREATH: 0
SINUS PAIN: 0
RHINORRHEA: 0
STRIDOR: 0

## 2025-03-12 NOTE — TELEPHONE ENCOUNTER
[facility] to authorize order with patient insurance. Patient is scheduled for MRI IAC with radiology on 4/8/25 @ 9:00am. Patient has been notified of date and time and that they need to arrive at 8:30am. Patient was informed of her prep prior to procedure. Patient instructed to park in [facility] parking lot and report to registration. Patient's granddaughter verbalized understanding but will be changing appt due to having to work. Phone number for radiology scheduling given to Helen.    Electronically signed by Ghazala Rick MA on 3/12/25 at 10:13 AM EDT

## 2025-03-12 NOTE — PROGRESS NOTES
This patient was referred for audiometric/tympanometric testing by GILMA Owusu due to hearing loss.      Audiometry using pure tone air and bone conduction revealed a moderate severe to severe sensorineural hearing loss in the left ear. Right ear revealed severe to profound sensorineural hearing loss. Asymmetric hearing was noted and discussed with provider.   Reliability was good. Speech reception thresholds were in good agreement with the pure tone averages, bilaterally. Speech discrimination scores were poor, bilaterally.    Tympanometry revealed normal middle ear peak pressure and compliance, bilaterally.        The results were reviewed with the patient and CNP.     Recommendations for follow up will be made pending ordering provider consult.    Ruchi Osman/CCC-A  [place] Lic # [id number]

## 2025-03-12 NOTE — PROGRESS NOTES
Dr. Jeronimo England  Danna Saad CNP    Patient Name:  Chery Hayden  :  1950     CHIEF C/O:    Chief Complaint   Patient presents with    New Patient     Hearing loss        HISTORY OBTAINED FROM:  patient and granddaughter    HISTORY OF PRESENT ILLNESS:       Chery is a 74 y.o. year old female, here today for hearing evaluation. She has had hearing loss for at least 5 years. She feels like her left ear is good, however the right ear is bad. She wears hearing aids. She gets a lot of wax build up. No h/o ear infections, drainage, or pain. Hearing loss does not run in her family. No h/o loud noise exposure. No sinus/allergy issues. She got her last set of hearing aids from [facility], and she does not feel like they are helping her much. She is interested in looking into getting new hearing aids. She has not checked with her insurance in regards to hearing aid coverage. She has never had any imaging done on the head to work up her asymmetrical SNHL.       Past Medical History:   Diagnosis Date    Arthritis     Bleeding ulcer     Hypertension      Past Surgical History:   Procedure Laterality Date     SECTION      CHOLECYSTECTOMY, LAPAROSCOPIC  2016    ERCP  2016    HYSTERECTOMY (CERVIX STATUS UNKNOWN)      JOINT REPLACEMENT Left     left knee       Current Outpatient Medications:     apixaban (ELIQUIS) 5 MG TABS tablet, Take 2 tablets by mouth 2 times daily for 5 days, THEN 1 tablet 2 times daily., Disp: 60 tablet, Rfl: 3    amLODIPine (NORVASC) 5 MG tablet, Take 1 tablet by mouth daily, Disp: , Rfl:     allopurinol (ZYLOPRIM) 300 MG tablet, Take by mouth, Disp: , Rfl:     furosemide (LASIX) 40 MG tablet, Take 1 tablet by mouth daily Indications: taking as needed, Disp: , Rfl:     potassium chloride (KLOR-CON) 20 MEQ packet, Take 20 mEq by mouth daily Indications: taking as needed, Disp: , Rfl:     lisinopril-hydroCHLOROthiazide

## 2025-03-13 ENCOUNTER — OFFICE VISIT (OUTPATIENT)
Dept: PULMONOLOGY | Age: 75
End: 2025-03-13
Payer: MEDICARE

## 2025-03-13 VITALS
TEMPERATURE: 97 F | DIASTOLIC BLOOD PRESSURE: 85 MMHG | OXYGEN SATURATION: 100 % | RESPIRATION RATE: 12 BRPM | HEART RATE: 68 BPM | SYSTOLIC BLOOD PRESSURE: 134 MMHG

## 2025-03-13 DIAGNOSIS — G47.19 EXCESSIVE DAYTIME SLEEPINESS: ICD-10-CM

## 2025-03-13 DIAGNOSIS — R06.02 SHORTNESS OF BREATH: ICD-10-CM

## 2025-03-13 DIAGNOSIS — I26.09 OTHER ACUTE PULMONARY EMBOLISM WITH ACUTE COR PULMONALE (HCC): Primary | ICD-10-CM

## 2025-03-13 PROCEDURE — G8400 PT W/DXA NO RESULTS DOC: HCPCS | Performed by: INTERNAL MEDICINE

## 2025-03-13 PROCEDURE — 99214 OFFICE O/P EST MOD 30 MIN: CPT | Performed by: INTERNAL MEDICINE

## 2025-03-13 PROCEDURE — G8427 DOCREV CUR MEDS BY ELIG CLIN: HCPCS | Performed by: INTERNAL MEDICINE

## 2025-03-13 PROCEDURE — G8417 CALC BMI ABV UP PARAM F/U: HCPCS | Performed by: INTERNAL MEDICINE

## 2025-03-13 PROCEDURE — 1036F TOBACCO NON-USER: CPT | Performed by: INTERNAL MEDICINE

## 2025-03-13 PROCEDURE — 1123F ACP DISCUSS/DSCN MKR DOCD: CPT | Performed by: INTERNAL MEDICINE

## 2025-03-13 PROCEDURE — 3079F DIAST BP 80-89 MM HG: CPT | Performed by: INTERNAL MEDICINE

## 2025-03-13 PROCEDURE — 1159F MED LIST DOCD IN RCRD: CPT | Performed by: INTERNAL MEDICINE

## 2025-03-13 PROCEDURE — 3075F SYST BP GE 130 - 139MM HG: CPT | Performed by: INTERNAL MEDICINE

## 2025-03-13 PROCEDURE — 1090F PRES/ABSN URINE INCON ASSESS: CPT | Performed by: INTERNAL MEDICINE

## 2025-03-13 RX ORDER — OXYCODONE AND ACETAMINOPHEN 5; 325 MG/1; MG/1
TABLET ORAL
COMMUNITY
Start: 2025-02-10

## 2025-03-13 NOTE — PROGRESS NOTES
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 female with past medical history noted for PE, morbid obesity that presented to hospital for follow up.    Chery Hayden 74 y.o. female was seen in consultation regarding the above chief compliant.  Patient seen and examined.  For me she tells me that she came to the emergency room because her granddaughter \"did not like the way she looked \"she currently denies any symptoms for me of shortness of breath or coughing.  She denies any periods of immobilization.  She denies any recent surgeries.  She denies any former history of blood clots denies any familial history of blood clots she denies any personal history of cancer.     =================================================================    - Office Visit 3/13/2025:  Patient seen and examined.  Patient remains with no increased work of breathing, fevers, chills noted.      HOME MEDICATIONS:  Prior to Admission medications    Medication Sig Start Date End Date Taking? Authorizing Provider   oxyCODONE-acetaminophen (PERCOCET) 5-325 MG per tablet TAKE ONE TABLET BY MOUTH EVERY 12 HOURS AS NEEDED FOR PAIN

## 2025-03-17 ENCOUNTER — TELEPHONE (OUTPATIENT)
Dept: CARDIOLOGY | Age: 75
End: 2025-03-17

## 2025-03-17 NOTE — TELEPHONE ENCOUNTER
Called pt and spoke to daughter about scheduling echo. Pt daughter said pt will call back to schedule.     Electronically signed by Claudette De León on 3/17/2025 at 12:50 PM

## 2025-03-18 ENCOUNTER — TELEPHONE (OUTPATIENT)
Dept: PULMONOLOGY | Age: 75
End: 2025-03-18

## 2025-03-18 NOTE — TELEPHONE ENCOUNTER
Mailed a letter to patient informing her that her PFT is scheduled for 4-11-25 at 2:00 pm at the [facility]. She must arrive by 1:30 pm. She is to have no caffeine for 24 hours prior to test and no resp meds for at least 4 hours prior to test    Her Lung Vent is scheduled for 4-11-25 at 3:00 pm directly after her PFT. There is no prep for this test

## 2025-03-19 ENCOUNTER — TRANSCRIBE ORDERS (OUTPATIENT)
Dept: SLEEP CENTER | Age: 75
End: 2025-03-19

## 2025-03-19 DIAGNOSIS — G47.19 DAYTIME HYPERSOMNOLENCE: Primary | ICD-10-CM

## 2025-03-20 DIAGNOSIS — I26.09 OTHER ACUTE PULMONARY EMBOLISM WITH ACUTE COR PULMONALE: Primary | ICD-10-CM

## 2025-03-28 ENCOUNTER — HOSPITAL ENCOUNTER (OUTPATIENT)
Age: 75
Discharge: HOME | End: 2025-03-28
Payer: MEDICARE

## 2025-03-28 DIAGNOSIS — Z01.812 PRE-PROCEDURE LAB EXAM: ICD-10-CM

## 2025-03-28 LAB
BUN SERPL-MCNC: 23 MG/DL (ref 6–23)
CREAT SERPL-MCNC: 0.9 MG/DL (ref 0.5–1)
GFR, ESTIMATED: 70 ML/MIN/1.73M2

## 2025-03-28 PROCEDURE — 36415 COLL VENOUS BLD VENIPUNCTURE: CPT

## 2025-03-28 PROCEDURE — 82565 ASSAY OF CREATININE: CPT

## 2025-03-28 PROCEDURE — 84520 ASSAY OF UREA NITROGEN: CPT

## 2025-03-31 ENCOUNTER — OFFICE VISIT (OUTPATIENT)
Dept: ENDOCRINOLOGY | Age: 75
End: 2025-03-31
Payer: MEDICARE

## 2025-03-31 VITALS
RESPIRATION RATE: 18 BRPM | HEIGHT: 60 IN | DIASTOLIC BLOOD PRESSURE: 62 MMHG | HEART RATE: 84 BPM | TEMPERATURE: 97.6 F | SYSTOLIC BLOOD PRESSURE: 151 MMHG | OXYGEN SATURATION: 96 % | WEIGHT: 230 LBS | BODY MASS INDEX: 45.16 KG/M2

## 2025-03-31 DIAGNOSIS — E04.2 MULTINODULAR GOITER: Primary | ICD-10-CM

## 2025-03-31 DIAGNOSIS — E55.9 VITAMIN D DEFICIENCY: ICD-10-CM

## 2025-03-31 PROCEDURE — 1123F ACP DISCUSS/DSCN MKR DOCD: CPT | Performed by: INTERNAL MEDICINE

## 2025-03-31 PROCEDURE — G8400 PT W/DXA NO RESULTS DOC: HCPCS | Performed by: INTERNAL MEDICINE

## 2025-03-31 PROCEDURE — 1159F MED LIST DOCD IN RCRD: CPT | Performed by: INTERNAL MEDICINE

## 2025-03-31 PROCEDURE — 99214 OFFICE O/P EST MOD 30 MIN: CPT | Performed by: INTERNAL MEDICINE

## 2025-03-31 PROCEDURE — 3078F DIAST BP <80 MM HG: CPT | Performed by: INTERNAL MEDICINE

## 2025-03-31 PROCEDURE — 1036F TOBACCO NON-USER: CPT | Performed by: INTERNAL MEDICINE

## 2025-03-31 PROCEDURE — 1090F PRES/ABSN URINE INCON ASSESS: CPT | Performed by: INTERNAL MEDICINE

## 2025-03-31 PROCEDURE — G8427 DOCREV CUR MEDS BY ELIG CLIN: HCPCS | Performed by: INTERNAL MEDICINE

## 2025-03-31 PROCEDURE — G8417 CALC BMI ABV UP PARAM F/U: HCPCS | Performed by: INTERNAL MEDICINE

## 2025-03-31 PROCEDURE — 3077F SYST BP >= 140 MM HG: CPT | Performed by: INTERNAL MEDICINE

## 2025-03-31 PROCEDURE — G2211 COMPLEX E/M VISIT ADD ON: HCPCS | Performed by: INTERNAL MEDICINE

## 2025-03-31 NOTE — PROGRESS NOTES
[facility]  [address], [address]  [facility] [address]  Dept: [phone]  Loc: [phone]        Date of Service: 3/31/2025  Primary Care Physician: Stan Leija DO  Provider: David Hill MD     Reason for the visit:  Multinodular goiter    History of Present Illness:  The history is provided by the patient. Accuracy of the patient data is excellent.    Chery Hayden is a very pleasant 75 y.o. old female with PMH of obesity, hypertension and other listed below seen today for management of multinodular goiter  The patient was admitted to the hospital in early 2025 because of shortness of breath.      CTA study was positive for pulmonary embolic disease with a saddle embolus in the distal main right pulmonary artery extending into the right upper and the lower lobe as well as multiple right lower lobe segmental emboli.  The CT scan also incidentally showed large multinodular goiter.    A dedicated thyroid ultrasound on 2025 showed right thyroid lobe measuring 4.4 x 1.4 x 2.2 cm, left thyroid lobe measuring 5.3 x 3.2 x 3.4 cm, isthmus measuring 0.7 cm.  In the right thyroid lobe there is 1.9 cm solid hypoechoic thyroid nodule.  In the left thyroid lobe there is 4.3 cm solid isoechoic thyroid nodule.    Lab Results   Component Value Date/Time    TSH 1.09 2025 11:20 AM    T4FREE 1.3 2025 03:24 PM       Past Medical History   Past Medical History:   Diagnosis Date    Arthritis     Bleeding ulcer     Hypertension        Past Surgical History   Past Surgical History:   Procedure Laterality Date     SECTION      CHOLECYSTECTOMY, LAPAROSCOPIC  2016    ERCP  2016    HYSTERECTOMY (CERVIX STATUS UNKNOWN)      JOINT REPLACEMENT Left     left knee       Social history   Tobacco:   reports that she has never smoked. She has never used smokeless tobacco.  Alcohol:   reports no history of alcohol use.  Drugs:

## 2025-04-09 ENCOUNTER — HOSPITAL ENCOUNTER (OUTPATIENT)
Dept: MRI IMAGING | Age: 75
Discharge: HOME | End: 2025-04-11
Payer: MEDICARE

## 2025-04-09 DIAGNOSIS — H90.3 ASYMMETRICAL SENSORINEURAL HEARING LOSS: ICD-10-CM

## 2025-04-09 PROCEDURE — 6360000004 HC RX CONTRAST MEDICATION: Performed by: RADIOLOGY

## 2025-04-09 PROCEDURE — A9579 GAD-BASE MR CONTRAST NOS,1ML: HCPCS | Performed by: RADIOLOGY

## 2025-04-09 PROCEDURE — 70553 MRI BRAIN STEM W/O & W/DYE: CPT

## 2025-04-09 RX ADMIN — GADOTERIDOL 20 ML: 279.3 INJECTION, SOLUTION INTRAVENOUS at 08:29

## 2025-04-10 ENCOUNTER — HOSPITAL ENCOUNTER (OUTPATIENT)
Dept: SLEEP CENTER | Age: 75
Discharge: HOME | End: 2025-04-10
Attending: INTERNAL MEDICINE
Payer: MEDICARE

## 2025-04-10 DIAGNOSIS — G47.19 DAYTIME HYPERSOMNOLENCE: ICD-10-CM

## 2025-04-10 PROCEDURE — 95800 SLP STDY UNATTENDED: CPT

## 2025-04-23 ENCOUNTER — HOSPITAL ENCOUNTER (OUTPATIENT)
Age: 75
Discharge: HOME | End: 2025-04-25
Payer: MEDICARE

## 2025-04-23 ENCOUNTER — OFFICE VISIT (OUTPATIENT)
Dept: ENT CLINIC | Age: 75
End: 2025-04-23
Payer: MEDICARE

## 2025-04-23 ENCOUNTER — TELEPHONE (OUTPATIENT)
Dept: ENT CLINIC | Age: 75
End: 2025-04-23

## 2025-04-23 ENCOUNTER — HOSPITAL ENCOUNTER (OUTPATIENT)
Dept: GENERAL RADIOLOGY | Age: 75
Discharge: HOME | End: 2025-04-25
Payer: MEDICARE

## 2025-04-23 VITALS
BODY MASS INDEX: 44.92 KG/M2 | HEIGHT: 60 IN | RESPIRATION RATE: 18 BRPM | OXYGEN SATURATION: 95 % | TEMPERATURE: 97.9 F | SYSTOLIC BLOOD PRESSURE: 164 MMHG | HEART RATE: 76 BPM | DIASTOLIC BLOOD PRESSURE: 76 MMHG

## 2025-04-23 DIAGNOSIS — D33.3 ACOUSTIC NEUROMA (HCC): Primary | ICD-10-CM

## 2025-04-23 DIAGNOSIS — H90.3 BILATERAL SENSORINEURAL HEARING LOSS: ICD-10-CM

## 2025-04-23 DIAGNOSIS — H90.3 ASYMMETRICAL SENSORINEURAL HEARING LOSS: ICD-10-CM

## 2025-04-23 DIAGNOSIS — I26.09 OTHER ACUTE PULMONARY EMBOLISM WITH ACUTE COR PULMONALE (HCC): ICD-10-CM

## 2025-04-23 PROCEDURE — 1160F RVW MEDS BY RX/DR IN RCRD: CPT | Performed by: NURSE PRACTITIONER

## 2025-04-23 PROCEDURE — 1036F TOBACCO NON-USER: CPT | Performed by: NURSE PRACTITIONER

## 2025-04-23 PROCEDURE — 3078F DIAST BP <80 MM HG: CPT | Performed by: NURSE PRACTITIONER

## 2025-04-23 PROCEDURE — 71046 X-RAY EXAM CHEST 2 VIEWS: CPT

## 2025-04-23 PROCEDURE — G8427 DOCREV CUR MEDS BY ELIG CLIN: HCPCS | Performed by: NURSE PRACTITIONER

## 2025-04-23 PROCEDURE — 3077F SYST BP >= 140 MM HG: CPT | Performed by: NURSE PRACTITIONER

## 2025-04-23 PROCEDURE — G8417 CALC BMI ABV UP PARAM F/U: HCPCS | Performed by: NURSE PRACTITIONER

## 2025-04-23 PROCEDURE — 99213 OFFICE O/P EST LOW 20 MIN: CPT | Performed by: NURSE PRACTITIONER

## 2025-04-23 PROCEDURE — 1090F PRES/ABSN URINE INCON ASSESS: CPT | Performed by: NURSE PRACTITIONER

## 2025-04-23 PROCEDURE — 1123F ACP DISCUSS/DSCN MKR DOCD: CPT | Performed by: NURSE PRACTITIONER

## 2025-04-23 PROCEDURE — G8400 PT W/DXA NO RESULTS DOC: HCPCS | Performed by: NURSE PRACTITIONER

## 2025-04-23 PROCEDURE — 1159F MED LIST DOCD IN RCRD: CPT | Performed by: NURSE PRACTITIONER

## 2025-04-23 ASSESSMENT — ENCOUNTER SYMPTOMS
SINUS PAIN: 0
RESPIRATORY NEGATIVE: 1
SHORTNESS OF BREATH: 0
RHINORRHEA: 0
STRIDOR: 0
EYES NEGATIVE: 1
SINUS PRESSURE: 0

## 2025-04-23 NOTE — TELEPHONE ENCOUNTER
Called Dr Leija's office regarding high BP. Notified Patrick of readings. Patient did not take her BP meds today yet.

## 2025-04-23 NOTE — PROGRESS NOTES
Negative for dizziness.   Hematological: Negative.    Psychiatric/Behavioral: Negative.         BP (!) 177/67 (BP Site: Left Lower Arm, Patient Position: Sitting, BP Cuff Size: Small Adult)   Pulse 76   Temp 97.9 °F (36.6 °C) (Temporal)   Resp 18   Ht 1.524 m (5')   SpO2 95%   BMI 44.92 kg/m²   Physical Exam  Constitutional:       Appearance: Normal appearance.   HENT:      Head: Normocephalic.      Right Ear: Tympanic membrane and ear canal normal.      Left Ear: Tympanic membrane and ear canal normal.      Ears:      Comments: Bilateral hearing aids in place     Nose: Nose normal. No congestion or rhinorrhea.      Mouth/Throat:      Lips: Pink.      Mouth: Mucous membranes are moist.      Pharynx: Oropharynx is clear.      Comments: edentulous  Eyes:      Conjunctiva/sclera: Conjunctivae normal.      Pupils: Pupils are equal, round, and reactive to light.   Cardiovascular:      Rate and Rhythm: Normal rate and regular rhythm.      Pulses: Normal pulses.   Pulmonary:      Effort: Pulmonary effort is normal. No respiratory distress.      Breath sounds: No stridor.   Musculoskeletal:         General: Normal range of motion.      Cervical back: Normal range of motion. No rigidity. No muscular tenderness.   Lymphadenopathy:      Cervical: No cervical adenopathy.   Skin:     General: Skin is warm and dry.   Neurological:      General: No focal deficit present.      Mental Status: She is alert and oriented to person, place, and time.   Psychiatric:         Mood and Affect: Mood normal.         Behavior: Behavior normal.         Thought Content: Thought content normal.         Judgment: Judgment normal.         IMPRESSION/PLAN:  1. Acoustic neuroma (HCC)  -     Amb External Referral To ENT  2. Asymmetrical sensorineural hearing loss  3. Bilateral sensorineural hearing loss    Recommend evaluation with neuro otology at  for findings of acoustic neuroma.  The patient was also given a copy of her hearing test from

## 2025-04-24 ENCOUNTER — HOSPITAL ENCOUNTER (OUTPATIENT)
Dept: NUCLEAR MEDICINE | Age: 75
Discharge: HOME | End: 2025-04-26
Attending: INTERNAL MEDICINE
Payer: MEDICARE

## 2025-04-24 ENCOUNTER — HOSPITAL ENCOUNTER (OUTPATIENT)
Dept: PULMONOLOGY | Age: 75
Discharge: HOME | End: 2025-04-24
Attending: INTERNAL MEDICINE

## 2025-04-24 DIAGNOSIS — I26.09 OTHER ACUTE PULMONARY EMBOLISM WITH ACUTE COR PULMONALE (HCC): ICD-10-CM

## 2025-04-24 PROCEDURE — 78582 LUNG VENTILAT&PERFUS IMAGING: CPT

## 2025-04-24 PROCEDURE — 3430000000 HC RX DIAGNOSTIC RADIOPHARMACEUTICAL: Performed by: RADIOLOGY

## 2025-04-24 PROCEDURE — A9540 TC99M MAA: HCPCS | Performed by: RADIOLOGY

## 2025-04-24 PROCEDURE — A9539 TC99M PENTETATE: HCPCS | Performed by: RADIOLOGY

## 2025-04-24 RX ORDER — KIT FOR THE PREPARATION OF TECHNETIUM TC 99M PENTETATE 20 MG/1
35 INJECTION, POWDER, LYOPHILIZED, FOR SOLUTION INTRAVENOUS; RESPIRATORY (INHALATION)
Status: COMPLETED | OUTPATIENT
Start: 2025-04-24 | End: 2025-04-24

## 2025-04-24 RX ADMIN — Medication 6 MILLICURIE: at 13:41

## 2025-04-24 RX ADMIN — KIT FOR THE PREPARATION OF TECHNETIUM TC 99M PENTETATE 35 MILLICURIE: 20 INJECTION, POWDER, LYOPHILIZED, FOR SOLUTION INTRAVENOUS; RESPIRATORY (INHALATION) at 13:16

## 2025-04-25 ENCOUNTER — HOSPITAL ENCOUNTER (OUTPATIENT)
Dept: ULTRASOUND IMAGING | Age: 75
Discharge: HOME | End: 2025-04-27
Payer: MEDICARE

## 2025-04-25 DIAGNOSIS — E04.2 MULTINODULAR GOITER: ICD-10-CM

## 2025-04-25 PROCEDURE — 88172 CYTP DX EVAL FNA 1ST EA SITE: CPT

## 2025-04-25 PROCEDURE — 10006 FNA BX W/US GDN EA ADDL: CPT

## 2025-04-25 PROCEDURE — 88305 TISSUE EXAM BY PATHOLOGIST: CPT

## 2025-04-25 PROCEDURE — 88173 CYTOPATH EVAL FNA REPORT: CPT

## 2025-04-25 PROCEDURE — 10005 FNA BX W/US GDN 1ST LES: CPT

## 2025-04-25 NOTE — PROCEDURES
[facility]              [address] [place] [address]                           PULMONARY FUNCTION      PATIENT NAME: SHERWIN MARTIN            : 1950  MED REC NO: [id number]                        ROOM:   ACCOUNT NO: [id number]                       ADMIT DATE: 2025  PROVIDER: Orlin Abbott MD      DATE OF PROCEDURE: 2025    SURGEON:  Orlin Abbott MD    REFERRING PHYSICIAN:  ORLIN ABBOTT    ORDERING PHYSICIAN:  Dr. Orlin Abbott.    INTERPRETING PHYSICIAN:  Dr. Orlin Abbott.    CLINICAL INDICATION:  Shortness of breath.    PROCEDURE:  Pulmonary function test.    PURPOSE:  Diagnostic.    MEDICATIONS:  None listed.    Height 60 inches, weight 230 pounds, age 75, female.    COMMENTS:  The patient did the best that she could.  The patient was very pleasant, but unable to perform the plethysmography testing.  2.5 mg of albuterol aerosol treatment given prior to post.  I performed an SVC post treatment.    LUNG MECHANICS:  There appears to be a mild restrictive component of lung disease according to graphic and numeric representation of patient lung volumes.  Pattern of flow volume loop supports this.    FEV1/FVC is 0.90.    FEV1 is 1.31 L, 74% of predicted with a Z-score of -1.42.    FVC 1.47 L, 64% of predicted with a Z-score of -2.02.    MVV 47% of predicted.    Inspiratory phase of flow volume loop shows adequate upper airway laryngeal and pharyngeal function.  Expiratory phase of flow volume loop shows suboptimal patient effort, but no discernible hyperdynamic collapse in the large and the distal peripheral airways.  No post bronchodilator completed.    LUNG VOLUMES:  Show a significant reduction at ERV, which is 18%.  SVC and IC are within normal limits.  These values are repeatable to about 2% to 4%.    LUNG DIFFUSION:  DLCO corrected for alveolar volume is within normal limits at 105%, which reflects a normal

## 2025-04-28 ENCOUNTER — RESULTS FOLLOW-UP (OUTPATIENT)
Dept: ENDOCRINOLOGY | Age: 75
End: 2025-04-28

## 2025-04-28 LAB — NON-GYN CYTOLOGY REPORT: NORMAL

## 2025-04-29 NOTE — TELEPHONE ENCOUNTER
Notify patient  Biopsy to the right thyroid nodule came back benign but the left thyroid nodule result was inconclusive. we are running further molecular testing. They should be back in 2-3 weeks

## 2025-05-21 ENCOUNTER — TELEPHONE (OUTPATIENT)
Dept: ENDOCRINOLOGY | Age: 75
End: 2025-05-21

## 2025-05-22 NOTE — TELEPHONE ENCOUNTER
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  Suspicious result on Afirma carries 50% risk to be a cancer.  We usually recommend surgery for a suspicious results of Afirma.    Please check with the patient, if she wants to proceed with the surgery we will send her to surgery service.

## 2025-05-28 ENCOUNTER — TELEPHONE (OUTPATIENT)
Dept: ENDOCRINOLOGY | Age: 75
End: 2025-05-28

## 2025-05-28 ENCOUNTER — TELEPHONE (OUTPATIENT)
Dept: ENT CLINIC | Age: 75
End: 2025-05-28

## 2025-05-28 DIAGNOSIS — E04.2 MULTINODULAR GOITER: Primary | ICD-10-CM

## 2025-05-28 NOTE — TELEPHONE ENCOUNTER
I called and discussed the results with the family.  Giving the suspicious Afirma result and risk of cancer of 50% we generally recommend surgery in these cases.  I am going to send a referral to the surgeon for further discussion and management

## 2025-05-28 NOTE — TELEPHONE ENCOUNTER
LMOVM for patient to call office for referral appt for multinodular goiter    Can patient come to [place] office on 6/10/25 @ 1:00 pm?

## 2025-05-30 NOTE — TELEPHONE ENCOUNTER
Spoke with granddaughter she will talk to Chery to see if she would like to proceed with surgery and call me back

## 2025-06-10 ENCOUNTER — OFFICE VISIT (OUTPATIENT)
Dept: ENT CLINIC | Age: 75
End: 2025-06-10
Payer: MEDICARE

## 2025-06-10 VITALS
HEART RATE: 69 BPM | SYSTOLIC BLOOD PRESSURE: 164 MMHG | OXYGEN SATURATION: 95 % | TEMPERATURE: 98.8 F | WEIGHT: 219 LBS | DIASTOLIC BLOOD PRESSURE: 70 MMHG | HEIGHT: 60 IN | BODY MASS INDEX: 43 KG/M2

## 2025-06-10 DIAGNOSIS — E04.2 MULTINODULAR GOITER: ICD-10-CM

## 2025-06-10 DIAGNOSIS — Z01.812 PRE-PROCEDURAL LABORATORY EXAMINATION: Primary | ICD-10-CM

## 2025-06-10 DIAGNOSIS — E04.1 THYROID NODULE: ICD-10-CM

## 2025-06-10 PROCEDURE — 1159F MED LIST DOCD IN RCRD: CPT | Performed by: OTOLARYNGOLOGY

## 2025-06-10 PROCEDURE — 1036F TOBACCO NON-USER: CPT | Performed by: OTOLARYNGOLOGY

## 2025-06-10 PROCEDURE — G8400 PT W/DXA NO RESULTS DOC: HCPCS | Performed by: OTOLARYNGOLOGY

## 2025-06-10 PROCEDURE — 3077F SYST BP >= 140 MM HG: CPT | Performed by: OTOLARYNGOLOGY

## 2025-06-10 PROCEDURE — 3078F DIAST BP <80 MM HG: CPT | Performed by: OTOLARYNGOLOGY

## 2025-06-10 PROCEDURE — 99204 OFFICE O/P NEW MOD 45 MIN: CPT | Performed by: OTOLARYNGOLOGY

## 2025-06-10 PROCEDURE — G8417 CALC BMI ABV UP PARAM F/U: HCPCS | Performed by: OTOLARYNGOLOGY

## 2025-06-10 PROCEDURE — G8427 DOCREV CUR MEDS BY ELIG CLIN: HCPCS | Performed by: OTOLARYNGOLOGY

## 2025-06-10 PROCEDURE — 1090F PRES/ABSN URINE INCON ASSESS: CPT | Performed by: OTOLARYNGOLOGY

## 2025-06-10 PROCEDURE — 1123F ACP DISCUSS/DSCN MKR DOCD: CPT | Performed by: OTOLARYNGOLOGY

## 2025-06-10 ASSESSMENT — ENCOUNTER SYMPTOMS
RESPIRATORY NEGATIVE: 1
RHINORRHEA: 0
SORE THROAT: 0
ALLERGIC/IMMUNOLOGIC NEGATIVE: 1

## 2025-06-10 NOTE — PROGRESS NOTES
Thyroid: Thyromegaly present.      Trachea: No tracheal deviation.   Cardiovascular:      Rate and Rhythm: Normal rate.      Heart sounds: Normal heart sounds.   Pulmonary:      Effort: Pulmonary effort is normal. No respiratory distress.      Breath sounds: Normal breath sounds.   Musculoskeletal:      Cervical back: Normal range of motion and neck supple.   Lymphadenopathy:      Cervical: No cervical adenopathy.   Skin:     General: Skin is warm and dry.      Capillary Refill: Capillary refill takes less than 2 seconds.   Neurological:      Mental Status: She is oriented to person, place, and time.           Results  FINDINGS:  Right thyroid lobe: 4.4 x 1.4 x 2.2cm     Left thyroid lobe: 5.3 x 3.2 x 3.4cm     Isthmus: 0.7cm     Echotexture: Diffuse thyroid heterogeneity.     Vascularity: Increased vascularity.     Thyroid Nodules:     Bilateral thyroid nodules.  Largest and/or most concerning nodules will be  described.     NODULE: Right 1     Size: 19 x 15 x 16 mm     Location: Right upper pole     1. Composition:  Solid (2)     2. Echogenicity:  Hypoechoic (2)     3. Shape:  Wider-than-tall (0)     4. Margins:  Ill-defined (0)     5. Echogenic foci:  None (0)     ACR TI-RADS total points:  4     ACR TI-RADS risk category: TR 4     Prior biopsy: No     NODULE: Left 1     Size: 43 x 37 x 32 mm     Location: Left thyroid     1. Composition:  Solid (2)     2. Echogenicity:  Isoechoic (1)     3. Shape:  Wider-than-tall (0)     4. Margins:  Ill-defined (0)     5. Echogenic foci:  None (0)     ACR TI-RADS total points:  3     ACR TI-RADS risk category: TR 3     Prior biopsy: No     Soft tissues: No visualized lymphadenopathy     IMPRESSION:  1.  Heterogeneous and hypervascular thyroid gland.  Please correlate with the  patient's thyroid function test.     2.  Bilateral thyroid nodules.  The 43 mm left thyroid TR 3 nodule meets  criteria for FNA.  19 mm TR 4 nodule meets criteria for FNA.         Labs   - Biopsy of

## 2025-06-10 NOTE — PATIENT INSTRUCTIONS
[facility], [address], [place] will call you a couple days prior to surgery and give you further instructions, if you have any questions, you can reach them at [phone] (per Pre-Admission testing, EKG is required for all patients age 55+, have a diagnosis of hypertension, diabetes, or on dialysis).          [facility], [address] [place], [place] will call you a couple days prior to surgery and give you further instructions, if you have any questions, you can reach them at [phone] (per Pre-Admission testing, EKG is required for all patients age 55+, have a diagnosis of hypertension, diabetes, or on dialysis).          [facility] [address], [place] [address] will call you a couple days prior to surgery and give you further instructions, if you have any questions, you can reach them at [phone]    Pre-Surgery/Anesthesia Video ([facility] ONLY)  Located on [employer]    STEPS TO LOCATE VIDEO ONLINE:  1. Scroll over Health Information  2. Select Audio and Video  3. Select Virtual Tours  4. Select Your child and Anesthesia  5. Select Pre surgery Tour-[facility]

## 2025-06-12 ENCOUNTER — PREP FOR PROCEDURE (OUTPATIENT)
Dept: ENT CLINIC | Age: 75
End: 2025-06-12

## 2025-06-12 DIAGNOSIS — E04.2 MULTINODULAR GOITER: ICD-10-CM

## 2025-06-18 ENCOUNTER — HOSPITAL ENCOUNTER (OUTPATIENT)
Dept: CARDIOLOGY | Age: 75
Discharge: HOME | End: 2025-06-20
Attending: INTERNAL MEDICINE
Payer: MEDICARE

## 2025-06-18 VITALS
BODY MASS INDEX: 45.16 KG/M2 | HEIGHT: 60 IN | DIASTOLIC BLOOD PRESSURE: 85 MMHG | SYSTOLIC BLOOD PRESSURE: 134 MMHG | WEIGHT: 230 LBS

## 2025-06-18 DIAGNOSIS — I26.09 OTHER ACUTE PULMONARY EMBOLISM WITH ACUTE COR PULMONALE (HCC): ICD-10-CM

## 2025-06-18 DIAGNOSIS — R06.02 SHORTNESS OF BREATH: ICD-10-CM

## 2025-06-18 PROCEDURE — 93306 TTE W/DOPPLER COMPLETE: CPT

## 2025-06-20 LAB
ECHO AV AREA PEAK VELOCITY: 1.3 CM2
ECHO AV AREA VTI: 1.4 CM2
ECHO AV AREA/BSA PEAK VELOCITY: 0.7 CM2/M2
ECHO AV AREA/BSA VTI: 0.7 CM2/M2
ECHO AV CUSP MM: 0.9 CM
ECHO AV MEAN GRADIENT: 21 MMHG
ECHO AV MEAN VELOCITY: 2.2 M/S
ECHO AV PEAK GRADIENT: 39 MMHG
ECHO AV PEAK VELOCITY: 3.1 M/S
ECHO AV VELOCITY RATIO: 0.35
ECHO AV VTI: 61.6 CM
ECHO BSA: 2.1 M2
ECHO EST RA PRESSURE: 3 MMHG
ECHO LA DIAMETER INDEX: 2.02 CM/M2
ECHO LA DIAMETER: 4 CM
ECHO LA VOL A-L A2C: 58 ML (ref 22–52)
ECHO LA VOL A-L A4C: 60 ML (ref 22–52)
ECHO LA VOL MOD A2C: 55 ML (ref 22–52)
ECHO LA VOL MOD A4C: 56 ML (ref 22–52)
ECHO LA VOLUME AREA LENGTH: 60 ML
ECHO LA VOLUME INDEX A-L A2C: 29 ML/M2 (ref 16–34)
ECHO LA VOLUME INDEX A-L A4C: 30 ML/M2 (ref 16–34)
ECHO LA VOLUME INDEX AREA LENGTH: 30 ML/M2 (ref 16–34)
ECHO LA VOLUME INDEX MOD A2C: 28 ML/M2 (ref 16–34)
ECHO LA VOLUME INDEX MOD A4C: 28 ML/M2 (ref 16–34)
ECHO LV EF PHYSICIAN: 62 %
ECHO LV FRACTIONAL SHORTENING: 32 % (ref 28–44)
ECHO LV INTERNAL DIMENSION DIASTOLE INDEX: 2.53 CM/M2
ECHO LV INTERNAL DIMENSION DIASTOLIC: 5 CM (ref 3.9–5.3)
ECHO LV INTERNAL DIMENSION SYSTOLIC INDEX: 1.72 CM/M2
ECHO LV INTERNAL DIMENSION SYSTOLIC: 3.4 CM
ECHO LV IVSD: 1.1 CM (ref 0.6–0.9)
ECHO LV MASS 2D: 207.1 G (ref 67–162)
ECHO LV MASS INDEX 2D: 104.6 G/M2 (ref 43–95)
ECHO LV POSTERIOR WALL DIASTOLIC: 1.1 CM (ref 0.6–0.9)
ECHO LV RELATIVE WALL THICKNESS RATIO: 0.44
ECHO LVOT AREA: 3.8 CM2
ECHO LVOT AV VTI INDEX: 0.37
ECHO LVOT DIAM: 2.2 CM
ECHO LVOT MEAN GRADIENT: 2 MMHG
ECHO LVOT PEAK GRADIENT: 5 MMHG
ECHO LVOT PEAK VELOCITY: 1.1 M/S
ECHO LVOT STROKE VOLUME INDEX: 43.4 ML/M2
ECHO LVOT SV: 85.9 ML
ECHO LVOT VTI: 22.6 CM
ECHO MV A VELOCITY: 1.67 M/S
ECHO MV AREA PHT: 2.5 CM2
ECHO MV AREA VTI: 1.5 CM2
ECHO MV E DECELERATION TIME (DT): 256.9 MS
ECHO MV E VELOCITY: 1.46 M/S
ECHO MV E/A RATIO: 0.87
ECHO MV LVOT VTI INDEX: 2.6
ECHO MV MAX VELOCITY: 2 M/S
ECHO MV MEAN GRADIENT: 6 MMHG
ECHO MV MEAN VELOCITY: 1.1 M/S
ECHO MV PEAK GRADIENT: 16 MMHG
ECHO MV PRESSURE HALF TIME (PHT): 87.6 MS
ECHO MV VTI: 58.7 CM
ECHO RIGHT VENTRICULAR SYSTOLIC PRESSURE (RVSP): 22 MMHG
ECHO RV INTERNAL DIMENSION: 2.9 CM
ECHO TV REGURGITANT MAX VELOCITY: 2.2 M/S
ECHO TV REGURGITANT PEAK GRADIENT: 19 MMHG

## 2025-06-24 NOTE — PROGRESS NOTES
[facility] Sleep Medicine    Patient Name: Chery Hayden  Age: 75 y.o.   : 1950  Date of Visit: 25    Assessment and Plan:     1. MERCEDES (obstructive sleep apnea)  Discussed sleep study results, emphasized severity of study and diagnosis including possible clinical sequelae of microvascular disease including higher incidence of stroke, atrial fibrillation, hypertension, or other cognitive microvascular damage. Counseled on appropriate use of the device, such as using distilled water, daily cleaning of mask and tube with gentle soap and water or non-toxic wipes. Counseled on troubleshooting device (such as rainout) and being aware of the ambient humidity of the room.      2. BMI 40-45.  Rec'd 10-20% weight loss of total body weight (if feasible). Patient instructed on proper nutrition and estimated total daily caloric expenditure for their height and weight. Discussed that MERCEDES may improve with weight loss, but there is no guarantee of reversal.       History:    HPI   Chery Hayden is a 75 y.o. female with  has a past medical history of Arthritis, Bleeding ulcer, and Hypertension. who presents as a new patient to Sleep Clinic, referred by Dr. Abbott, for     SLEEP STUDY HISTORY    No specialty comments available.    - Here for results follow-up.  Recently, she had a sleep study that was severe on her home sleep testing.  She was recommended for sleep testing because of snoring and restless sleep as well as nocturia and excessive daytime sleepiness.  She would like to pursue auto CPAP therapy.  She is currently accompanied by an assistant.    PMH:  Past Medical History:   Diagnosis Date    Arthritis     Bleeding ulcer     Hypertension         PSH:  Past Surgical History:   Procedure Laterality Date     SECTION      CHOLECYSTECTOMY, LAPAROSCOPIC  2016    ERCP  2016    HYSTERECTOMY (CERVIX STATUS UNKNOWN)      JOINT REPLACEMENT Left     left knee        Soc Hx:  Social History

## 2025-06-25 ENCOUNTER — OFFICE VISIT (OUTPATIENT)
Dept: SLEEP CENTER | Age: 75
End: 2025-06-25
Payer: MEDICARE

## 2025-06-25 VITALS
SYSTOLIC BLOOD PRESSURE: 147 MMHG | HEIGHT: 60 IN | OXYGEN SATURATION: 95 % | WEIGHT: 229 LBS | HEART RATE: 71 BPM | TEMPERATURE: 97.9 F | BODY MASS INDEX: 44.96 KG/M2 | DIASTOLIC BLOOD PRESSURE: 76 MMHG | RESPIRATION RATE: 15 BRPM

## 2025-06-25 DIAGNOSIS — E66.01 MORBID OBESITY WITH BMI OF 40.0-44.9, ADULT (HCC): ICD-10-CM

## 2025-06-25 DIAGNOSIS — G47.33 OSA (OBSTRUCTIVE SLEEP APNEA): Primary | ICD-10-CM

## 2025-06-25 PROCEDURE — G8400 PT W/DXA NO RESULTS DOC: HCPCS | Performed by: STUDENT IN AN ORGANIZED HEALTH CARE EDUCATION/TRAINING PROGRAM

## 2025-06-25 PROCEDURE — 1090F PRES/ABSN URINE INCON ASSESS: CPT | Performed by: STUDENT IN AN ORGANIZED HEALTH CARE EDUCATION/TRAINING PROGRAM

## 2025-06-25 PROCEDURE — 1123F ACP DISCUSS/DSCN MKR DOCD: CPT | Performed by: STUDENT IN AN ORGANIZED HEALTH CARE EDUCATION/TRAINING PROGRAM

## 2025-06-25 PROCEDURE — 3078F DIAST BP <80 MM HG: CPT | Performed by: STUDENT IN AN ORGANIZED HEALTH CARE EDUCATION/TRAINING PROGRAM

## 2025-06-25 PROCEDURE — 1036F TOBACCO NON-USER: CPT | Performed by: STUDENT IN AN ORGANIZED HEALTH CARE EDUCATION/TRAINING PROGRAM

## 2025-06-25 PROCEDURE — G8417 CALC BMI ABV UP PARAM F/U: HCPCS | Performed by: STUDENT IN AN ORGANIZED HEALTH CARE EDUCATION/TRAINING PROGRAM

## 2025-06-25 PROCEDURE — 3077F SYST BP >= 140 MM HG: CPT | Performed by: STUDENT IN AN ORGANIZED HEALTH CARE EDUCATION/TRAINING PROGRAM

## 2025-06-25 PROCEDURE — G8428 CUR MEDS NOT DOCUMENT: HCPCS | Performed by: STUDENT IN AN ORGANIZED HEALTH CARE EDUCATION/TRAINING PROGRAM

## 2025-06-25 PROCEDURE — 99204 OFFICE O/P NEW MOD 45 MIN: CPT | Performed by: STUDENT IN AN ORGANIZED HEALTH CARE EDUCATION/TRAINING PROGRAM

## 2025-06-25 ASSESSMENT — SLEEP AND FATIGUE QUESTIONNAIRES
HOW LIKELY ARE YOU TO NOD OFF OR FALL ASLEEP WHILE LYING DOWN TO REST IN THE AFTERNOON WHEN CIRCUMSTANCES PERMIT: HIGH CHANCE OF DOZING
HOW LIKELY ARE YOU TO NOD OFF OR FALL ASLEEP IN A CAR, WHILE STOPPED FOR A FEW MINUTES IN TRAFFIC: SLIGHT CHANCE OF DOZING
HOW LIKELY ARE YOU TO NOD OFF OR FALL ASLEEP WHILE SITTING QUIETLY AFTER LUNCH WITHOUT ALCOHOL: HIGH CHANCE OF DOZING
HOW LIKELY ARE YOU TO NOD OFF OR FALL ASLEEP WHILE SITTING AND TALKING TO SOMEONE: SLIGHT CHANCE OF DOZING
ESS TOTAL SCORE: 18
HOW LIKELY ARE YOU TO NOD OFF OR FALL ASLEEP WHILE SITTING INACTIVE IN A PUBLIC PLACE: HIGH CHANCE OF DOZING
HOW LIKELY ARE YOU TO NOD OFF OR FALL ASLEEP WHILE WATCHING TV: HIGH CHANCE OF DOZING
HOW LIKELY ARE YOU TO NOD OFF OR FALL ASLEEP WHEN YOU ARE A PASSENGER IN A CAR FOR AN HOUR WITHOUT A BREAK: MODERATE CHANCE OF DOZING
HOW LIKELY ARE YOU TO NOD OFF OR FALL ASLEEP WHILE SITTING AND READING: MODERATE CHANCE OF DOZING

## 2025-06-26 ENCOUNTER — HOSPITAL ENCOUNTER (OUTPATIENT)
Age: 75
Discharge: HOME | End: 2025-06-26
Payer: MEDICARE

## 2025-06-26 ENCOUNTER — OFFICE VISIT (OUTPATIENT)
Age: 75
End: 2025-06-26
Payer: MEDICARE

## 2025-06-26 VITALS
RESPIRATION RATE: 18 BRPM | TEMPERATURE: 97.3 F | DIASTOLIC BLOOD PRESSURE: 70 MMHG | HEART RATE: 63 BPM | OXYGEN SATURATION: 95 % | SYSTOLIC BLOOD PRESSURE: 164 MMHG

## 2025-06-26 DIAGNOSIS — Z01.812 PRE-PROCEDURAL LABORATORY EXAMINATION: ICD-10-CM

## 2025-06-26 DIAGNOSIS — G47.33 OSA (OBSTRUCTIVE SLEEP APNEA): ICD-10-CM

## 2025-06-26 DIAGNOSIS — I26.09 OTHER PULMONARY EMBOLISM WITH ACUTE COR PULMONALE, UNSPECIFIED CHRONICITY (HCC): ICD-10-CM

## 2025-06-26 DIAGNOSIS — Z01.811 PRE-OPERATIVE RESPIRATORY EXAMINATION: Primary | ICD-10-CM

## 2025-06-26 DIAGNOSIS — E66.01 MORBID OBESITY (HCC): ICD-10-CM

## 2025-06-26 DIAGNOSIS — J98.4 RESTRICTIVE LUNG DISEASE: ICD-10-CM

## 2025-06-26 LAB
ANION GAP SERPL CALCULATED.3IONS-SCNC: 11 MMOL/L (ref 7–16)
BUN SERPL-MCNC: 31 MG/DL (ref 8–23)
CALCIUM SERPL-MCNC: 9 MG/DL (ref 8.8–10.2)
CHLORIDE SERPL-SCNC: 108 MMOL/L (ref 98–107)
CO2 SERPL-SCNC: 23 MMOL/L (ref 22–29)
CREAT SERPL-MCNC: 0.9 MG/DL (ref 0.5–1)
GFR, ESTIMATED: 65 ML/MIN/1.73M2
GLUCOSE SERPL-MCNC: 110 MG/DL (ref 74–99)
POTASSIUM SERPL-SCNC: 4.1 MMOL/L (ref 3.5–5.1)
SODIUM SERPL-SCNC: 143 MMOL/L (ref 136–145)

## 2025-06-26 PROCEDURE — 3077F SYST BP >= 140 MM HG: CPT | Performed by: INTERNAL MEDICINE

## 2025-06-26 PROCEDURE — 80048 BASIC METABOLIC PNL TOTAL CA: CPT

## 2025-06-26 PROCEDURE — 1159F MED LIST DOCD IN RCRD: CPT | Performed by: INTERNAL MEDICINE

## 2025-06-26 PROCEDURE — 3078F DIAST BP <80 MM HG: CPT | Performed by: INTERNAL MEDICINE

## 2025-06-26 PROCEDURE — 99214 OFFICE O/P EST MOD 30 MIN: CPT | Performed by: INTERNAL MEDICINE

## 2025-06-26 PROCEDURE — 36415 COLL VENOUS BLD VENIPUNCTURE: CPT

## 2025-06-26 PROCEDURE — G8400 PT W/DXA NO RESULTS DOC: HCPCS | Performed by: INTERNAL MEDICINE

## 2025-06-26 PROCEDURE — 1123F ACP DISCUSS/DSCN MKR DOCD: CPT | Performed by: INTERNAL MEDICINE

## 2025-06-26 PROCEDURE — G8427 DOCREV CUR MEDS BY ELIG CLIN: HCPCS | Performed by: INTERNAL MEDICINE

## 2025-06-26 PROCEDURE — 1090F PRES/ABSN URINE INCON ASSESS: CPT | Performed by: INTERNAL MEDICINE

## 2025-06-26 PROCEDURE — 1036F TOBACCO NON-USER: CPT | Performed by: INTERNAL MEDICINE

## 2025-06-26 PROCEDURE — G8417 CALC BMI ABV UP PARAM F/U: HCPCS | Performed by: INTERNAL MEDICINE

## 2025-06-26 NOTE — PROGRESS NOTES
Chery has been cleared for surgery. Only request from  is that she holds her ELIQUIS 2 days prior to surgery and then begin taking it again on 07/17/25.

## 2025-06-26 NOTE — PROGRESS NOTES
[facility]  PULMONARY/CRITICAL CARE PROGRESS NOTE    Patient: Chery Hayden  MRN: [id number]  : 1950    Encounter Date: 2025  Encounter Time: 8:46 AM     Reason for Follow Up: Pulmonary Embolism     Problem List:  Patient Active Problem List   Diagnosis    Pancreatitis, acute    Non-pressure chronic ulcer of other part of right lower leg with fat layer exposed (HCC)    Non-pressure chronic ulcer of lower leg, left, with fat layer exposed (HCC)    Venous stasis ulcer with edema of lower leg (HCC)    Acute hypoxemic respiratory failure due to COVID-19 (HCC)    Hypokalemia    Morbid obesity with BMI of 45.0-49.9, adult (HCC)    Abnormal urinalysis    Acute bronchospasm due to viral infection    Pulmonary embolus (HCC)    Multinodular goiter (nontoxic)    Primary hypertension    Multinodular goiter     SUBJECTIVE:   Chery Hayden is a 75 y.o. female with past medical history noted for PE, morbid obesity that presented to hospital for follow up.    Chery Hayden 74 y.o. female was seen in consultation regarding the above chief compliant.  Patient seen and examined.  For me she tells me that she came to the emergency room because her granddaughter \"did not like the way she looked \"she currently denies any symptoms for me of shortness of breath or coughing.  She denies any periods of immobilization.  She denies any recent surgeries.  She denies any former history of blood clots denies any familial history of blood clots she denies any personal history of cancer.     =================================================================    - Office Visit 3/13/2025:  Patient seen and examined.  Patient remains with no increased work of breathing, fevers, chills noted.    - Office Visit 3/13/2025:  Patient seen and examined.  Patient remains with no increased work of breathing, fevers, chills noted.  Patient now here for pre-operative clearance for thyroid surgery 2025.  Sleep study

## 2025-07-01 ENCOUNTER — HOSPITAL ENCOUNTER (OUTPATIENT)
Dept: CT IMAGING | Age: 75
Discharge: HOME | End: 2025-07-03
Attending: OTOLARYNGOLOGY
Payer: MEDICARE

## 2025-07-01 DIAGNOSIS — E04.2 MULTINODULAR GOITER: ICD-10-CM

## 2025-07-01 DIAGNOSIS — E04.1 THYROID NODULE: ICD-10-CM

## 2025-07-01 PROCEDURE — 70491 CT SOFT TISSUE NECK W/DYE: CPT

## 2025-07-01 PROCEDURE — 6360000004 HC RX CONTRAST MEDICATION: Performed by: RADIOLOGY

## 2025-07-01 RX ORDER — IOPAMIDOL 755 MG/ML
75 INJECTION, SOLUTION INTRAVASCULAR
Status: COMPLETED | OUTPATIENT
Start: 2025-07-01 | End: 2025-07-01

## 2025-07-01 RX ADMIN — IOPAMIDOL 75 ML: 755 INJECTION, SOLUTION INTRAVENOUS at 10:05

## 2025-07-11 RX ORDER — VITAMIN B COMPLEX
1 CAPSULE ORAL DAILY
COMMUNITY

## 2025-07-11 RX ORDER — FOLIC ACID 1 MG/1
1 TABLET ORAL DAILY
COMMUNITY

## 2025-07-11 NOTE — PROGRESS NOTES
[facility] PRE-ADMISSION TESTING INSTRUCTIONS    The Preadmission Testing patient is instructed accordingly using the following criteria (check applicable):    ARRIVAL INSTRUCTIONS:  [x] Parking the day of Surgery is located in the Main Entrance lot.  Upon entering the door, make an immediate right to the surgery reception desk    [x] Bring photo ID and insurance card    [x] Bring in a copy of Living will or Durable Power of  papers.    [x] Please be sure to arrange for responsible adult to provide transportation to and from the hospital    [x] Please arrange for responsible adult to be with you for the 24 hour period post procedure due to having anesthesia    [x] If you awake am of surgery not feeling well or have temperature >100 please call [phone]    GENERAL INSTRUCTIONS:    [x] Nothing to eat or drink after midnight prior to surgery.            No gum, candy, mints, or solid food    [x] You may brush your teeth, but do not swallow any water    [x] Take medications as instructed with 1-2 oz of water    [x] Stop herbal supplements and vitamins 5 days prior to procedure    [x] Follow preop dosing of blood thinners per physician instructions. Hold Eliquis 3 day prior to DOS    [x] No oral diabetic medications after midnight    [x] If diabetic and have low blood sugar or feel symptomatic, take 1-2oz apple juice only    [x] Shower or bath with soap, lather and rinse well, AM of Surgery, no lotion, powders or creams     [x] No tobacco products within 24 hours of surgery     [x] No alcohol or illegal drug use within 24 hours of surgery.    [x] Jewelry, body piercing's, eyeglasses, contact lenses and dentures are not permitted into surgery (bring cases)     [x] You can expect a call the business day prior to procedure to notify you if your arrival time changes    [x] If you receive a survey after surgery we would greatly appreciate your comments    [x] A caregiver or family member  must remain with the patient during their stay if they are mentally handicapped, have dementia, disoriented or unable to use a call light or would be a safety concern if left unattended    [x] Please notify surgeon if you develop any illness between now and time of surgery (cold, cough, sore throat, fever, nausea, vomiting) or any signs of infections  including skin, wounds, and dental.    [x]  The Outpatient Pharmacy is available to fill your prescription here on your day of surgery, ask your preop nurse for details    [x] Other instructionsm: wear loose, comfortable clothing

## 2025-07-16 ENCOUNTER — ANESTHESIA (OUTPATIENT)
Dept: OPERATING ROOM | Age: 75
End: 2025-07-16
Payer: MEDICARE

## 2025-07-16 ENCOUNTER — ANESTHESIA EVENT (OUTPATIENT)
Dept: OPERATING ROOM | Age: 75
End: 2025-07-16
Payer: MEDICARE

## 2025-07-16 ENCOUNTER — HOSPITAL ENCOUNTER (OUTPATIENT)
Age: 75
Setting detail: OBSERVATION
Discharge: HOME | End: 2025-07-17
Attending: OTOLARYNGOLOGY | Admitting: OTOLARYNGOLOGY
Payer: MEDICARE

## 2025-07-16 DIAGNOSIS — G89.18 POST-OP PAIN: Primary | ICD-10-CM

## 2025-07-16 DIAGNOSIS — E04.2 MULTINODULAR GOITER: ICD-10-CM

## 2025-07-16 PROBLEM — Z90.89 S/P TOTAL THYROIDECTOMY: Status: ACTIVE | Noted: 2025-07-16

## 2025-07-16 PROBLEM — Z98.890 S/P TOTAL THYROIDECTOMY: Status: ACTIVE | Noted: 2025-07-16

## 2025-07-16 LAB
ANION GAP SERPL CALCULATED.3IONS-SCNC: 13 MMOL/L (ref 7–16)
BUN SERPL-MCNC: 32 MG/DL (ref 8–23)
CA-I BLD-SCNC: 1.11 MMOL/L (ref 1.15–1.33)
CA-I BLD-SCNC: 1.15 MMOL/L (ref 1.15–1.33)
CALCIUM SERPL-MCNC: 8.4 MG/DL (ref 8.8–10.2)
CALCIUM SERPL-MCNC: 8.7 MG/DL (ref 8.8–10.2)
CALCIUM SERPL-MCNC: 8.9 MG/DL (ref 8.8–10.2)
CHLORIDE SERPL-SCNC: 108 MMOL/L (ref 98–107)
CO2 SERPL-SCNC: 21 MMOL/L (ref 22–29)
CREAT SERPL-MCNC: 0.9 MG/DL (ref 0.5–1)
ERYTHROCYTE [DISTWIDTH] IN BLOOD BY AUTOMATED COUNT: 17.3 % (ref 11.5–15)
GFR, ESTIMATED: 63 ML/MIN/1.73M2
GLUCOSE BLD-MCNC: 144 MG/DL (ref 74–99)
GLUCOSE BLD-MCNC: 156 MG/DL (ref 74–99)
GLUCOSE BLD-MCNC: 177 MG/DL (ref 74–99)
GLUCOSE BLD-MCNC: 199 MG/DL (ref 74–99)
GLUCOSE SERPL-MCNC: 111 MG/DL (ref 74–99)
HCT VFR BLD AUTO: 40.4 % (ref 34–48)
HGB BLD-MCNC: 12 G/DL (ref 11.5–15.5)
MCH RBC QN AUTO: 26.5 PG (ref 26–35)
MCHC RBC AUTO-ENTMCNC: 29.7 G/DL (ref 32–34.5)
MCV RBC AUTO: 89.4 FL (ref 80–99.9)
PLATELET # BLD AUTO: 246 K/UL (ref 130–450)
PMV BLD AUTO: 10.5 FL (ref 7–12)
POTASSIUM SERPL-SCNC: 4.8 MMOL/L (ref 3.5–5.1)
PTH-INTACT SERPL-MCNC: 6 PG/ML (ref 15–65)
PTH-INTACT SERPL-MCNC: 75 PG/ML (ref 15–65)
RBC # BLD AUTO: 4.52 M/UL (ref 3.5–5.5)
SODIUM SERPL-SCNC: 141 MMOL/L (ref 136–145)
WBC OTHER # BLD: 9.2 K/UL (ref 4.5–11.5)

## 2025-07-16 PROCEDURE — 2500000003 HC RX 250 WO HCPCS

## 2025-07-16 PROCEDURE — 6370000000 HC RX 637 (ALT 250 FOR IP)

## 2025-07-16 PROCEDURE — 7100000001 HC PACU RECOVERY - ADDTL 15 MIN: Performed by: OTOLARYNGOLOGY

## 2025-07-16 PROCEDURE — 6360000002 HC RX W HCPCS

## 2025-07-16 PROCEDURE — 3700000001 HC ADD 15 MINUTES (ANESTHESIA): Performed by: OTOLARYNGOLOGY

## 2025-07-16 PROCEDURE — 94664 DEMO&/EVAL PT USE INHALER: CPT

## 2025-07-16 PROCEDURE — 82962 GLUCOSE BLOOD TEST: CPT

## 2025-07-16 PROCEDURE — 2500000003 HC RX 250 WO HCPCS: Performed by: INTERNAL MEDICINE

## 2025-07-16 PROCEDURE — G0378 HOSPITAL OBSERVATION PER HR: HCPCS

## 2025-07-16 PROCEDURE — 88307 TISSUE EXAM BY PATHOLOGIST: CPT

## 2025-07-16 PROCEDURE — 6370000000 HC RX 637 (ALT 250 FOR IP): Performed by: ANESTHESIOLOGY

## 2025-07-16 PROCEDURE — 6370000000 HC RX 637 (ALT 250 FOR IP): Performed by: INTERNAL MEDICINE

## 2025-07-16 PROCEDURE — 7100000000 HC PACU RECOVERY - FIRST 15 MIN: Performed by: OTOLARYNGOLOGY

## 2025-07-16 PROCEDURE — 2709999900 HC NON-CHARGEABLE SUPPLY: Performed by: OTOLARYNGOLOGY

## 2025-07-16 PROCEDURE — 82330 ASSAY OF CALCIUM: CPT

## 2025-07-16 PROCEDURE — 3600000003 HC SURGERY LEVEL 3 BASE: Performed by: OTOLARYNGOLOGY

## 2025-07-16 PROCEDURE — 2580000003 HC RX 258

## 2025-07-16 PROCEDURE — 80048 BASIC METABOLIC PNL TOTAL CA: CPT

## 2025-07-16 PROCEDURE — 2720000010 HC SURG SUPPLY STERILE: Performed by: OTOLARYNGOLOGY

## 2025-07-16 PROCEDURE — 3600000013 HC SURGERY LEVEL 3 ADDTL 15MIN: Performed by: OTOLARYNGOLOGY

## 2025-07-16 PROCEDURE — 95941 IONM REMOTE/>1 PT OR PER HR: CPT | Performed by: OTOLARYNGOLOGY

## 2025-07-16 PROCEDURE — 83970 ASSAY OF PARATHORMONE: CPT

## 2025-07-16 PROCEDURE — 85027 COMPLETE CBC AUTOMATED: CPT

## 2025-07-16 PROCEDURE — 82310 ASSAY OF CALCIUM: CPT

## 2025-07-16 PROCEDURE — 3700000000 HC ANESTHESIA ATTENDED CARE: Performed by: OTOLARYNGOLOGY

## 2025-07-16 PROCEDURE — 6360000002 HC RX W HCPCS: Performed by: OTOLARYNGOLOGY

## 2025-07-16 PROCEDURE — 60240 REMOVAL OF THYROID: CPT | Performed by: OTOLARYNGOLOGY

## 2025-07-16 RX ORDER — IPRATROPIUM BROMIDE AND ALBUTEROL SULFATE 2.5; .5 MG/3ML; MG/3ML
1 SOLUTION RESPIRATORY (INHALATION)
Status: DISCONTINUED | OUTPATIENT
Start: 2025-07-16 | End: 2025-07-16 | Stop reason: HOSPADM

## 2025-07-16 RX ORDER — LEVOTHYROXINE SODIUM 175 UG/1
175 TABLET ORAL DAILY
Qty: 30 TABLET | Refills: 1 | Status: SHIPPED | OUTPATIENT
Start: 2025-07-16 | End: 2025-07-17

## 2025-07-16 RX ORDER — DEXAMETHASONE SODIUM PHOSPHATE 4 MG/ML
INJECTION, SOLUTION INTRA-ARTICULAR; INTRALESIONAL; INTRAMUSCULAR; INTRAVENOUS; SOFT TISSUE
Status: DISCONTINUED | OUTPATIENT
Start: 2025-07-16 | End: 2025-07-16 | Stop reason: SDUPTHER

## 2025-07-16 RX ORDER — SODIUM CHLORIDE 9 MG/ML
INJECTION, SOLUTION INTRAVENOUS PRN
Status: DISCONTINUED | OUTPATIENT
Start: 2025-07-16 | End: 2025-07-17 | Stop reason: HOSPADM

## 2025-07-16 RX ORDER — SODIUM CHLORIDE 0.9 % (FLUSH) 0.9 %
5-40 SYRINGE (ML) INJECTION PRN
Status: DISCONTINUED | OUTPATIENT
Start: 2025-07-16 | End: 2025-07-16 | Stop reason: HOSPADM

## 2025-07-16 RX ORDER — ACETAMINOPHEN 325 MG/1
650 TABLET ORAL EVERY 6 HOURS
Status: DISCONTINUED | OUTPATIENT
Start: 2025-07-16 | End: 2025-07-16

## 2025-07-16 RX ORDER — OXYCODONE HYDROCHLORIDE 5 MG/1
5 TABLET ORAL EVERY 4 HOURS PRN
Refills: 0 | Status: DISCONTINUED | OUTPATIENT
Start: 2025-07-16 | End: 2025-07-17 | Stop reason: HOSPADM

## 2025-07-16 RX ORDER — HYDRALAZINE HYDROCHLORIDE 20 MG/ML
10 INJECTION INTRAMUSCULAR; INTRAVENOUS
Status: DISCONTINUED | OUTPATIENT
Start: 2025-07-16 | End: 2025-07-16 | Stop reason: HOSPADM

## 2025-07-16 RX ORDER — SODIUM CHLORIDE, SODIUM LACTATE, POTASSIUM CHLORIDE, CALCIUM CHLORIDE 600; 310; 30; 20 MG/100ML; MG/100ML; MG/100ML; MG/100ML
INJECTION, SOLUTION INTRAVENOUS CONTINUOUS
Status: DISCONTINUED | OUTPATIENT
Start: 2025-07-16 | End: 2025-07-16

## 2025-07-16 RX ORDER — SUCCINYLCHOLINE/SOD CL,ISO/PF 200MG/10ML
SYRINGE (ML) INTRAVENOUS
Status: DISCONTINUED | OUTPATIENT
Start: 2025-07-16 | End: 2025-07-16 | Stop reason: SDUPTHER

## 2025-07-16 RX ORDER — CEPHALEXIN 500 MG/1
500 CAPSULE ORAL 2 TIMES DAILY
Qty: 14 CAPSULE | Refills: 0 | Status: SHIPPED | OUTPATIENT
Start: 2025-07-16 | End: 2025-07-23

## 2025-07-16 RX ORDER — DEXTROSE MONOHYDRATE 100 MG/ML
INJECTION, SOLUTION INTRAVENOUS CONTINUOUS PRN
Status: DISCONTINUED | OUTPATIENT
Start: 2025-07-16 | End: 2025-07-17 | Stop reason: HOSPADM

## 2025-07-16 RX ORDER — MIDAZOLAM HYDROCHLORIDE 2 MG/2ML
2 INJECTION, SOLUTION INTRAMUSCULAR; INTRAVENOUS
Status: DISCONTINUED | OUTPATIENT
Start: 2025-07-16 | End: 2025-07-16 | Stop reason: HOSPADM

## 2025-07-16 RX ORDER — SODIUM CHLORIDE 0.9 % (FLUSH) 0.9 %
5-40 SYRINGE (ML) INJECTION EVERY 12 HOURS SCHEDULED
Status: DISCONTINUED | OUTPATIENT
Start: 2025-07-16 | End: 2025-07-16 | Stop reason: HOSPADM

## 2025-07-16 RX ORDER — SODIUM CHLORIDE 9 MG/ML
INJECTION, SOLUTION INTRAVENOUS PRN
Status: DISCONTINUED | OUTPATIENT
Start: 2025-07-16 | End: 2025-07-16 | Stop reason: HOSPADM

## 2025-07-16 RX ORDER — PROCHLORPERAZINE EDISYLATE 5 MG/ML
5 INJECTION INTRAMUSCULAR; INTRAVENOUS
Status: DISCONTINUED | OUTPATIENT
Start: 2025-07-16 | End: 2025-07-16 | Stop reason: HOSPADM

## 2025-07-16 RX ORDER — MEPERIDINE HYDROCHLORIDE 50 MG/ML
12.5 INJECTION INTRAMUSCULAR; INTRAVENOUS; SUBCUTANEOUS EVERY 5 MIN PRN
Status: DISCONTINUED | OUTPATIENT
Start: 2025-07-16 | End: 2025-07-16 | Stop reason: HOSPADM

## 2025-07-16 RX ORDER — IBUPROFEN 800 MG/1
400 TABLET, FILM COATED ORAL EVERY 12 HOURS
Status: DISCONTINUED | OUTPATIENT
Start: 2025-07-16 | End: 2025-07-16

## 2025-07-16 RX ORDER — PHENYLEPHRINE HCL IN 0.9% NACL 1 MG/10 ML
SYRINGE (ML) INTRAVENOUS
Status: DISCONTINUED | OUTPATIENT
Start: 2025-07-16 | End: 2025-07-16 | Stop reason: SDUPTHER

## 2025-07-16 RX ORDER — ONDANSETRON 2 MG/ML
4 INJECTION INTRAMUSCULAR; INTRAVENOUS
Status: DISCONTINUED | OUTPATIENT
Start: 2025-07-16 | End: 2025-07-16 | Stop reason: HOSPADM

## 2025-07-16 RX ORDER — HYDROCODONE BITARTRATE AND ACETAMINOPHEN 5; 325 MG/1; MG/1
1 TABLET ORAL EVERY 6 HOURS PRN
Qty: 12 TABLET | Refills: 0 | Status: SHIPPED | OUTPATIENT
Start: 2025-07-16 | End: 2025-07-17 | Stop reason: HOSPADM

## 2025-07-16 RX ORDER — POTASSIUM CHLORIDE 1500 MG/1
20 TABLET, EXTENDED RELEASE ORAL DAILY
Status: DISCONTINUED | OUTPATIENT
Start: 2025-07-16 | End: 2025-07-17 | Stop reason: HOSPADM

## 2025-07-16 RX ORDER — INSULIN LISPRO 100 [IU]/ML
0-4 INJECTION, SOLUTION INTRAVENOUS; SUBCUTANEOUS
Status: DISCONTINUED | OUTPATIENT
Start: 2025-07-16 | End: 2025-07-17 | Stop reason: HOSPADM

## 2025-07-16 RX ORDER — LISINOPRIL AND HYDROCHLOROTHIAZIDE 20; 25 MG/1; MG/1
1 TABLET ORAL DAILY
Status: DISCONTINUED | OUTPATIENT
Start: 2025-07-16 | End: 2025-07-16

## 2025-07-16 RX ORDER — ESMOLOL HYDROCHLORIDE 10 MG/ML
INJECTION INTRAVENOUS
Status: DISCONTINUED | OUTPATIENT
Start: 2025-07-16 | End: 2025-07-16 | Stop reason: SDUPTHER

## 2025-07-16 RX ORDER — POLYETHYLENE GLYCOL 3350 17 G/17G
17 POWDER, FOR SOLUTION ORAL DAILY PRN
Status: DISCONTINUED | OUTPATIENT
Start: 2025-07-16 | End: 2025-07-17 | Stop reason: HOSPADM

## 2025-07-16 RX ORDER — HYDROCHLOROTHIAZIDE 25 MG/1
25 TABLET ORAL DAILY
Status: DISCONTINUED | OUTPATIENT
Start: 2025-07-16 | End: 2025-07-17 | Stop reason: HOSPADM

## 2025-07-16 RX ORDER — SODIUM CHLORIDE 0.9 % (FLUSH) 0.9 %
5-40 SYRINGE (ML) INJECTION PRN
Status: DISCONTINUED | OUTPATIENT
Start: 2025-07-16 | End: 2025-07-17 | Stop reason: HOSPADM

## 2025-07-16 RX ORDER — LABETALOL HYDROCHLORIDE 5 MG/ML
10 INJECTION, SOLUTION INTRAVENOUS
Status: DISCONTINUED | OUTPATIENT
Start: 2025-07-16 | End: 2025-07-16 | Stop reason: HOSPADM

## 2025-07-16 RX ORDER — LEVOTHYROXINE SODIUM 125 UG/1
125 TABLET ORAL DAILY
Status: DISCONTINUED | OUTPATIENT
Start: 2025-07-17 | End: 2025-07-16

## 2025-07-16 RX ORDER — LISINOPRIL 20 MG/1
20 TABLET ORAL DAILY
Status: DISCONTINUED | OUTPATIENT
Start: 2025-07-16 | End: 2025-07-17 | Stop reason: HOSPADM

## 2025-07-16 RX ORDER — SODIUM CHLORIDE, SODIUM LACTATE, POTASSIUM CHLORIDE, CALCIUM CHLORIDE 600; 310; 30; 20 MG/100ML; MG/100ML; MG/100ML; MG/100ML
INJECTION, SOLUTION INTRAVENOUS CONTINUOUS
Status: DISCONTINUED | OUTPATIENT
Start: 2025-07-16 | End: 2025-07-17

## 2025-07-16 RX ORDER — ONDANSETRON 4 MG/1
4 TABLET, ORALLY DISINTEGRATING ORAL EVERY 8 HOURS PRN
Status: DISCONTINUED | OUTPATIENT
Start: 2025-07-16 | End: 2025-07-17 | Stop reason: HOSPADM

## 2025-07-16 RX ORDER — ONDANSETRON 2 MG/ML
4 INJECTION INTRAMUSCULAR; INTRAVENOUS EVERY 6 HOURS PRN
Status: DISCONTINUED | OUTPATIENT
Start: 2025-07-16 | End: 2025-07-17 | Stop reason: HOSPADM

## 2025-07-16 RX ORDER — IPRATROPIUM BROMIDE AND ALBUTEROL SULFATE 2.5; .5 MG/3ML; MG/3ML
1 SOLUTION RESPIRATORY (INHALATION) ONCE
Status: COMPLETED | OUTPATIENT
Start: 2025-07-16 | End: 2025-07-16

## 2025-07-16 RX ORDER — FOLIC ACID 1 MG/1
1 TABLET ORAL DAILY
Status: DISCONTINUED | OUTPATIENT
Start: 2025-07-16 | End: 2025-07-17 | Stop reason: HOSPADM

## 2025-07-16 RX ORDER — ONDANSETRON 2 MG/ML
INJECTION INTRAMUSCULAR; INTRAVENOUS
Status: DISCONTINUED | OUTPATIENT
Start: 2025-07-16 | End: 2025-07-16 | Stop reason: SDUPTHER

## 2025-07-16 RX ORDER — CALCIUM CARBONATE 500(1250)
1000 TABLET ORAL 3 TIMES DAILY
Status: DISCONTINUED | OUTPATIENT
Start: 2025-07-16 | End: 2025-07-17

## 2025-07-16 RX ORDER — SODIUM CHLORIDE 0.9 % (FLUSH) 0.9 %
5-40 SYRINGE (ML) INJECTION EVERY 12 HOURS SCHEDULED
Status: DISCONTINUED | OUTPATIENT
Start: 2025-07-16 | End: 2025-07-17 | Stop reason: HOSPADM

## 2025-07-16 RX ORDER — AMLODIPINE BESYLATE 5 MG/1
5 TABLET ORAL DAILY
Status: DISCONTINUED | OUTPATIENT
Start: 2025-07-16 | End: 2025-07-17 | Stop reason: HOSPADM

## 2025-07-16 RX ORDER — PROPOFOL 10 MG/ML
INJECTION, EMULSION INTRAVENOUS
Status: DISCONTINUED | OUTPATIENT
Start: 2025-07-16 | End: 2025-07-16 | Stop reason: SDUPTHER

## 2025-07-16 RX ORDER — FENTANYL CITRATE 50 UG/ML
INJECTION, SOLUTION INTRAMUSCULAR; INTRAVENOUS
Status: DISCONTINUED | OUTPATIENT
Start: 2025-07-16 | End: 2025-07-16 | Stop reason: SDUPTHER

## 2025-07-16 RX ORDER — LIDOCAINE HYDROCHLORIDE 20 MG/ML
INJECTION, SOLUTION EPIDURAL; INFILTRATION; INTRACAUDAL; PERINEURAL
Status: DISCONTINUED | OUTPATIENT
Start: 2025-07-16 | End: 2025-07-16 | Stop reason: SDUPTHER

## 2025-07-16 RX ORDER — ALLOPURINOL 300 MG/1
300 TABLET ORAL DAILY
Status: DISCONTINUED | OUTPATIENT
Start: 2025-07-16 | End: 2025-07-17 | Stop reason: HOSPADM

## 2025-07-16 RX ORDER — ACETAMINOPHEN 325 MG/1
650 TABLET ORAL EVERY 6 HOURS
Status: DISCONTINUED | OUTPATIENT
Start: 2025-07-16 | End: 2025-07-17 | Stop reason: HOSPADM

## 2025-07-16 RX ORDER — GLUCAGON 1 MG/ML
1 KIT INJECTION PRN
Status: DISCONTINUED | OUTPATIENT
Start: 2025-07-16 | End: 2025-07-17 | Stop reason: HOSPADM

## 2025-07-16 RX ORDER — LIDOCAINE HYDROCHLORIDE AND EPINEPHRINE 10; 10 MG/ML; UG/ML
INJECTION, SOLUTION INFILTRATION; PERINEURAL PRN
Status: DISCONTINUED | OUTPATIENT
Start: 2025-07-16 | End: 2025-07-16 | Stop reason: ALTCHOICE

## 2025-07-16 RX ADMIN — ACETAMINOPHEN 650 MG: 325 TABLET ORAL at 20:59

## 2025-07-16 RX ADMIN — MICONAZOLE NITRATE: 20 POWDER TOPICAL at 22:21

## 2025-07-16 RX ADMIN — SODIUM CHLORIDE, SODIUM LACTATE, POTASSIUM CHLORIDE, AND CALCIUM CHLORIDE: .6; .31; .03; .02 INJECTION, SOLUTION INTRAVENOUS at 17:55

## 2025-07-16 RX ADMIN — PROPOFOL 140 MG: 10 INJECTION, EMULSION INTRAVENOUS at 12:34

## 2025-07-16 RX ADMIN — ONDANSETRON 4 MG: 2 INJECTION INTRAMUSCULAR; INTRAVENOUS at 14:03

## 2025-07-16 RX ADMIN — Medication 200 MG: at 12:34

## 2025-07-16 RX ADMIN — Medication 100 MCG: at 13:17

## 2025-07-16 RX ADMIN — ESMOLOL HYDROCHLORIDE 20 MG: 10 INJECTION, SOLUTION INTRAVENOUS at 14:24

## 2025-07-16 RX ADMIN — FENTANYL CITRATE 25 MCG: 50 INJECTION, SOLUTION INTRAMUSCULAR; INTRAVENOUS at 12:59

## 2025-07-16 RX ADMIN — FENTANYL CITRATE 25 MCG: 50 INJECTION, SOLUTION INTRAMUSCULAR; INTRAVENOUS at 13:22

## 2025-07-16 RX ADMIN — AMLODIPINE BESYLATE 5 MG: 5 TABLET ORAL at 18:11

## 2025-07-16 RX ADMIN — SODIUM CHLORIDE: 9 INJECTION, SOLUTION INTRAVENOUS at 12:23

## 2025-07-16 RX ADMIN — Medication 100 MCG: at 12:52

## 2025-07-16 RX ADMIN — LISINOPRIL 20 MG: 20 TABLET ORAL at 18:11

## 2025-07-16 RX ADMIN — HYDROCHLOROTHIAZIDE 25 MG: 25 TABLET ORAL at 18:11

## 2025-07-16 RX ADMIN — FENTANYL CITRATE 25 MCG: 50 INJECTION, SOLUTION INTRAMUSCULAR; INTRAVENOUS at 13:07

## 2025-07-16 RX ADMIN — LIDOCAINE HYDROCHLORIDE 60 MG: 20 INJECTION, SOLUTION EPIDURAL; INFILTRATION; INTRACAUDAL; PERINEURAL at 12:34

## 2025-07-16 RX ADMIN — WATER 2000 MG: 1 INJECTION INTRAMUSCULAR; INTRAVENOUS; SUBCUTANEOUS at 12:42

## 2025-07-16 RX ADMIN — PROPOFOL 40 MG: 10 INJECTION, EMULSION INTRAVENOUS at 13:21

## 2025-07-16 RX ADMIN — CALCIUM 1000 MG: 500 TABLET ORAL at 20:58

## 2025-07-16 RX ADMIN — FENTANYL CITRATE 25 MCG: 50 INJECTION, SOLUTION INTRAMUSCULAR; INTRAVENOUS at 13:38

## 2025-07-16 RX ADMIN — FENTANYL CITRATE 100 MCG: 50 INJECTION, SOLUTION INTRAMUSCULAR; INTRAVENOUS at 12:42

## 2025-07-16 RX ADMIN — Medication 100 MCG: at 12:51

## 2025-07-16 RX ADMIN — PROPOFOL 60 MG: 10 INJECTION, EMULSION INTRAVENOUS at 12:55

## 2025-07-16 RX ADMIN — Medication 20 MG: at 12:42

## 2025-07-16 RX ADMIN — INSULIN LISPRO 1 UNITS: 100 INJECTION, SOLUTION INTRAVENOUS; SUBCUTANEOUS at 22:21

## 2025-07-16 RX ADMIN — IPRATROPIUM BROMIDE AND ALBUTEROL SULFATE 1 DOSE: 2.5; .5 SOLUTION RESPIRATORY (INHALATION) at 11:19

## 2025-07-16 RX ADMIN — LEVOTHYROXINE SODIUM 125 MCG: 0.1 TABLET ORAL at 18:10

## 2025-07-16 RX ADMIN — DEXAMETHASONE SODIUM PHOSPHATE 12 MG: 4 INJECTION, SOLUTION INTRAMUSCULAR; INTRAVENOUS at 12:42

## 2025-07-16 RX ADMIN — FENTANYL CITRATE 50 MCG: 50 INJECTION, SOLUTION INTRAMUSCULAR; INTRAVENOUS at 13:46

## 2025-07-16 RX ADMIN — ESMOLOL HYDROCHLORIDE 30 MG: 10 INJECTION, SOLUTION INTRAVENOUS at 14:09

## 2025-07-16 RX ADMIN — OXYCODONE 5 MG: 5 TABLET ORAL at 17:48

## 2025-07-16 RX ADMIN — Medication 10 MG: at 13:38

## 2025-07-16 ASSESSMENT — ENCOUNTER SYMPTOMS: SHORTNESS OF BREATH: 1

## 2025-07-16 ASSESSMENT — PAIN SCALES - GENERAL
PAINLEVEL_OUTOF10: 0
PAINLEVEL_OUTOF10: 7
PAINLEVEL_OUTOF10: 0
PAINLEVEL_OUTOF10: 9
PAINLEVEL_OUTOF10: 0
PAINLEVEL_OUTOF10: 0
PAINLEVEL_OUTOF10: 4
PAINLEVEL_OUTOF10: 0
PAINLEVEL_OUTOF10: 6

## 2025-07-16 ASSESSMENT — PAIN DESCRIPTION - DESCRIPTORS
DESCRIPTORS: DISCOMFORT;SORE
DESCRIPTORS: ACHING;SORE

## 2025-07-16 ASSESSMENT — PAIN DESCRIPTION - ONSET: ONSET: ON-GOING

## 2025-07-16 ASSESSMENT — PAIN DESCRIPTION - FREQUENCY: FREQUENCY: CONTINUOUS

## 2025-07-16 ASSESSMENT — LIFESTYLE VARIABLES: SMOKING_STATUS: 0

## 2025-07-16 ASSESSMENT — PAIN - FUNCTIONAL ASSESSMENT
PAIN_FUNCTIONAL_ASSESSMENT: 0-10
PAIN_FUNCTIONAL_ASSESSMENT: ACTIVITIES ARE NOT PREVENTED
PAIN_FUNCTIONAL_ASSESSMENT: ACTIVITIES ARE NOT PREVENTED

## 2025-07-16 ASSESSMENT — PAIN DESCRIPTION - PAIN TYPE: TYPE: SURGICAL PAIN

## 2025-07-16 ASSESSMENT — PAIN DESCRIPTION - LOCATION
LOCATION: NECK
LOCATION: THROAT
LOCATION: NECK
LOCATION: NECK
LOCATION: THROAT
LOCATION: NECK

## 2025-07-16 ASSESSMENT — PAIN DESCRIPTION - ORIENTATION
ORIENTATION: ANTERIOR
ORIENTATION: MID

## 2025-07-16 NOTE — CARE COORDINATION
Internal Medicine On-call Care Coordination Note    I was called by the floor because internal medicine was consulted for medical management.  The patient underwent a total thyroidectomy today.    I placed orders as noted below:    Multinodular goiter:  SP total thyroidectomy with nerve monitoring 7/16  Electrolyte monitoring per ENT  Synthroid  Endocrinology as an OP    Essential hypertension:  Continue lisinopril, hydrochlorothiazide, Norvasc  Monitor blood pressures    Diabetes mellitis    Januvia on hold  [facility]  HbA1c  Low carb diet    Dr. Godfrey or his coverage will see the patient tomorrow for H&P.    Electronically signed by Willie Godfrey DO on 7/16/2025 at 6:55 PM

## 2025-07-16 NOTE — PROGRESS NOTES
Notified Dr. Marshall that patient is working hard to breathe and breath sounds are wheezy. Patient denies SOB. See new order.

## 2025-07-16 NOTE — ANESTHESIA POSTPROCEDURE EVALUATION
Department of Anesthesiology  Postprocedure Note    Patient: Chery Hayden  MRN: [id number]  YOB: 1950  Date of evaluation: 7/16/2025    Procedure Summary       Date: 07/16/25 Room / Location: [address]    Anesthesia Start: 1223 Anesthesia Stop: 1429    Procedure: TOTAL THYROIDECTOMY (Neck) Diagnosis:       Multinodular goiter      (Multinodular goiter [E04.2])    Surgeons: Diya England DO Responsible Provider: Jenny Marshall DO    Anesthesia Type: General ASA Status: 3            Anesthesia Type: General    Nicolasa Phase I: Nicolasa Score: 10    Nicolasa Phase II:      Anesthesia Post Evaluation    Patient location during evaluation: PACU  Patient participation: complete - patient cannot participate  Level of consciousness: responsive to light touch  Pain score: 0  Airway patency: patent  Nausea & Vomiting: no nausea and no vomiting  Cardiovascular status: hemodynamically stable and blood pressure returned to baseline  Respiratory status: acceptable, face mask and spontaneous ventilation  Hydration status: euvolemic  Multimodal analgesia pain management approach  Pain management: adequate        No notable events documented.

## 2025-07-16 NOTE — H&P
Department of Otolaryngology  Office Consult Note  6/10/25              Subjective:         Chief Complaint:  had concerns including New Patient (Multinodular goiter).      Patient ID: Chery Hayden is a 75 y.o. female.     HPI:   History of Present Illness  The patient presents for evaluation of thyroid nodules and acoustic neuroma. She is accompanied by her son and granddaughter.     During a hospital stay for pulmonary embolism, an incidental finding of thyroid nodules was discovered. She has since undergone an ultrasound and biopsy of the thyroid. She reports no family history of thyroid disorders or exposure to radiation. She does not report any palpable masses in her neck and overall feels well from a thyroid perspective. She also reports no voice changes, hoarseness, or dysphagia.     She was diagnosed with acoustic neuroma approximately 2 months ago. She was referred to  otology and awaiting evaluation.      She has a history of blood clots in the lungs dating back to . 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 She was initiated on Eliquis on 02/10/2025.     FAMILY HISTORY  She does not report any family history of thyroid issues.        Review of Systems   Constitutional: Negative.    HENT:  Positive for hearing loss. Negative for congestion, ear discharge, ear pain, rhinorrhea, sneezing and sore throat.    Respiratory: Negative.     Cardiovascular:  Negative for chest pain.   Musculoskeletal: Negative.    Skin: Negative.    Allergic/Immunologic: Negative.    Neurological: Negative.    Psychiatric/Behavioral: Negative.     All other systems reviewed and are negative.           Past Medical History        Past Medical History:   Diagnosis Date    Arthritis      Bleeding ulcer      Hypertension           Past Surgical History         Past Surgical History:   Procedure Laterality Date     SECTION        CHOLECYSTECTOMY, LAPAROSCOPIC   2016     ERCP   07/07/2016    HYSTERECTOMY (CERVIX STATUS UNKNOWN)        JOINT REPLACEMENT Left       left knee           Current Medication      Current Outpatient Medications:     oxyCODONE-acetaminophen (PERCOCET) 5-325 MG per tablet, TAKE ONE TABLET BY MOUTH EVERY 12 HOURS AS NEEDED FOR PAIN, Disp: , Rfl:     amLODIPine (NORVASC) 5 MG tablet, Take 1 tablet by mouth daily, Disp: , Rfl:     allopurinol (ZYLOPRIM) 300 MG tablet, Take by mouth, Disp: , Rfl:     furosemide (LASIX) 40 MG tablet, Take 1 tablet by mouth daily Indications: taking as needed, Disp: , Rfl:     potassium chloride (KLOR-CON) 20 MEQ packet, Take 20 mEq by mouth daily Indications: taking as needed, Disp: , Rfl:     lisinopril-hydroCHLOROthiazide (PRINZIDE;ZESTORETIC) 20-25 MG per tablet, Take 1 tablet by mouth daily, Disp: , Rfl:     vitamin B-12 (CYANOCOBALAMIN) 100 MCG tablet, Take 0.5 tablets by mouth daily, Disp: , Rfl:     apixaban (ELIQUIS) 5 MG TABS tablet, Take 2 tablets by mouth 2 times daily for 5 days, THEN 1 tablet 2 times daily., Disp: 60 tablet, Rfl: 3     Patient has no known allergies.  Social History   Social History           Tobacco Use    Smoking status: Never    Smokeless tobacco: Never   Vaping Use    Vaping status: Never Used   Substance Use Topics    Alcohol use: No    Drug use: No         Family History   No family history on file.              Objective:   BP (!) 164/70   Pulse 69   Temp 98.8 °F (37.1 °C)   Ht 1.524 m (5')   Wt 99.3 kg (219 lb)   SpO2 95%   BMI 42.77 kg/m²      Physical Exam  Vitals reviewed.   Constitutional:       General: She is not in acute distress.     Appearance: She is well-developed.   HENT:      Head: Normocephalic and atraumatic.      Right Ear: Tympanic membrane, ear canal and external ear normal.      Left Ear: Tympanic membrane, ear canal and external ear normal.      Nose: Nose normal. No nasal deformity, septal deviation or rhinorrhea.      Mouth/Throat:      Mouth: Mucous membranes are

## 2025-07-16 NOTE — H&P
Chery Hayden was seen and re-examined preoperatively today, July 16, 2025.  There was no substantial change in her physical and medical status.  Patient is fit for the proposed surgical procedure.  All questions were appropriately addressed and had no further questions regarding the risks, benefits, and alternatives of the procedure.  Chery Hayden and family wished to proceed.    Macarena Cabrera DO  Resident Physician  [facility]  Otolaryngology Residency  7/16/2025  10:15 AM

## 2025-07-16 NOTE — OP NOTE
Operative Note      Patient: Chery Hayden  YOB: 1950  MRN: [id number]    Date of Procedure: 7/16/2025    Pre-Op Diagnosis Codes:      * Multinodular goiter [E04.2]    Post-Op Diagnosis: Same       Procedure(s):  TOTAL THYROIDECTOMY WITH NERVE MONITORING     Surgeon(s):  Diya England DO    Assistant:   Resident: Macarena Cabrera DO; Lauren Harden DO; Brendon Plasencia DO    Anesthesia: General    Estimated Blood Loss (mL): 20cc    Complications: None    Specimens:   ID Type Source Tests Collected by Time Destination   A : Thyroid, Stitch Left Anterior Superior Tissue Neck SURGICAL PATHOLOGY Diya England DO 7/16/2025 1336        Implants:  * No implants in log *      Drains: * No LDAs found *    Findings:  Present At Time Of Surgery (PATOS) (choose all levels that have infection present):  No infection present  Other Findings: large left thyroid nodule, enlarged right thyroid lobe , b/l RLN identified and stimulated, right inferior parathyroid intact    HISTORY: Chery Hayden is a 75 y.o. female with suspicious on afirma 4.3cm left thyroid nodule and right sided thyroid nodules. She presents today for total thyroidectomy. The risks and benefits of this procedure were discussed with the patient. The risks including, but not limited to, pain; bleeding; infection; scarring; damage to surrounding structures; recurrence; and the need for further procedures, were explained. The patient acknowledged and understood the risks, and agreed to continue with the procedure.     PROCEDURE: The patient was brought in the operating room suite. The patient was placed in the supine position.SCD's were in place, and it was confirmed that she received preoperative antibiotics. After establishment of general anesthesia via orotracheal intubation with a nerve integrity monitoring system endotracheal tube, Nerve integrity monitoring system endotracheal tube was confirmed to be working adequately and secured. The

## 2025-07-16 NOTE — PROGRESS NOTES
4 Eyes Skin Assessment     NAME:  Chery Hayden  YOB: 1950  MEDICAL RECORD NUMBER:  [id number]    The patient is being assessed for  Admission    I agree that at least one RN has performed a thorough Head to Toe Skin Assessment on the patient. ALL assessment sites listed below have been assessed.      Areas assessed by both nurses:    Head, Face, Ears, Shoulders, Back, Chest, Arms, Elbows, Hands, Sacrum. Buttock, Coccyx, Ischium, Legs. Feet and Heels, and Under Medical Devices         Does the Patient have a Wound? Yes wound(s) were present on assessment. LDA wound assessment was Initiated and completed by RN       Moose Prevention initiated by RN: Yes  Wound Care Orders initiated by RN: Yes    For hospital-acquired stage 1 & 2 and ALL Stage 3,4, Unstageable, DTI, NWPT, and Complex wounds: place order “IP Wound Care/Ostomy Nurse Eval and Treat” by RN under : Yes    New Ostomies, if present place, Ostomy referral order under : No     Nurse 1 eSignature: Electronically signed by Zahira Mukherjee RN on 7/16/25 at 4:52 PM EDT    **SHARE this note so that the co-signing nurse can place an eSignature**    Nurse 2 eSignature: Electronically signed by Nasir Taveras RN on 7/16/25 at 4:53 PM EDT

## 2025-07-16 NOTE — DISCHARGE INSTRUCTIONS
ENT Post-Op Instructions    Keep scheduled follow up with Dr. England 7/23/2025 10:30 AM. Call to confirm     Please follow the instructions below:    DIET INSTRUCTIONS:  Regular diet. Start with light meals today and increase as tolerated.    ACTIVITY INSTRUCTIONS:  Increase activity as tolerated    Elevate Head of bed   No heavy lifting or strenuous activity until your cleared at your post-operative appointment   No driving while taking pain medication    WOUND/DRESSING INSTRUCTIONS:  May shower normally in 24 hours from time of surgery. May shower from the neck down tonight.      Ice to areas of pain.     Sutures/Staples to be removed in the office.  You have sutures that dissolve and do not need to be removed.  You have steri-trip bandages (white bandages) over your incision. Leave these in place. Do not remove them. They will fall off in about 1 week. They will curl and peel at the edges, this is normal. They are OK to get wet, but do not soak. Blot dry, do not scrub.   Once the steri strips have fallen off or been removed in the office, place antibiotic ointment on the incision twice a day for 1-2 weeks.   OK to use scar cream after 2 weeks. Use sunscreen when going out in the sun for the first 6 months. Be careful to not extend your head backwards for a few weeks, this puts tension on the incision line and can cause more scarring.      MEDICATION INSTRUCTIONS:  Take medication as prescribed.  When taking pain medications, you may experience dizziness or drowsiness.  Do not drink alcohol or drive when taking these medications.  You may take Ibuprofen (over the counter) as per directions for mild pain.  Do not take any other acetaminophen (Tylenol) products while taking your pain medication.  You may experience constipation while taking narcotic pain medication - You may take over the counter stool softeners: docuscate (Colace) or sennosides S (Senokot - S).   Take Calcium (TUMS) 1,500mg if you experience

## 2025-07-16 NOTE — ANESTHESIA PRE PROCEDURE
Department of Anesthesiology  Preprocedure Note       Name:  Chery Hayden   Age:  75 y.o.  :  1950                                          MRN:  [id number]         Date:  2025      Surgeon: Surgeon(s):  Diya England DO    Procedure: Procedure(s):  TOTAL THYROIDECTOMY ++NERVE INTEGRITY MONITOR++    Medications prior to admission:   Prior to Admission medications    Medication Sig Start Date End Date Taking? Authorizing Provider   b complex vitamins capsule Take 1 capsule by mouth daily   Yes Gaviota Coulter MD   folic acid (FOLVITE) 1 MG tablet Take 1 tablet by mouth daily   Yes Gaviota Coulter MD   SITagliptin (JANUVIA) 100 MG tablet Take 1 tablet by mouth daily   Yes Gaviota Coulter MD   apixaban (ELIQUIS) 5 MG TABS tablet Take 2 tablets by mouth 2 times daily for 5 days, THEN 1 tablet 2 times daily. 2/10/25 7/11/25 Yes Toni Quiñones MD   amLODIPine (NORVASC) 5 MG tablet Take 1 tablet by mouth daily   Yes Gaviota Coulter MD   allopurinol (ZYLOPRIM) 300 MG tablet Take 1 tablet by mouth daily 21  Yes Gaviota Coulter MD   furosemide (LASIX) 40 MG tablet Take 1 tablet by mouth daily Indications: taking as needed  Patient taking differently: Take 1 tablet by mouth daily Indications: taking as needed Taking it PRN   Yes Gaviota Coulter MD   potassium chloride (KLOR-CON) 20 MEQ packet Take 20 mEq by mouth daily Indications: taking as needed Pt reports taking with Lasix as needed   Yes Gaviota Coulter MD   lisinopril-hydroCHLOROthiazide (PRINZIDE;ZESTORETIC) 20-25 MG per tablet Take 1 tablet by mouth daily   Yes Gaviota Coulter MD   oxyCODONE-acetaminophen (PERCOCET) 5-325 MG per tablet TAKE ONE TABLET BY MOUTH EVERY 12 HOURS AS NEEDED FOR PAIN 2/10/25   Gaviota Coulter MD       Current medications:    Current Facility-Administered Medications   Medication Dose Route Frequency Provider Last Rate Last Admin    sodium chloride flush 0.9 %

## 2025-07-17 VITALS
TEMPERATURE: 97.8 F | BODY MASS INDEX: 46.13 KG/M2 | SYSTOLIC BLOOD PRESSURE: 134 MMHG | WEIGHT: 235 LBS | HEIGHT: 60 IN | HEART RATE: 72 BPM | RESPIRATION RATE: 18 BRPM | OXYGEN SATURATION: 94 % | DIASTOLIC BLOOD PRESSURE: 65 MMHG

## 2025-07-17 LAB
ANION GAP SERPL CALCULATED.3IONS-SCNC: 14 MMOL/L (ref 7–16)
BASOPHILS # BLD: 0.01 K/UL (ref 0–0.2)
BASOPHILS NFR BLD: 0 % (ref 0–2)
BUN SERPL-MCNC: 26 MG/DL (ref 8–23)
CA-I BLD-SCNC: 1.17 MMOL/L (ref 1.15–1.33)
CA-I BLD-SCNC: 1.21 MMOL/L (ref 1.15–1.33)
CALCIUM SERPL-MCNC: 8.6 MG/DL (ref 8.8–10.2)
CALCIUM SERPL-MCNC: 8.9 MG/DL (ref 8.8–10.2)
CALCIUM SERPL-MCNC: 9 MG/DL (ref 8.8–10.2)
CHLORIDE SERPL-SCNC: 104 MMOL/L (ref 98–107)
CO2 SERPL-SCNC: 22 MMOL/L (ref 22–29)
CREAT SERPL-MCNC: 0.9 MG/DL (ref 0.5–1)
EOSINOPHIL # BLD: 0 K/UL (ref 0.05–0.5)
EOSINOPHILS RELATIVE PERCENT: 0 % (ref 0–6)
ERYTHROCYTE [DISTWIDTH] IN BLOOD BY AUTOMATED COUNT: 17.1 % (ref 11.5–15)
GFR, ESTIMATED: 67 ML/MIN/1.73M2
GLUCOSE BLD-MCNC: 133 MG/DL (ref 74–99)
GLUCOSE BLD-MCNC: 154 MG/DL (ref 74–99)
GLUCOSE BLD-MCNC: 194 MG/DL (ref 74–99)
GLUCOSE SERPL-MCNC: 220 MG/DL (ref 74–99)
HBA1C MFR BLD: 6.6 % (ref 4–5.6)
HCT VFR BLD AUTO: 39.7 % (ref 34–48)
HGB BLD-MCNC: 11.9 G/DL (ref 11.5–15.5)
IMM GRANULOCYTES # BLD AUTO: 0.11 K/UL (ref 0–0.58)
IMM GRANULOCYTES NFR BLD: 1 % (ref 0–5)
LYMPHOCYTES NFR BLD: 0.33 K/UL (ref 1.5–4)
LYMPHOCYTES RELATIVE PERCENT: 2 % (ref 20–42)
MCH RBC QN AUTO: 26.7 PG (ref 26–35)
MCHC RBC AUTO-ENTMCNC: 30 G/DL (ref 32–34.5)
MCV RBC AUTO: 89.2 FL (ref 80–99.9)
MONOCYTES NFR BLD: 0.39 K/UL (ref 0.1–0.95)
MONOCYTES NFR BLD: 3 % (ref 2–12)
NEUTROPHILS NFR BLD: 94 % (ref 43–80)
NEUTS SEG NFR BLD: 13.04 K/UL (ref 1.8–7.3)
PLATELET # BLD AUTO: 214 K/UL (ref 130–450)
PMV BLD AUTO: 11 FL (ref 7–12)
POTASSIUM SERPL-SCNC: 4.7 MMOL/L (ref 3.5–5.1)
PTH-INTACT SERPL-MCNC: 3 PG/ML (ref 15–65)
RBC # BLD AUTO: 4.45 M/UL (ref 3.5–5.5)
RBC # BLD: ABNORMAL 10*6/UL
SODIUM SERPL-SCNC: 140 MMOL/L (ref 136–145)
WBC OTHER # BLD: 13.9 K/UL (ref 4.5–11.5)

## 2025-07-17 PROCEDURE — 83036 HEMOGLOBIN GLYCOSYLATED A1C: CPT

## 2025-07-17 PROCEDURE — 80048 BASIC METABOLIC PNL TOTAL CA: CPT

## 2025-07-17 PROCEDURE — 36415 COLL VENOUS BLD VENIPUNCTURE: CPT

## 2025-07-17 PROCEDURE — 2580000003 HC RX 258

## 2025-07-17 PROCEDURE — G0378 HOSPITAL OBSERVATION PER HR: HCPCS

## 2025-07-17 PROCEDURE — 6370000000 HC RX 637 (ALT 250 FOR IP): Performed by: OTOLARYNGOLOGY

## 2025-07-17 PROCEDURE — 6370000000 HC RX 637 (ALT 250 FOR IP): Performed by: HOSPITALIST

## 2025-07-17 PROCEDURE — 85025 COMPLETE CBC W/AUTO DIFF WBC: CPT

## 2025-07-17 PROCEDURE — 6370000000 HC RX 637 (ALT 250 FOR IP)

## 2025-07-17 PROCEDURE — 82310 ASSAY OF CALCIUM: CPT

## 2025-07-17 PROCEDURE — 6370000000 HC RX 637 (ALT 250 FOR IP): Performed by: INTERNAL MEDICINE

## 2025-07-17 PROCEDURE — 2500000003 HC RX 250 WO HCPCS

## 2025-07-17 PROCEDURE — 82962 GLUCOSE BLOOD TEST: CPT

## 2025-07-17 PROCEDURE — 83970 ASSAY OF PARATHORMONE: CPT

## 2025-07-17 PROCEDURE — 82330 ASSAY OF CALCIUM: CPT

## 2025-07-17 RX ORDER — ALOGLIPTIN 6.25 MG/1
6.25 TABLET, FILM COATED ORAL DAILY
Status: DISCONTINUED | OUTPATIENT
Start: 2025-07-17 | End: 2025-07-17 | Stop reason: HOSPADM

## 2025-07-17 RX ORDER — CALCIUM CARBONATE 500(1250)
4000 TABLET ORAL DAILY
Status: DISCONTINUED | OUTPATIENT
Start: 2025-07-17 | End: 2025-07-17

## 2025-07-17 RX ORDER — LEVOTHYROXINE SODIUM 125 UG/1
125 TABLET ORAL DAILY
Qty: 30 TABLET | Refills: 1 | Status: SHIPPED | OUTPATIENT
Start: 2025-07-17 | End: 2025-09-15

## 2025-07-17 RX ORDER — CALCIUM CARBONATE 500(1250)
4000 TABLET ORAL DAILY
Status: DISCONTINUED | OUTPATIENT
Start: 2025-07-17 | End: 2025-07-17 | Stop reason: HOSPADM

## 2025-07-17 RX ORDER — CALCITRIOL 0.25 UG/1
0.5 CAPSULE, LIQUID FILLED ORAL DAILY
Qty: 60 CAPSULE | Refills: 3 | Status: SHIPPED | OUTPATIENT
Start: 2025-07-17

## 2025-07-17 RX ADMIN — INSULIN LISPRO 1 UNITS: 100 INJECTION, SOLUTION INTRAVENOUS; SUBCUTANEOUS at 08:22

## 2025-07-17 RX ADMIN — SODIUM CHLORIDE, SODIUM LACTATE, POTASSIUM CHLORIDE, AND CALCIUM CHLORIDE: .6; .31; .03; .02 INJECTION, SOLUTION INTRAVENOUS at 01:02

## 2025-07-17 RX ADMIN — HYDROCHLOROTHIAZIDE 25 MG: 25 TABLET ORAL at 08:21

## 2025-07-17 RX ADMIN — ALLOPURINOL 300 MG: 300 TABLET ORAL at 08:22

## 2025-07-17 RX ADMIN — AMLODIPINE BESYLATE 5 MG: 5 TABLET ORAL at 08:22

## 2025-07-17 RX ADMIN — ACETAMINOPHEN 650 MG: 325 TABLET ORAL at 14:24

## 2025-07-17 RX ADMIN — MICONAZOLE NITRATE: 20 POWDER TOPICAL at 08:22

## 2025-07-17 RX ADMIN — LISINOPRIL 20 MG: 20 TABLET ORAL at 08:22

## 2025-07-17 RX ADMIN — CALCIUM 4000 MG: 500 TABLET ORAL at 08:21

## 2025-07-17 RX ADMIN — LEVOTHYROXINE SODIUM 125 MCG: 0.1 TABLET ORAL at 06:55

## 2025-07-17 RX ADMIN — FOLIC ACID 1 MG: 1 TABLET ORAL at 08:22

## 2025-07-17 RX ADMIN — OXYCODONE 5 MG: 5 TABLET ORAL at 01:07

## 2025-07-17 RX ADMIN — SODIUM CHLORIDE, PRESERVATIVE FREE 10 ML: 5 INJECTION INTRAVENOUS at 08:23

## 2025-07-17 RX ADMIN — ACETAMINOPHEN 650 MG: 325 TABLET ORAL at 06:54

## 2025-07-17 RX ADMIN — Medication 3 MG: at 01:07

## 2025-07-17 RX ADMIN — ALOGLIPTIN 6.25 MG: 6.25 TABLET, FILM COATED ORAL at 16:37

## 2025-07-17 ASSESSMENT — PAIN - FUNCTIONAL ASSESSMENT
PAIN_FUNCTIONAL_ASSESSMENT: PREVENTS OR INTERFERES SOME ACTIVE ACTIVITIES AND ADLS
PAIN_FUNCTIONAL_ASSESSMENT: PREVENTS OR INTERFERES SOME ACTIVE ACTIVITIES AND ADLS
PAIN_FUNCTIONAL_ASSESSMENT: ACTIVITIES ARE NOT PREVENTED

## 2025-07-17 ASSESSMENT — PAIN DESCRIPTION - LOCATION
LOCATION: THROAT
LOCATION: NECK
LOCATION: NECK

## 2025-07-17 ASSESSMENT — PAIN DESCRIPTION - ORIENTATION
ORIENTATION: MID
ORIENTATION: RIGHT;LEFT
ORIENTATION: RIGHT;LEFT

## 2025-07-17 ASSESSMENT — PAIN SCALES - GENERAL
PAINLEVEL_OUTOF10: 8
PAINLEVEL_OUTOF10: 0
PAINLEVEL_OUTOF10: 6

## 2025-07-17 ASSESSMENT — PAIN DESCRIPTION - DESCRIPTORS
DESCRIPTORS: ACHING;DISCOMFORT;SORE
DESCRIPTORS: DISCOMFORT;ACHING;SORE
DESCRIPTORS: ACHING;SORE;NAGGING

## 2025-07-17 ASSESSMENT — PAIN SCALES - WONG BAKER: WONGBAKER_NUMERICALRESPONSE: NO HURT

## 2025-07-17 NOTE — PROGRESS NOTES
CLINICAL PHARMACY NOTE: MEDS TO BEDS    Total # of Prescriptions Filled: 1   The following medications were delivered to the patient:  Cephalexin 500mg    Additional Documentation:

## 2025-07-17 NOTE — PLAN OF CARE
Problem: Discharge Planning  Goal: Discharge to home or other facility with appropriate resources  Outcome: Progressing  Flowsheets (Taken 7/16/2025 1701 by Zahira Mukherjee, RN)  Discharge to home or other facility with appropriate resources:   Identify barriers to discharge with patient and caregiver   Arrange for needed discharge resources and transportation as appropriate   Identify discharge learning needs (meds, wound care, etc)   Arrange for interpreters to assist at discharge as needed   Refer to discharge planning if patient needs post-hospital services based on physician order or complex needs related to functional status, cognitive ability or social support system     Problem: Safety - Adult  Goal: Free from fall injury  Outcome: Progressing     Problem: Pain  Goal: Verbalizes/displays adequate comfort level or baseline comfort level  Outcome: Progressing  Flowsheets (Taken 7/16/2025 1700 by Zahira Mukherjee, RN)  Verbalizes/displays adequate comfort level or baseline comfort level:   Encourage patient to monitor pain and request assistance   Administer analgesics based on type and severity of pain and evaluate response   Assess pain using appropriate pain scale   Implement non-pharmacological measures as appropriate and evaluate response   Consider cultural and social influences on pain and pain management   Notify Licensed Independent Practitioner if interventions unsuccessful or patient reports new pain     Problem: Chronic Conditions and Co-morbidities  Goal: Patient's chronic conditions and co-morbidity symptoms are monitored and maintained or improved  Outcome: Progressing  Flowsheets (Taken 7/16/2025 1701 by Zahira Mukherjee, RN)  Care Plan - Patient's Chronic Conditions and Co-Morbidity Symptoms are Monitored and Maintained or Improved:   Monitor and assess patient's chronic conditions and comorbid symptoms for stability, deterioration, or improvement   Collaborate with multidisciplinary

## 2025-07-17 NOTE — CONSULTS
Internal Medicine Consult Note    Name: Chery Hayden  : 1950  Chief Complaint: No chief complaint on file.  Primary Care Physician: Stna Leija DO  Admission date: 2025  Date of service: 2025     History of Present Illness  Chery is a 75 y.o. year old female.  We reconsult medical management on a 75-year-old woman for thyroidectomy with ENT.  Patient herself is feeling well today.  Denies any fever, chills, headache, vision or hearing changes, dysuria, hematuria, constipation, diarrhea.  Patient was noted to have some elevated blood sugars overnight.  States she usually takes Januvia at home but did not receive it here.  She was getting sliding scale here and tolerating.  Patient's hemoglobin A1c was 6.6 on check.  Patient states she has no other concerns of chest pain or shortness of breath at this time.  Denies any dizziness or trouble moving.  States she has been up with her walker today.  Patient is eating and drinking.  Having some sore throat with eating.  States she wants to be discharged home today.  At this point, patient feels well.    Nothing has been making her feel better or worse.  States her wound on her neck is healing.      ED course:   Initial blood work and imaging studies performed. Admission recommended by ED physician. Case was discussed with ED provider. Meds in ED consisted of the following:  Medications   sodium chloride flush 0.9 % injection 5-40 mL (10 mLs IntraVENous Given 25 0823)   sodium chloride flush 0.9 % injection 5-40 mL (has no administration in time range)   0.9 % sodium chloride infusion (has no administration in time range)   polyethylene glycol (GLYCOLAX) packet 17 g (has no administration in time range)   ondansetron (ZOFRAN-ODT) disintegrating tablet 4 mg (has no administration in time range)     Or   ondansetron (ZOFRAN) injection 4 mg (has no administration in time range)   oxyCODONE (ROXICODONE) immediate release tablet 5 mg (5 mg Oral  Given 7/17/25 0107)   potassium chloride (KLOR-CON M) extended release tablet 20 mEq (20 mEq Oral Not Given 7/17/25 1027)   allopurinol (ZYLOPRIM) tablet 300 mg (300 mg Oral Given 7/17/25 0822)   amLODIPine (NORVASC) tablet 5 mg (5 mg Oral Given 7/17/25 0822)   folic acid (FOLVITE) tablet 1 mg (1 mg Oral Given 7/17/25 0822)   insulin lispro (HUMALOG,ADMELOG) injection vial 0-4 Units ( SubCUTAneous Not Given 7/17/25 1137)   glucose chewable tablet 16 g (has no administration in time range)   dextrose bolus 10% 125 mL (has no administration in time range)     Or   dextrose bolus 10% 250 mL (has no administration in time range)   glucagon injection 1 mg (has no administration in time range)   dextrose 10 % infusion (has no administration in time range)   lisinopril (PRINIVIL;ZESTRIL) tablet 20 mg (20 mg Oral Given 7/17/25 0822)     And   hydroCHLOROthiazide (HYDRODIURIL) tablet 25 mg (25 mg Oral Given 7/17/25 0821)   levothyroxine (SYNTHROID) tablet 125 mcg (125 mcg Oral Given 7/17/25 0655)   miconazole (MICOTIN) 2 % powder ( Topical Given 7/17/25 0822)   white petrolatum ointment (has no administration in time range)   acetaminophen (TYLENOL) tablet 650 mg (650 mg Oral Given 7/17/25 0654)   melatonin tablet 3 mg (3 mg Oral Given 7/17/25 0107)   calcium elemental (OSCAL) tablet 4,000 mg (4,000 mg Oral Given 7/17/25 0821)   ceFAZolin (ANCEF) 2,000 mg in sterile water 20 mL IV syringe (2,000 mg IntraVENous New Bag 7/16/25 1242)   ipratropium 0.5 mg-albuterol 2.5 mg (DUONEB) nebulizer solution 1 Dose (1 Dose Inhalation Given 7/16/25 1119)       Past Medical History:   Diagnosis Date    Anticoagulated     on Eliquis d/t P.E.    Arthritis     Bleeding ulcer 2015    Diabetes mellitus (HCC)     History of blood transfusion 2015    Eek (hard of hearing)     wear  karishma hearing aids    Hx of blood clots     Hypertension     Multinodular goiter     MERCEDES on CPAP     getting CPAP ordered currently    Pulmonary embolism (HCC) 02/2025

## 2025-07-17 NOTE — PLAN OF CARE
Problem: Discharge Planning  Goal: Discharge to home or other facility with appropriate resources  7/17/2025 1328 by Keya Martinez RN  Outcome: Progressing  7/17/2025 0319 by Jessiac Stinson RN  Outcome: Progressing  Flowsheets (Taken 7/16/2025 1701 by Zahira Mukherjee, RN)  Discharge to home or other facility with appropriate resources:   Identify barriers to discharge with patient and caregiver   Arrange for needed discharge resources and transportation as appropriate   Identify discharge learning needs (meds, wound care, etc)   Arrange for interpreters to assist at discharge as needed   Refer to discharge planning if patient needs post-hospital services based on physician order or complex needs related to functional status, cognitive ability or social support system     Problem: Safety - Adult  Goal: Free from fall injury  7/17/2025 1328 by Keya Martinez RN  Outcome: Progressing  7/17/2025 0319 by Jessica Stinson RN  Outcome: Progressing     Problem: Pain  Goal: Verbalizes/displays adequate comfort level or baseline comfort level  7/17/2025 1328 by Keya Martinez RN  Outcome: Progressing  7/17/2025 0319 by Jessica Stinson RN  Outcome: Progressing  Flowsheets (Taken 7/16/2025 1700 by Zahira Mukherjee, RN)  Verbalizes/displays adequate comfort level or baseline comfort level:   Encourage patient to monitor pain and request assistance   Administer analgesics based on type and severity of pain and evaluate response   Assess pain using appropriate pain scale   Implement non-pharmacological measures as appropriate and evaluate response   Consider cultural and social influences on pain and pain management   Notify Licensed Independent Practitioner if interventions unsuccessful or patient reports new pain     Problem: Chronic Conditions and Co-morbidities  Goal: Patient's chronic conditions and co-morbidity symptoms are monitored and maintained or improved  7/17/2025 1328 by Keya Martinez

## 2025-07-17 NOTE — PROGRESS NOTES
OTOLARYNGOLOGY  DAILY PROGRESS NOTE  2025    Subjective:     Patient is doing well today. Tolerating  diet. Afebrile over night. No issues overnight. Total calcium 8.4, Ionized 1.11 although remains asymptomatic for signs of hypocalcemia.    Objective:     BP (!) 152/72   Pulse 77   Temp 97.5 °F (36.4 °C) (Oral)   Resp 18   Ht 1.524 m (5')   Wt 106.6 kg (235 lb)   SpO2 90%   BMI 45.90 kg/m²   PULSE OXIMETRY RANGE: SpO2  Av.8 %  Min: 90 %  Max: 98 %  I/O last 3 completed shifts:  In: .3 [I.V.:.3]  Out:  [Urine:]    GENERAL:  Laying in bed, awake, alert, cooperative, no apparent distress  HENT: Normocephalic, no nasal drainage, mucous membranes are pink and mouist   NEURO: CN II-XII intact, no hoarseness or change in voice   NECK: Incision is clean, dry, and intact serosanguinous ouput  EYES: No sclera icterus, pupils equal  LUNGS:  No increased work of breathing, no respiratory distress or stridor   CARDIOVASCULAR: Normal rate     CBC  Recent Labs     25  1030   WBC 9.2   HGB 12.0   HCT 40.4        BMP  Recent Labs     25  1030 25  1840 25  2220     --   --    K 4.8  --   --    *  --   --    CO2 21*  --   --    BUN 32*  --   --    CREATININE 0.9  --   --    CALCIUM 8.9   < > 8.4*    < > = values in this interval not displayed.     PT-INR  No results for input(s): \"INR\" in the last 72 hours.    Invalid input(s): \"PT\", \"PTT\"  CALCIUM  Recent Labs     25  1030 25  1840 25  2220   CALCIUM 8.9 8.7* 8.4*       Assessment/Plan:     75 y.o. female POD #1 s/p total thyroidectomy      - pathology pending, further recs to follow in clinic  Post op hypocalcemia, calcium increased to 4g daily.   Need morning labs, repeat PTH ordered.  General diet as tolerated  Pain and nausea control PRN  Ambulate and out of bed   SCDs     Likely home this afternoon vs tomorrow.     Will discuss with attending    Electronically signed by DO monika Nguyen  7/17/2025 at 8:22 AM

## 2025-07-17 NOTE — PROGRESS NOTES
Spiritual Health History and Assessment/Progress Note  [facility]    Initial Encounter, Attempted Encounter,  ,  ,      Name: Chery Hayden MRN: [id number]    Age: 75 y.o.     Sex: female   Language: English   Bahai: Baptism   S/P total thyroidectomy     Date: 7/17/2025                           Spiritual Assessment began in [facility] 6[facility] MED SURG        Referral/Consult From: Rounding   Encounter Overview/Reason: Initial Encounter, Attempted Encounter  Service Provided For: Patient, Patient not available    Basia, Belief, Meaning:   Patient is connected with a basia tradition or spiritual practice and has beliefs or practices that help with coping during difficult times  Family/Friends No family/friends present      Importance and Influence:  Patient unable to assess at this time  Family/Friends No family/friends present    Community:  Patient is connected with a spiritual community and feels well-supported. Support system includes: Children and Extended family  Family/Friends No family/friends present    Assessment and Plan of Care:     Patient Interventions include: Other: Nursing caring for the patient, prayer silently offered for the patient  Family/Friends Interventions include: No family/friends present    Patient Plan of Care: Spiritual Care available upon further referral  Family/Friends Plan of Care: No family/friends present    Electronically signed by Chaplain Mitchel on 7/17/2025 at 3:13 PM

## 2025-07-23 ENCOUNTER — OFFICE VISIT (OUTPATIENT)
Dept: ENT CLINIC | Age: 75
End: 2025-07-23

## 2025-07-23 VITALS
SYSTOLIC BLOOD PRESSURE: 154 MMHG | BODY MASS INDEX: 45.16 KG/M2 | OXYGEN SATURATION: 98 % | HEIGHT: 60 IN | DIASTOLIC BLOOD PRESSURE: 79 MMHG | TEMPERATURE: 98 F | HEART RATE: 65 BPM | WEIGHT: 230 LBS

## 2025-07-23 DIAGNOSIS — Z90.89 S/P TOTAL THYROIDECTOMY: ICD-10-CM

## 2025-07-23 DIAGNOSIS — Z98.890 S/P TOTAL THYROIDECTOMY: Primary | ICD-10-CM

## 2025-07-23 DIAGNOSIS — R94.6 ABNORMAL RESULTS OF THYROID FUNCTION STUDIES: ICD-10-CM

## 2025-07-23 DIAGNOSIS — R53.1 GENERALIZED WEAKNESS: ICD-10-CM

## 2025-07-23 DIAGNOSIS — Z90.89 S/P TOTAL THYROIDECTOMY: Primary | ICD-10-CM

## 2025-07-23 DIAGNOSIS — Z98.890 S/P TOTAL THYROIDECTOMY: ICD-10-CM

## 2025-07-23 DIAGNOSIS — E07.9 THYROID DISORDER: ICD-10-CM

## 2025-07-23 LAB
CALCIUM IONIZED: 1.19 MMOL/L (ref 1.15–1.33)
CALCIUM SERPL-MCNC: 9.4 MG/DL (ref 8.8–10.2)
PTH INTACT: 2 PG/ML (ref 15–65)

## 2025-07-23 PROCEDURE — 99024 POSTOP FOLLOW-UP VISIT: CPT | Performed by: OTOLARYNGOLOGY

## 2025-07-23 ASSESSMENT — ENCOUNTER SYMPTOMS
COLOR CHANGE: 0
SORE THROAT: 0
VOICE CHANGE: 0
TROUBLE SWALLOWING: 0
SINUS PRESSURE: 0
SINUS PAIN: 0
STRIDOR: 0
COUGH: 0
GASTROINTESTINAL NEGATIVE: 1
RHINORRHEA: 0
WHEEZING: 0

## 2025-07-23 ASSESSMENT — VISUAL ACUITY: OU: 1

## 2025-07-23 NOTE — PROGRESS NOTES
Subjective:     Patient ID:  Chery Hayden is a 75 y.o. female.    HPI:    Pt returns today for followup, s/p total thyroidectomy and review of path report.  Pt is  doing well. Notes some weakness and new shaking of the right hand. There are not problems from the surgical site.    Voiceis clear  PTH was less than 6 post op.   Has been taking the tapered calcium regimen and calcitriol.  Denies numbness or tingling of extremities.     Patient's medications,allergies, past medical, surgical, social and family histories were reviewed andupdated as appropriate.         Review of Systems   Constitutional:  Negative for activity change, fatigue and fever.   HENT:  Negative for congestion, ear discharge, ear pain, hearing loss, nosebleeds, postnasal drip, rhinorrhea, sinus pressure, sinus pain, sore throat, tinnitus, trouble swallowing and voice change.    Respiratory:  Negative for cough, choking, wheezing and stridor.    Cardiovascular:  Negative for chest pain.   Gastrointestinal: Negative.    Genitourinary: Negative.    Skin:  Negative for color change and rash.   Neurological:  Negative for speech difficulty, light-headedness, numbness and headaches.   Hematological:  Negative for adenopathy.   Psychiatric/Behavioral:  Negative for behavioral problems.                Objective:   Physical Exam  Constitutional:       Appearance: Normal appearance. She is normal weight.   HENT:      Head: Normocephalic and atraumatic.      Right Ear: Tympanic membrane, ear canal and external ear normal. No drainage.      Left Ear: Tympanic membrane, ear canal and external ear normal. No drainage.      Nose: Nose normal. No nasal deformity or septal deviation.      Mouth/Throat:      Mouth: Mucous membranes are moist.   Eyes:      General: Lids are normal. Vision grossly intact.      Extraocular Movements: Extraocular movements intact.      Conjunctiva/sclera: Conjunctivae normal.      Pupils: Pupils are equal, round, and reactive to

## 2025-07-24 ENCOUNTER — TELEPHONE (OUTPATIENT)
Dept: ENT CLINIC | Age: 75
End: 2025-07-24

## 2025-07-24 DIAGNOSIS — Z98.890 S/P TOTAL THYROIDECTOMY: Primary | ICD-10-CM

## 2025-07-24 DIAGNOSIS — Z90.89 S/P TOTAL THYROIDECTOMY: Primary | ICD-10-CM

## 2025-07-29 LAB — SURGICAL PATHOLOGY REPORT: NORMAL

## 2025-07-30 ENCOUNTER — TELEPHONE (OUTPATIENT)
Dept: ENT CLINIC | Age: 75
End: 2025-07-30

## 2025-08-08 ENCOUNTER — HOSPITAL ENCOUNTER (OUTPATIENT)
Dept: LAB | Age: 75
Discharge: HOME | End: 2025-08-08
Payer: MEDICARE

## 2025-08-08 ENCOUNTER — TELEPHONE (OUTPATIENT)
Dept: ENT CLINIC | Age: 75
End: 2025-08-08

## 2025-08-08 DIAGNOSIS — Z90.89 S/P TOTAL THYROIDECTOMY: ICD-10-CM

## 2025-08-08 DIAGNOSIS — Z98.890 S/P TOTAL THYROIDECTOMY: ICD-10-CM

## 2025-08-08 DIAGNOSIS — Z98.890 S/P TOTAL THYROIDECTOMY: Primary | ICD-10-CM

## 2025-08-08 DIAGNOSIS — Z90.89 S/P TOTAL THYROIDECTOMY: Primary | ICD-10-CM

## 2025-08-08 LAB
CA-I BLD-SCNC: 1.06 MMOL/L (ref 1.15–1.33)
PTH-INTACT SERPL-MCNC: 7 PG/ML (ref 15–65)

## 2025-08-08 PROCEDURE — 36415 COLL VENOUS BLD VENIPUNCTURE: CPT

## 2025-08-08 PROCEDURE — 83970 ASSAY OF PARATHORMONE: CPT

## 2025-08-08 PROCEDURE — 82330 ASSAY OF CALCIUM: CPT

## 2025-08-14 ENCOUNTER — TELEPHONE (OUTPATIENT)
Dept: ENT CLINIC | Age: 75
End: 2025-08-14

## (undated) DEVICE — Device

## (undated) DEVICE — KIT SURG W7XL11IN 2 PKT UNTREATED NA

## (undated) DEVICE — BLADE,STAINLESS-STEEL,15,STRL,DISPOSABLE: Brand: MEDLINE

## (undated) DEVICE — ELECTRODE PT RET AD L9FT HI MOIST COND ADH HYDRGEL CORDED

## (undated) DEVICE — SKN PREP SPNG STKS PVP PNT STR: Brand: MEDLINE INDUSTRIES, INC.

## (undated) DEVICE — GLOVE SURG SZ 6 THK91MIL LTX FREE SYN POLYISOPRENE ANTI

## (undated) DEVICE — DOUBLE BASIN SET: Brand: MEDLINE INDUSTRIES, INC.

## (undated) DEVICE — PROBE 8225101 5PK STD PRASS FL TIP ROHS

## (undated) DEVICE — BLADE ES ELASTOMERIC COAT INSUL DURABLE BEND UPTO 90DEG

## (undated) DEVICE — 4-PORT MANIFOLD: Brand: NEPTUNE 2

## (undated) DEVICE — AGENT HEMSTAT 3GM PURIFIED PLNT STARCH PWD ABSRB ARISTA AH

## (undated) DEVICE — MAGNETIC INSTR DRAPE 20X16: Brand: MEDLINE INDUSTRIES, INC.

## (undated) DEVICE — NEEDLE HYPO 25GA L1.5IN BLU POLYPR HUB S STL REG BVL STR

## (undated) DEVICE — SYRINGE MED 10ML TRNSLUC BRL PLUNG BLK MRK POLYPR CTRL

## (undated) DEVICE — TUBING SUCT 12FR MAL ALUM SHFT FN CAP VENT UNIV CONN W/ OBT

## (undated) DEVICE — CORD,CAUTERY,BIPOLAR,STERILE: Brand: MEDLINE

## (undated) DEVICE — SPONGE,PEANUT,XRAY,ST,SM,3/8",5/CARD: Brand: MEDLINE INDUSTRIES, INC.

## (undated) DEVICE — TOWEL,OR,DSP,ST,BLUE,STD,6/PK,12PK/CS: Brand: MEDLINE

## (undated) DEVICE — DISSECTOR ENDOSCP L21CM TIP CURVATURE 40DEG FN CRV JAW VES